# Patient Record
Sex: MALE | Race: WHITE | NOT HISPANIC OR LATINO | Employment: FULL TIME | URBAN - METROPOLITAN AREA
[De-identification: names, ages, dates, MRNs, and addresses within clinical notes are randomized per-mention and may not be internally consistent; named-entity substitution may affect disease eponyms.]

---

## 2021-01-16 ENCOUNTER — HOSPITAL ENCOUNTER (EMERGENCY)
Facility: HOSPITAL | Age: 26
Discharge: HOME | End: 2021-01-16
Attending: EMERGENCY MEDICINE
Payer: COMMERCIAL

## 2021-01-16 VITALS
TEMPERATURE: 97.7 F | DIASTOLIC BLOOD PRESSURE: 74 MMHG | SYSTOLIC BLOOD PRESSURE: 126 MMHG | OXYGEN SATURATION: 97 % | HEART RATE: 74 BPM | RESPIRATION RATE: 16 BRPM | WEIGHT: 194 LBS

## 2021-01-16 DIAGNOSIS — Z20.822 SUSPECTED COVID-19 VIRUS INFECTION: ICD-10-CM

## 2021-01-16 DIAGNOSIS — J06.9 URI (UPPER RESPIRATORY INFECTION): Primary | ICD-10-CM

## 2021-01-16 PROCEDURE — 99283 EMERGENCY DEPT VISIT LOW MDM: CPT

## 2021-01-16 PROCEDURE — U0003 INFECTIOUS AGENT DETECTION BY NUCLEIC ACID (DNA OR RNA); SEVERE ACUTE RESPIRATORY SYNDROME CORONAVIRUS 2 (SARS-COV-2) (CORONAVIRUS DISEASE [COVID-19]), AMPLIFIED PROBE TECHNIQUE, MAKING USE OF HIGH THROUGHPUT TECHNOLOGIES AS DESCRIBED BY CMS-2020-01-R: HCPCS | Performed by: PHYSICIAN ASSISTANT

## 2021-01-16 PROCEDURE — 99284 EMERGENCY DEPT VISIT MOD MDM: CPT | Performed by: PHYSICIAN ASSISTANT

## 2021-01-16 NOTE — Clinical Note
Sean Ragsdale was seen and treated in our emergency department on 1/16/2021  Diagnosis:     Kelsi Barahona    He may return on this date:     Patient tested for COVID-19  Pt will need to quarantine until results come back in approximately 3-5 days  If COVID positive, patient requires 14 day self-quarantine  If COVID negative and patient symptom free for 3 days, patient will be able to return to work  If you have any questions or concerns, please don't hesitate to call        Iza Lo PA-C    ______________________________           _______________          _______________  Hospital Representative                              Date                                Time

## 2021-01-16 NOTE — ED PROVIDER NOTES
HPI: Patient is a 26 y o  male who presents with 3 days of cough, headache and sore throat nasal congestion, post-nasal drip which the patient describes at mild The patient has had contact with people with similar symptoms  The patient has not taken any medication  Allergies   Allergen Reactions    Penicillins Hives       Past Medical History:   Diagnosis Date    Hypertension       History reviewed  No pertinent surgical history  Social History     Tobacco Use    Smoking status: Never Smoker    Smokeless tobacco: Never Used   Substance Use Topics    Alcohol use: Yes     Frequency: Monthly or less    Drug use: Never       Nursing notes reviewed  Physical Exam:  ED Triage Vitals [01/16/21 1836]   Temperature Pulse Respirations Blood Pressure SpO2   97 7 °F (36 5 °C) 74 16 126/74 97 %      Temp Source Heart Rate Source Patient Position - Orthostatic VS BP Location FiO2 (%)   Temporal Monitor Sitting Right arm --      Pain Score       5           ROS: Positive for cough, congestion, headache, post-nasal drip, headache, the remainder of a 10 organ system ROS was otherwise unremarkable  General: awake, alert, no acute distress    Head: normocephalic, atraumatic    Eyes: no scleral icterus  Ears: external ears normal, hearing grossly intact  Nose: external exam grossly normal, positive nasal discharge  Throat: no swelling, erythema or exudate  Uvula midline  Neck: symmetric, No JVD noted, trachea midline, no meningeal signs  Pulmonary: no respiratory distress, no tachypnea noted, lungs clear to auscultation bilaterally  Cardiovascular: appears well perfused, RRR  Abdomen: no distention noted  Musculoskeletal: no deformities noted, tone normal  Neuro: grossly non-focal  Psych: mood and affect appropriate    The patient is stable and has a history and physical exam consistent with a viral illness  COVID19 testing has been performed    I considered the patient's other medical conditions as applicable/noted above in my medical decision making  The patient is stable upon discharge  The plan is for supportive care at home  The patient (and any family present) verbalized understanding of the discharge instructions and warnings that would necessitate return to the Emergency Department  All questions were answered prior to discharge  Quick COVID-19 Severity Index (Predicts 24-hr risk of critical respiratory illness in patient's admitted from ED with COVID-19 )   Respiratory rate: <22 = 0  Pulse oximetry >92 = 0  O2 flow rate (L/min) <2 = 0  Result = 0 (low risk - 4% risk of critical illness at 24 hours)      Medications - No data to display  Final diagnoses:   Suspected COVID-19 virus infection   URI (upper respiratory infection)     Time reflects when diagnosis was documented in both MDM as applicable and the Disposition within this note     Time User Action Codes Description Comment    1/16/2021  7:23 PM Maria Tyrese Add [Z20 822] Suspected COVID-19 virus infection     1/16/2021  7:23 PM Maira Tyrese Add [J06 9] URI (upper respiratory infection)     1/16/2021  7:23 PM Maria Tyrese Modify [Z20 822] Suspected COVID-19 virus infection     1/16/2021  7:23 PM Maria Tyrese Modify [J06 9] URI (upper respiratory infection)       ED Disposition     ED Disposition Condition Date/Time Comment    Discharge Stable Sat Jan 16, 2021  7:23 PM Wilman Moore discharge to home/self care              Follow-up Information     Follow up With Specialties Details Why Contact Info Additional [address] [facility] Emergency Department Emergency Medicine Go to  If symptoms worsen [facility] [id number]  [address] [place] [address] Emergency Department, [address] [place] [address], [id number]        Patient's Medications   Discharge Prescriptions    DEXTROMETHORPHAN-GUAIFENESIN (MUCINEX DM)  MG PER 12 HR TABLET    Take 1 tablet by mouth every 12 (twelve) hours Start Date: 1/16/2021 End Date: --       Order Dose: 1 tablet       Quantity: 14 tablet    Refills: 0     No discharge procedures on file      Electronically Signed by       Joe Jovel PA-C  01/16/21 1924

## 2021-01-20 LAB — SARS-COV-2 RNA SPEC QL NAA+PROBE: NOT DETECTED

## 2021-01-20 NOTE — RESULT ENCOUNTER NOTE
Attempted calling Alisia Naqvi to provide testing results, no answer, left a message for him to return call or call the ER Dept he had testing completed at

## 2021-02-16 ENCOUNTER — TELEPHONE (OUTPATIENT)
Dept: FAMILY MEDICINE CLINIC | Facility: CLINIC | Age: 26
End: 2021-02-16

## 2021-02-16 ENCOUNTER — OFFICE VISIT (OUTPATIENT)
Dept: FAMILY MEDICINE CLINIC | Facility: CLINIC | Age: 26
End: 2021-02-16
Payer: COMMERCIAL

## 2021-02-16 VITALS
OXYGEN SATURATION: 98 % | HEART RATE: 89 BPM | WEIGHT: 196 LBS | HEIGHT: 68 IN | TEMPERATURE: 97.6 F | BODY MASS INDEX: 29.7 KG/M2 | RESPIRATION RATE: 16 BRPM | DIASTOLIC BLOOD PRESSURE: 84 MMHG | SYSTOLIC BLOOD PRESSURE: 130 MMHG

## 2021-02-16 DIAGNOSIS — J30.9 ALLERGIC RHINITIS, UNSPECIFIED SEASONALITY, UNSPECIFIED TRIGGER: Primary | ICD-10-CM

## 2021-02-16 DIAGNOSIS — K22.719 BARRETT'S ESOPHAGUS WITH DYSPLASIA: ICD-10-CM

## 2021-02-16 DIAGNOSIS — G89.29 CHRONIC LOW BACK PAIN, UNSPECIFIED BACK PAIN LATERALITY, UNSPECIFIED WHETHER SCIATICA PRESENT: ICD-10-CM

## 2021-02-16 DIAGNOSIS — K21.00 GASTROESOPHAGEAL REFLUX DISEASE WITH ESOPHAGITIS WITHOUT HEMORRHAGE: ICD-10-CM

## 2021-02-16 DIAGNOSIS — M54.50 CHRONIC LOW BACK PAIN, UNSPECIFIED BACK PAIN LATERALITY, UNSPECIFIED WHETHER SCIATICA PRESENT: ICD-10-CM

## 2021-02-16 DIAGNOSIS — F41.9 ANXIETY: ICD-10-CM

## 2021-02-16 DIAGNOSIS — R03.0 ELEVATED BLOOD PRESSURE READING IN OFFICE WITHOUT DIAGNOSIS OF HYPERTENSION: ICD-10-CM

## 2021-02-16 DIAGNOSIS — F41.9 ANXIETY: Primary | ICD-10-CM

## 2021-02-16 DIAGNOSIS — J30.9 ALLERGIC RHINITIS, UNSPECIFIED SEASONALITY, UNSPECIFIED TRIGGER: ICD-10-CM

## 2021-02-16 PROCEDURE — 3725F SCREEN DEPRESSION PERFORMED: CPT | Performed by: FAMILY MEDICINE

## 2021-02-16 PROCEDURE — 99203 OFFICE O/P NEW LOW 30 MIN: CPT | Performed by: FAMILY MEDICINE

## 2021-02-16 RX ORDER — HYDROXYZINE HYDROCHLORIDE 25 MG/1
25 TABLET, FILM COATED ORAL EVERY 6 HOURS PRN
COMMUNITY
End: 2021-02-16 | Stop reason: SDUPTHER

## 2021-02-16 RX ORDER — PANTOPRAZOLE SODIUM 40 MG/1
40 TABLET, DELAYED RELEASE ORAL DAILY
COMMUNITY
End: 2021-02-16 | Stop reason: SDUPTHER

## 2021-02-16 RX ORDER — PANTOPRAZOLE SODIUM 40 MG/1
TABLET, DELAYED RELEASE ORAL
Qty: 30 TABLET | Refills: 1 | Status: SHIPPED | OUTPATIENT
Start: 2021-02-16 | End: 2021-04-26

## 2021-02-16 RX ORDER — CETIRIZINE HYDROCHLORIDE 10 MG/1
10 TABLET ORAL DAILY
Start: 2021-02-16 | End: 2021-03-03

## 2021-02-16 RX ORDER — HYDROXYZINE HYDROCHLORIDE 25 MG/1
TABLET, FILM COATED ORAL
Qty: 30 TABLET | Refills: 0 | Status: SHIPPED | OUTPATIENT
Start: 2021-02-16 | End: 2021-03-23 | Stop reason: ALTCHOICE

## 2021-02-16 NOTE — PATIENT INSTRUCTIONS
Recommendation for transfer of records from prior PCP    Refer to gastroenterology    For blood pressure:   Advise limiting caffeine and salt intake  Exercise    Fasting labs  Then return visit in 6-8 weeks to review
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
## 2021-02-17 RX ORDER — ESCITALOPRAM OXALATE 10 MG/1
10 TABLET ORAL DAILY
Qty: 90 TABLET | Refills: 0
Start: 2021-02-17 | End: 2021-03-23 | Stop reason: ALTCHOICE

## 2021-02-17 RX ORDER — MONTELUKAST SODIUM 10 MG/1
10 TABLET ORAL
Qty: 90 TABLET | Refills: 0
Start: 2021-02-17

## 2021-02-17 NOTE — TELEPHONE ENCOUNTER
Please call pt in followup to visit yesterday -   Interestingly - pt already is taking lexapro; That was one of the medications that we discussed at visit  If pt is taking 10 mg lexapro daily - then I would suggest increasing to 20 mg daily  And try to decrease atarax dosing to once daily - with ultimate goal of taking atarax prn only  Since he has been taking atarax for 1-2 years multiple times/day - would at least try to decrease to qd dosing for now  And then recommend keeping scheduled followup appt in 3/2021  I can send in refill on lexapro for 20 mg tabs; He can take 2 of the 10 mg tabs for now  Thanks!

## 2021-02-26 ENCOUNTER — TELEPHONE (OUTPATIENT)
Dept: OTHER | Facility: OTHER | Age: 26
End: 2021-02-26

## 2021-02-26 NOTE — TELEPHONE ENCOUNTER
I spoke to him earlier this afternoon, we are moving office today -   unable to see him  Has had 4-5 days upper congestion -   Thinks he has a 'sinus infection '  Has some stomach upset, runny nose, headache and sore throat along w the congestion - No fever, no cough         I advised doing covid test w/ widespread outbreak in area  -   He does not feel this is covid,   I advised eval at [facility] where they can better assess what is needed

## 2021-03-03 ENCOUNTER — OFFICE VISIT (OUTPATIENT)
Dept: FAMILY MEDICINE CLINIC | Facility: CLINIC | Age: 26
End: 2021-03-03
Payer: COMMERCIAL

## 2021-03-03 VITALS
OXYGEN SATURATION: 98 % | DIASTOLIC BLOOD PRESSURE: 82 MMHG | HEIGHT: 68 IN | BODY MASS INDEX: 29.55 KG/M2 | WEIGHT: 195 LBS | SYSTOLIC BLOOD PRESSURE: 130 MMHG | RESPIRATION RATE: 20 BRPM | HEART RATE: 75 BPM

## 2021-03-03 DIAGNOSIS — B34.9 VIRAL INFECTION, UNSPECIFIED: Primary | ICD-10-CM

## 2021-03-03 DIAGNOSIS — J06.9 UPPER RESPIRATORY TRACT INFECTION, UNSPECIFIED TYPE: Primary | ICD-10-CM

## 2021-03-03 DIAGNOSIS — B34.9 VIRAL INFECTION, UNSPECIFIED: ICD-10-CM

## 2021-03-03 PROCEDURE — U0003 INFECTIOUS AGENT DETECTION BY NUCLEIC ACID (DNA OR RNA); SEVERE ACUTE RESPIRATORY SYNDROME CORONAVIRUS 2 (SARS-COV-2) (CORONAVIRUS DISEASE [COVID-19]), AMPLIFIED PROBE TECHNIQUE, MAKING USE OF HIGH THROUGHPUT TECHNOLOGIES AS DESCRIBED BY CMS-2020-01-R: HCPCS | Performed by: FAMILY MEDICINE

## 2021-03-03 PROCEDURE — U0005 INFEC AGEN DETEC AMPLI PROBE: HCPCS | Performed by: FAMILY MEDICINE

## 2021-03-03 PROCEDURE — 99214 OFFICE O/P EST MOD 30 MIN: CPT | Performed by: FAMILY MEDICINE

## 2021-03-03 RX ORDER — DOXYCYCLINE HYCLATE 100 MG/1
CAPSULE ORAL
Qty: 11 CAPSULE | Refills: 0 | Status: SHIPPED | OUTPATIENT
Start: 2021-03-03 | End: 2021-03-13

## 2021-03-03 NOTE — PROGRESS NOTES
FAMILY PRACTICE OFFICE VISIT    NAME: Wilman Moore    AGE: 23 y o  SEX: male  : 1995   MRN: [id number]    DATE: 3/3/2021  TIME: 3:01 PM    Assessment and Plan   1  Upper respiratory tract infection, unspecified type  Suspect viral - will send for covid testing today  Patient to call for results if he/she does not hear from us    If (-) however, and symptoms continue - over next 1-2 days while awaiting covid test results - fill rx for antibiotic  Discussed that z-max would not be a great option as it could cause QTc prolongation with SSRI  Pt is allergic to PCN  Also suggest tylenol prn  Saline nasal spray prn  And plenty of fluids  Suggested flonase - pt states he does not think that this works      AVS:  Advise against taking any anti-histamines as they could adversely interact with hydroxyzine  Call dr Albert Salgado if you decide you want to increase lexapro from 10 mg to 20mg daily; pt never responded to prior phone call regarding this  Pt to return for another visit to discuss  AVS:    Fill rx for antiboitic if symptoms persistent and covid testing negative    Go for covid testing today    Quarantine until results come back  (along with household contacts)    Discussed quarantine vs isolation  For pt and his household contacts depending on results of covid testing  For now - pt and girlfriend in quarantine until results known  And then he can touch base with us again if needed re: ongoing isolation precautions  Continue with handwashing, masking, and washing of frequently touched surfaces          Chief Complaint     Chief Complaint   Patient presents with    Sinusitis     x1 month, post nasal drip, with nausea, headache with pressure in the sinus area, claer mucus        History of Present Illness   Wilman Moore is a 23y o -year-old male who presents today for concerns over sinus pressure  He has had symptoms that started toward end of milly    He tested (-) for covid 2021  Pt has taken mucinex, claritin without great relief          Review of Systems   Review of Systems   Constitutional: Negative for chills and fever  Denies body aches  HENT: Positive for congestion, sinus pressure and sinus pain  No loss of taste or smell  Respiratory: Negative for cough, shortness of breath and wheezing  Cardiovascular: Negative for chest pain and palpitations  Gastrointestinal: Positive for nausea  Negative for diarrhea and vomiting  Still drinking fluids and eating ok   Allergic/Immunologic: Positive for environmental allergies  Neurological: Positive for headaches  Sinus headaches  Active Problem List     Patient Active Problem List   Diagnosis    Gastroesophageal reflux disease with esophagitis without hemorrhage    Oden's esophagus with dysplasia    Elevated blood pressure reading in office without diagnosis of hypertension    Anxiety    Allergic rhinitis    Chronic low back pain         Past Medical History:  Past Medical History:   Diagnosis Date    Anxiety     Hypertension        Past Surgical History:  No past surgical history on file      Family History:  Family History   Problem Relation Age of Onset    Diabetes Mother     Hypertension Mother     Anxiety disorder Mother     Heart attack Father     Hypertension Father     Anxiety disorder Father        Social History:  Social History     Socioeconomic History    Marital status: Unknown     Spouse name: Not on file    Number of children: Not on file    Years of education: Not on file    Highest education level: Not on file   Occupational History    Not on file   Social Needs    Financial resource strain: Not on file    Food insecurity     Worry: Not on file     Inability: Not on file    Transportation needs     Medical: Not on file     Non-medical: Not on file   Tobacco Use    Smoking status: Never Smoker    Smokeless tobacco: Never Used   Substance and Sexual Activity    Alcohol use: Yes     Frequency: Monthly or less     Comment: holiday/special occassion    Drug use: Never    Sexual activity: Yes     Partners: Female   Lifestyle    Physical activity     Days per week: Not on file     Minutes per session: Not on file    Stress: Not on file   Relationships    Social connections     Talks on phone: Not on file     Gets together: Not on file     Attends [employer] service: Not on file     Active member of club or organization: Not on file     Attends meetings of clubs or organizations: Not on file     Relationship status: Not on file    Intimate partner violence     Fear of current or ex partner: Not on file     Emotionally abused: Not on file     Physically abused: Not on file     Forced sexual activity: Not on file   Other Topics Concern    Not on file   Social History Narrative    Not on file       Objective     Vitals:    03/03/21 1451   BP: 130/82   Pulse: 75   Resp: 20   SpO2: 98%     Wt Readings from Last 3 Encounters:   03/03/21 88 5 kg (195 lb)   02/16/21 88 9 kg (196 lb)   01/16/21 88 kg (194 lb 0 1 oz)       Physical Exam  Vitals signs and nursing note reviewed  Constitutional:       General: He is not in acute distress  Appearance: Normal appearance  He is not ill-appearing, toxic-appearing or diaphoretic  Comments: Good facial coloring  Nontoxic     HENT:      Right Ear: Tympanic membrane and ear canal normal  There is no impacted cerumen  Left Ear: Tympanic membrane and ear canal normal  There is no impacted cerumen  Nose: Congestion present  No rhinorrhea  Comments: Mild nasal congestion  No post-nasal drip  Oropharynx clear  Mucous membranes moist and pink         Mouth/Throat:      Mouth: Mucous membranes are moist       Pharynx: Oropharynx is clear  No oropharyngeal exudate or posterior oropharyngeal erythema  Eyes:      General: No scleral icterus  Right eye: No discharge  Left eye: No discharge     Cardiovascular: Rate and Rhythm: Normal rate and regular rhythm  Pulses: Normal pulses  Heart sounds: Normal heart sounds  No murmur  Pulmonary:      Effort: Pulmonary effort is normal  No respiratory distress  Breath sounds: Normal breath sounds  No stridor  No wheezing, rhonchi or rales  Lymphadenopathy:      Cervical: No cervical adenopathy  Skin:     General: Skin is warm  Neurological:      General: No focal deficit present  Mental Status: He is alert and oriented to person, place, and time  Psychiatric:      Comments: Pt's affect somewhat flat  Pt seemed a little annoyed about having to go for covid testing  I kindly explained that it is in pt's best interest to protect  himself and others if dx with covid-19 infection  Pertinent Laboratory/Diagnostic Studies:  No results found for: GLUCOSE, BUN, CREATININE, CALCIUM, NA, K, CO2, CL  No results found for: ALT, AST, GGT, ALKPHOS, BILITOT    No results found for: WBC, HGB, HCT, MCV, PLT    No results found for: TSH    No results found for: CHOL  No results found for: TRIG  No results found for: HDL  No results found for: LDLCALC  No results found for: HGBA1C    Results for orders placed or performed during the hospital encounter of 01/16/21   Novel Coronavirus (COVID-19), PCR [facility]    Specimen: Nasopharyngeal Swab   Result Value Ref Range    SARS-CoV-2  Not Detected Not Detected       No orders of the defined types were placed in this encounter  ALLERGIES:  Allergies   Allergen Reactions    Penicillins Hives       Current Medications     Current Outpatient Medications   Medication Sig Dispense Refill    escitalopram (LEXAPRO) 10 mg tablet Take 1 tablet (10 mg total) by mouth daily 90 tablet 0    hydrOXYzine HCL (ATARAX) 25 mg tablet Take 1 tablet by mouth three times a day as needed for anxiety   Avoid driving/working while taking due to sedation; do not take with zyrtec due to sedation 30 tablet 0    montelukast (SINGULAIR) 10 mg tablet Take 1 tablet (10 mg total) by mouth daily at bedtime 90 tablet 0    pantoprazole (PROTONIX) 40 mg tablet Take 1 tablet by mouth once daily in the am - 30 min prior to eating 30 tablet 1    cetirizine (ZyrTEC) 10 mg tablet Take 1 tablet (10 mg total) by mouth daily prn (Patient not taking: Reported on 3/3/2021)      dextromethorphan-guaifenesin (MUCINEX DM)  MG per 12 hr tablet Take 1 tablet by mouth every 12 (twelve) hours (Patient not taking: Reported on 2/16/2021) 14 tablet 0     No current facility-administered medications for this visit  Health Maintenance     Health Maintenance   Topic Date Due    HPV Vaccine (1 - Male 2-dose series) 02/28/2007    HIV Screening  02/28/2011    Annual Physical  02/28/2014    DTaP,Tdap,and Td Vaccines (1 - Tdap) 02/28/2017    Influenza Vaccine (1) 06/30/2021 (Originally 9/1/2020)    Depression Screening PHQ  02/16/2022    BMI: Followup Plan  02/16/2022    BMI: Adult  03/03/2022    Pneumococcal Vaccine: Pediatrics (0 to 5 Years) and At-Risk Patients (6 to 59 Years)  Aged Out    HIB Vaccine  Aged Out    Hepatitis B Vaccine  Aged Out    IPV Vaccine  Aged Out    Hepatitis A Vaccine  Aged Out    Meningococcal ACWY Vaccine  Aged Out       There is no immunization history on file for this patient         Irina Mims DO

## 2021-03-03 NOTE — PATIENT INSTRUCTIONS
Advise against taking any anti-histamines as they could adversely interact with hydroxyzine      Call dr Katya Guzman if you decide you want to increase lexapro from 10 mg to 20mg daily    Fill rx for antiboitic if symptoms persistent and covid testing negative    Go for covid testing today    Quarantine until results come back  (along with household contacts)

## 2021-03-04 LAB — SARS-COV-2 RNA RESP QL NAA+PROBE: NEGATIVE

## 2021-03-04 NOTE — RESULT ENCOUNTER NOTE
Please inform pt of NEGATIVE covid test results  If still symptomatic - pt can fill rx for antibiotic

## 2021-03-11 NOTE — TELEPHONE ENCOUNTER
Please let pt know that I would like him to be seen by psychiatry to discuss other possible medication treatment options and possibly counseling  Referral placed

## 2021-03-11 NOTE — TELEPHONE ENCOUNTER
Spoke to pt in regards to the message below  Pt states the lexapro is not working for him at all  I asked him to do the 20mg and he states even with just 10mg its not working for him   Pt is scheduled for 3- at 3:30

## 2021-03-22 ENCOUNTER — LAB (OUTPATIENT)
Dept: LAB | Facility: MEDICAL CENTER | Age: 26
End: 2021-03-22
Payer: COMMERCIAL

## 2021-03-22 DIAGNOSIS — J30.9 ALLERGIC RHINITIS, UNSPECIFIED SEASONALITY, UNSPECIFIED TRIGGER: ICD-10-CM

## 2021-03-22 PROCEDURE — 86003 ALLG SPEC IGE CRUDE XTRC EA: CPT

## 2021-03-22 PROCEDURE — 36415 COLL VENOUS BLD VENIPUNCTURE: CPT

## 2021-03-22 PROCEDURE — 82785 ASSAY OF IGE: CPT

## 2021-03-23 ENCOUNTER — OFFICE VISIT (OUTPATIENT)
Dept: FAMILY MEDICINE CLINIC | Facility: CLINIC | Age: 26
End: 2021-03-23
Payer: COMMERCIAL

## 2021-03-23 ENCOUNTER — APPOINTMENT (OUTPATIENT)
Dept: RADIOLOGY | Facility: MEDICAL CENTER | Age: 26
End: 2021-03-23
Payer: COMMERCIAL

## 2021-03-23 VITALS
WEIGHT: 199.6 LBS | HEART RATE: 70 BPM | SYSTOLIC BLOOD PRESSURE: 138 MMHG | HEIGHT: 68 IN | RESPIRATION RATE: 18 BRPM | TEMPERATURE: 97.4 F | DIASTOLIC BLOOD PRESSURE: 90 MMHG | OXYGEN SATURATION: 98 % | BODY MASS INDEX: 30.25 KG/M2

## 2021-03-23 DIAGNOSIS — R05.3 CHRONIC COUGH: Primary | ICD-10-CM

## 2021-03-23 DIAGNOSIS — F41.9 ANXIETY: ICD-10-CM

## 2021-03-23 DIAGNOSIS — R05.3 CHRONIC COUGH: ICD-10-CM

## 2021-03-23 LAB
A ALTERNATA IGE QN: <0.1 KUA/I
A FUMIGATUS IGE QN: <0.1 KUA/I
ALLERGEN COMMENT: NORMAL
BERMUDA GRASS IGE QN: <0.1 KUA/I
BOXELDER IGE QN: <0.1 KUA/I
C HERBARUM IGE QN: <0.1 KUA/I
CAT DANDER IGE QN: <0.1 KUA/I
CMN PIGWEED IGE QN: <0.1 KUA/I
COMMON RAGWEED IGE QN: <0.1 KUA/I
COTTONWOOD IGE QN: <0.1 KUA/I
D FARINAE IGE QN: <0.1 KUA/I
D PTERONYSS IGE QN: <0.1 KUA/I
DOG DANDER IGE QN: <0.1 KUA/I
LONDON PLANE IGE QN: <0.1 KUA/I
MOUSE URINE PROT IGE QN: <0.1 KUA/I
MT JUNIPER IGE QN: <0.1 KUA/I
MUGWORT IGE QN: <0.1 KUA/I
P NOTATUM IGE QN: <0.1 KUA/I
ROACH IGE QN: <0.1 KUA/I
SHEEP SORREL IGE QN: <0.1 KUA/I
SILVER BIRCH IGE QN: <0.1 KUA/I
TIMOTHY IGE QN: <0.1 KUA/I
TOTAL IGE SMQN RAST: <2 KU/L (ref 0–113)
WALNUT IGE QN: <0.1 KUA/I
WHITE ASH IGE QN: <0.1 KUA/I
WHITE ELM IGE QN: <0.1 KUA/I
WHITE MULBERRY IGE QN: <0.1 KUA/I
WHITE OAK IGE QN: <0.1 KUA/I

## 2021-03-23 PROCEDURE — 71046 X-RAY EXAM CHEST 2 VIEWS: CPT

## 2021-03-23 PROCEDURE — 99213 OFFICE O/P EST LOW 20 MIN: CPT | Performed by: INTERNAL MEDICINE

## 2021-03-23 PROCEDURE — 3008F BODY MASS INDEX DOCD: CPT | Performed by: INTERNAL MEDICINE

## 2021-03-23 PROCEDURE — 1036F TOBACCO NON-USER: CPT | Performed by: INTERNAL MEDICINE

## 2021-03-23 RX ORDER — AZITHROMYCIN 250 MG/1
TABLET, FILM COATED ORAL
Qty: 6 TABLET | Refills: 0 | Status: SHIPPED | OUTPATIENT
Start: 2021-03-23 | End: 2021-03-28

## 2021-03-23 RX ORDER — LORATADINE 10 MG/1
10 TABLET ORAL DAILY
Qty: 30 TABLET | Refills: 0 | Status: SHIPPED | OUTPATIENT
Start: 2021-03-23 | End: 2021-03-23 | Stop reason: CLARIF

## 2021-03-23 RX ORDER — DULOXETIN HYDROCHLORIDE 30 MG/1
30 CAPSULE, DELAYED RELEASE ORAL DAILY
Qty: 30 CAPSULE | Refills: 0 | Status: SHIPPED | OUTPATIENT
Start: 2021-03-23 | End: 2021-04-01

## 2021-03-23 RX ORDER — FLUTICASONE PROPIONATE 50 MCG
2 SPRAY, SUSPENSION (ML) NASAL DAILY
Qty: 1 BOTTLE | Refills: 2 | Status: SHIPPED | OUTPATIENT
Start: 2021-03-23 | End: 2021-09-23 | Stop reason: ALTCHOICE

## 2021-03-23 NOTE — PATIENT INSTRUCTIONS
Take Claritin 1 pill daily in the morning  Use Flonase daily at night despite of you symptoms 2  Sprays each nostril  Let us use Z-Freddie  Please call me in 7-10 days let me know how you feel  please do x-ray  Take duloxetine 1 pill once a day  Please let me know in 3 weeks how you do    If any worsening of anxiety please stop medication and call me

## 2021-03-23 NOTE — PROGRESS NOTES
Assessment/Plan:    No problem-specific Assessment & Plan notes found for this encounter  Diagnoses and all orders for this visit:    Chronic cough  Comments:  Likely due to postnasal drip, can be allergy related  Will try Flonase, discontinue hydroxyzine, start Claritin  Will also do trial of ZPak  Orders:  -     fluticasone (FLONASE) 50 mcg/act nasal spray; 2 sprays into each nostril daily  -     azithromycin (Zithromax) 250 mg tablet; Take 2 tablets (500 mg total) by mouth daily for 1 day, THEN 1 tablet (250 mg total) daily for 4 days  -     Discontinue: loratadine (CLARITIN) 10 mg tablet; Take 1 tablet (10 mg total) by mouth daily  -     XR chest pa & lateral; Future    Anxiety  Comments: Will discontinue escitalopram   Start duloxetine 30 mg daily  Patient was instructed to call me in 3 weeks with the update of how he feels  Orders:  -     DULoxetine (CYMBALTA) 30 mg delayed release capsule; Take 1 capsule (30 mg total) by mouth daily          Likely due to postnasal drip, can be allergy related  Will try Flonase, discontinue hydroxyzine, start Claritin  Will also do trial of ZPak  Will also do x-ray of the chest   He will call me in 7 days with report on his symptoms  Subjective:      Patient ID: Mauricio Santos is a 23 y o  male  Patient came today with complaints of a chronic cough, congestion, white nasal discharge that he has already for few months  He had problems with pain in his sinuses in the past and he was treated with hydroxyzine and azithromycin with good response previously  He said that he was trying to use Flonase in the past without any response  He said that he had trauma to his nose and he had surgical intervention on his nasal septum in the past   He denies any heartburn, wheezing, chest tightness  He also reports pain in his frontal sinus  Just recently patient had cough it 19 test which was negative  He also had allergy testing which also was negative    He also reports that he was on Lexapro for anxiety without any relief of his symptoms  It was tried in the past to increase Lexapro from 10 mg to 20 mg but patient developed nausea and he could not tolerated  He reports that his anxiety is somewhat controlled right now but he would like to try something else  The following portions of the patient's history were reviewed and updated as appropriate: allergies, current medications, past family history, past medical history, past social history, past surgical history, and problem list     Review of Systems   Constitutional: Negative for appetite change, chills, fatigue and fever  HENT: Positive for congestion and sinus pain  Negative for rhinorrhea and sore throat  Respiratory: Positive for cough  Negative for chest tightness and shortness of breath  Cardiovascular: Negative for chest pain  Gastrointestinal: Negative for diarrhea, nausea and vomiting  Musculoskeletal: Negative for arthralgias and myalgias  Objective:      /90 (BP Location: Left arm, Cuff Size: Adult)   Pulse 70   Temp (!) 97 4 °F (36 3 °C) (Temporal)   Resp 18   Ht 5' 8" (1 727 m)   Wt 90 5 kg (199 lb 9 6 oz)   SpO2 98%   BMI 30 35 kg/m²          Physical Exam  Vitals signs reviewed  Constitutional:       General: He is not in acute distress  Appearance: He is well-developed  He is not diaphoretic  HENT:      Head: Normocephalic  Nose:      Comments: Erythema of the nasal passages     Mouth/Throat:      Mouth: Mucous membranes are moist       Comments: Signs of postnasal drip  Eyes:      Pupils: Pupils are equal, round, and reactive to light  Neck:      Thyroid: No thyromegaly  Trachea: No tracheal deviation  Cardiovascular:      Rate and Rhythm: Normal rate and regular rhythm  Heart sounds: Normal heart sounds  No murmur  Pulmonary:      Effort: Pulmonary effort is normal  No respiratory distress  Breath sounds:  No wheezing or rales    Lymphadenopathy:      Cervical: No cervical adenopathy  Skin:     Findings: No erythema  Neurological:      Mental Status: He is alert  Cranial Nerves: No cranial nerve deficit  Psychiatric:         Thought Content: Thought content normal          Judgment: Judgment normal                Current Outpatient Medications:     montelukast (SINGULAIR) 10 mg tablet, Take 1 tablet (10 mg total) by mouth daily at bedtime, Disp: 90 tablet, Rfl: 0    pantoprazole (PROTONIX) 40 mg tablet, Take 1 tablet by mouth once daily in the am - 30 min prior to eating, Disp: 30 tablet, Rfl: 1    azithromycin (Zithromax) 250 mg tablet, Take 2 tablets (500 mg total) by mouth daily for 1 day, THEN 1 tablet (250 mg total) daily for 4 days  , Disp: 6 tablet, Rfl: 0    DULoxetine (CYMBALTA) 30 mg delayed release capsule, Take 1 capsule (30 mg total) by mouth daily, Disp: 30 capsule, Rfl: 0    fluticasone (FLONASE) 50 mcg/act nasal spray, 2 sprays into each nostril daily, Disp: 1 Bottle, Rfl: 2

## 2021-03-25 NOTE — TELEPHONE ENCOUNTER
I called patient and stated to him Dr Morales Porter would like for him to follow  up with Psychiatry and patient stated he switched Doctors  I stated what do you mean he stated back I switched doctors in your office  I then stated to patient are you still going to go to see psychiatry and he said no  Thank you!

## 2021-04-01 ENCOUNTER — TELEMEDICINE (OUTPATIENT)
Dept: FAMILY MEDICINE CLINIC | Facility: CLINIC | Age: 26
End: 2021-04-01
Payer: COMMERCIAL

## 2021-04-01 DIAGNOSIS — F41.9 ANXIETY: ICD-10-CM

## 2021-04-01 DIAGNOSIS — J30.9 ALLERGIC RHINITIS, UNSPECIFIED SEASONALITY, UNSPECIFIED TRIGGER: Primary | ICD-10-CM

## 2021-04-01 PROCEDURE — 99214 OFFICE O/P EST MOD 30 MIN: CPT | Performed by: INTERNAL MEDICINE

## 2021-04-01 RX ORDER — AZELASTINE 1 MG/ML
1 SPRAY, METERED NASAL 2 TIMES DAILY
Qty: 1 BOTTLE | Refills: 2 | Status: SHIPPED | OUTPATIENT
Start: 2021-04-01 | End: 2021-09-23 | Stop reason: ALTCHOICE

## 2021-04-01 RX ORDER — ESCITALOPRAM OXALATE 20 MG/1
20 TABLET ORAL DAILY
Qty: 30 TABLET | Refills: 0 | Status: SHIPPED | OUTPATIENT
Start: 2021-04-01 | End: 2021-04-15

## 2021-04-01 RX ORDER — ESCITALOPRAM OXALATE 10 MG/1
10 TABLET ORAL DAILY
Qty: 7 TABLET | Refills: 0 | Status: SHIPPED | OUTPATIENT
Start: 2021-04-01 | End: 2021-04-15

## 2021-04-01 NOTE — PROGRESS NOTES
Virtual Brief Visit    Assessment/Plan:    Problem List Items Addressed This Visit        Respiratory    Allergic rhinitis - Primary    Relevant Medications    azelastine (ASTELIN) 0 1 % nasal spray       Other    Anxiety    Relevant Medications    escitalopram (LEXAPRO) 10 mg tablet    escitalopram (LEXAPRO) 20 mg tablet          Will stop duloxetine  Will put patient back on escitalopram 10 mg daily, will increase dose up to 20 mg in 1 week  Prescriptions were sent  Continue Flonase, cetirizine for allergic rhinitis  Will also add as a last seen sprays  Patient was instructed to call me in few weeks with his progress  He was instructed to notify me immediately if he will feel that his headaches, nausea and abdominal discomfort will not get better after discontinuation of duloxetine  Reason for visit is   Chief Complaint   Patient presents with    Virtual Brief Visit        Encounter provider Kayy Mccallum MD    Provider located at [address] [address] [phone]    Recent Visits  No visits were found meeting these conditions  Showing recent visits within past 7 days and meeting all other requirements     Today's Visits  Date Type Provider Dept   04/01/21 Stefani Mckeon MD [place] [address] [facility]   Showing today's visits and meeting all other requirements     Future Appointments  No visits were found meeting these conditions  Showing future appointments within next 150 days and meeting all other requirements        After connecting through telephone, the patient was identified by name and date of birth  Heron Mathew was informed that this is a telemedicine visit and that the visit is being conducted through telephone  My office door was closed  No one else was in the room  He acknowledged consent and understanding of privacy and security of the platform   The patient has agreed to participate and understands he can discontinue the visit at any time  Patient is aware this is a billable service  Subjective    Lizbeth Dean is a 23 y o  male  Patient called today with complaints of headache, nausea and abdominal pain that started after we switched him to duloxetine 30 mg daily  Most likely he developed side effects of the medication  Patient was instructed to stop it right now  He denies any chills or fevers, no recent sick contacts  He also reported that he has postnasal drip and allergic symptoms got better after we started him on Flonase and cetirizine  Past Medical History:   Diagnosis Date    Anxiety     Hypertension        No past surgical history on file  Current Outpatient Medications   Medication Sig Dispense Refill    azelastine (ASTELIN) 0 1 % nasal spray 1 spray into each nostril 2 (two) times a day Use in each nostril as directed 1 Bottle 2    escitalopram (LEXAPRO) 10 mg tablet Take 1 tablet (10 mg total) by mouth daily 7 tablet 0    escitalopram (LEXAPRO) 20 mg tablet Take 1 tablet (20 mg total) by mouth daily 30 tablet 0    fluticasone (FLONASE) 50 mcg/act nasal spray 2 sprays into each nostril daily 1 Bottle 2    montelukast (SINGULAIR) 10 mg tablet Take 1 tablet (10 mg total) by mouth daily at bedtime 90 tablet 0    pantoprazole (PROTONIX) 40 mg tablet Take 1 tablet by mouth once daily in the am - 30 min prior to eating 30 tablet 1     No current facility-administered medications for this visit  Allergies   Allergen Reactions    Penicillins Hives       Review of Systems   Constitutional: Negative for chills and fever  Respiratory: Negative for shortness of breath  Gastrointestinal: Positive for abdominal pain and nausea  Negative for blood in stool, diarrhea and vomiting  Neurological: Positive for headaches  There were no vitals filed for this visit        I spent 10 minutes directly with the patient during this visit    VIRTUAL VISIT DISCLAIMER    Xiomara Herrera acknowledges that he has consented to an online visit or consultation  He understands that the online visit is based solely on information provided by him, and that, in the absence of a face-to-face physical evaluation by the physician, the diagnosis he receives is both limited and provisional in terms of accuracy and completeness  This is not intended to replace a full medical face-to-face evaluation by the physician  Xiomara Herrera understands and accepts these terms

## 2021-04-15 ENCOUNTER — TELEMEDICINE (OUTPATIENT)
Dept: FAMILY MEDICINE CLINIC | Facility: CLINIC | Age: 26
End: 2021-04-15
Payer: COMMERCIAL

## 2021-04-15 DIAGNOSIS — F41.9 ANXIETY: Primary | ICD-10-CM

## 2021-04-15 PROCEDURE — 1036F TOBACCO NON-USER: CPT | Performed by: INTERNAL MEDICINE

## 2021-04-15 PROCEDURE — 99214 OFFICE O/P EST MOD 30 MIN: CPT | Performed by: INTERNAL MEDICINE

## 2021-04-15 RX ORDER — BUSPIRONE HYDROCHLORIDE 7.5 MG/1
7.5 TABLET ORAL 2 TIMES DAILY
Qty: 20 TABLET | Refills: 0 | Status: SHIPPED | OUTPATIENT
Start: 2021-04-15 | End: 2021-04-29

## 2021-04-17 NOTE — PROGRESS NOTES
Virtual Brief Visit    Assessment/Plan:    Problem List Items Addressed This Visit        Other    Anxiety - Primary    Relevant Medications    busPIRone (BUSPAR) 7 5 mg tablet          Start BuSpar 7 5 mg twice a day  Patient was instructed to call me in 7 days and let me know how he feels  Will consider to increase the dose if it will be necessary  Reason for visit is No chief complaint on file  Encounter provider Dc Paz MD    Provider located at [address] [address] [phone]    Recent Visits  Date Type Provider Dept   04/15/21 MD Danielle Evans [facility]   Showing recent visits within past 7 days and meeting all other requirements     Future Appointments  No visits were found meeting these conditions  Showing future appointments within next 150 days and meeting all other requirements        After connecting through telephone, the patient was identified by name and date of birth  Patricia Mosquera was informed that this is a telemedicine visit and that the visit is being conducted through telephone  My office door was closed  No one else was in the room  He acknowledged consent and understanding of privacy and security of the platform  The patient has agreed to participate and understands he can discontinue the visit at any time  Patient is aware this is a billable service  Subjective    Patricia Mosquera is a 23 y o  male  Patient called today with complaints of nausea that he started to develop after we started him on as citalopram for his anxiety  He reported he cannot tolerate this medication well  He reported that in the past he had a good response with no side effects to buspirone  Past Medical History:   Diagnosis Date    Anxiety     Hypertension        No past surgical history on file      Current Outpatient Medications   Medication Sig Dispense Refill    azelastine (ASTELIN) 0 1 % nasal spray 1 spray into each nostril 2 (two) times a day Use in each nostril as directed 1 Bottle 2    busPIRone (BUSPAR) 7 5 mg tablet Take 1 tablet (7 5 mg total) by mouth 2 (two) times a day 20 tablet 0    fluticasone (FLONASE) 50 mcg/act nasal spray 2 sprays into each nostril daily 1 Bottle 2    montelukast (SINGULAIR) 10 mg tablet Take 1 tablet (10 mg total) by mouth daily at bedtime 90 tablet 0    pantoprazole (PROTONIX) 40 mg tablet Take 1 tablet by mouth once daily in the am - 30 min prior to eating 30 tablet 1     No current facility-administered medications for this visit  Allergies   Allergen Reactions    Penicillins Hives       Review of Systems   Gastrointestinal: Positive for nausea  Psychiatric/Behavioral: Negative for confusion, dysphoric mood, hallucinations and self-injury  The patient is nervous/anxious  There were no vitals filed for this visit  I spent 10 minutes directly with the patient during this visit    VIRTUAL VISIT DISCLAIMER    Joanne Luque acknowledges that he has consented to an online visit or consultation  He understands that the online visit is based solely on information provided by him, and that, in the absence of a face-to-face physical evaluation by the physician, the diagnosis he receives is both limited and provisional in terms of accuracy and completeness  This is not intended to replace a full medical face-to-face evaluation by the physician  Joanne Luque understands and accepts these terms

## 2021-04-22 ENCOUNTER — TELEPHONE (OUTPATIENT)
Dept: PSYCHIATRY | Facility: CLINIC | Age: 26
End: 2021-04-22

## 2021-04-24 DIAGNOSIS — K22.719 BARRETT'S ESOPHAGUS WITH DYSPLASIA: ICD-10-CM

## 2021-04-24 DIAGNOSIS — K21.00 GASTROESOPHAGEAL REFLUX DISEASE WITH ESOPHAGITIS WITHOUT HEMORRHAGE: ICD-10-CM

## 2021-04-26 RX ORDER — PANTOPRAZOLE SODIUM 40 MG/1
TABLET, DELAYED RELEASE ORAL
Qty: 30 TABLET | Refills: 1 | Status: SHIPPED | OUTPATIENT
Start: 2021-04-26 | End: 2021-05-05 | Stop reason: SDUPTHER

## 2021-04-29 ENCOUNTER — HOSPITAL ENCOUNTER (EMERGENCY)
Facility: HOSPITAL | Age: 26
Discharge: HOME | End: 2021-04-30
Attending: EMERGENCY MEDICINE | Admitting: EMERGENCY MEDICINE
Payer: COMMERCIAL
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
## 2021-04-29 DIAGNOSIS — S16.1XXA STRAIN OF NECK MUSCLE, INITIAL ENCOUNTER: ICD-10-CM

## 2021-04-29 DIAGNOSIS — M54.9 BACK PAIN: ICD-10-CM

## 2021-04-29 DIAGNOSIS — F41.9 ANXIETY: ICD-10-CM

## 2021-04-29 DIAGNOSIS — F41.9 ANXIETY: Primary | ICD-10-CM

## 2021-04-29 PROCEDURE — 99283 EMERGENCY DEPT VISIT LOW MDM: CPT

## 2021-04-29 PROCEDURE — 99284 EMERGENCY DEPT VISIT MOD MDM: CPT | Performed by: EMERGENCY MEDICINE

## 2021-04-29 RX ORDER — BUSPIRONE HYDROCHLORIDE 15 MG/1
15 TABLET ORAL 2 TIMES DAILY
Qty: 60 TABLET | Refills: 0 | Status: SHIPPED | OUTPATIENT
Start: 2021-04-29 | End: 2021-04-30 | Stop reason: SDUPTHER

## 2021-04-29 RX ORDER — BUSPIRONE HYDROCHLORIDE 7.5 MG/1
7.5 TABLET ORAL 2 TIMES DAILY
Qty: 20 TABLET | Refills: 0 | OUTPATIENT
Start: 2021-04-29

## 2021-04-29 NOTE — Clinical Note
David Panchal was seen and treated in our emergency department on 4/29/2021  Diagnosis:     Court Keita  may return to school on return date  He may return on this date: 05/01/2021         If you have any questions or concerns, please don't hesitate to call        Stephanie Magaña RN    ______________________________           _______________          _______________  Hospital Representative                              Date                                Time

## 2021-04-29 NOTE — TELEPHONE ENCOUNTER
Pt called into office following up on Buspar medication  Pt states he feels good on medication but is doesn't last long  Requesting Increased dose  Also, Patient requesting enhanced BW because of his Family history of some medication related diagnoses in which he is concern of  I explained to him this should be discussed in an appointment in case we needs to order additional BW besides the routine      Please advise

## 2021-04-30 VITALS
WEIGHT: 210.98 LBS | SYSTOLIC BLOOD PRESSURE: 138 MMHG | TEMPERATURE: 98.7 F | HEART RATE: 98 BPM | BODY MASS INDEX: 32.08 KG/M2 | DIASTOLIC BLOOD PRESSURE: 87 MMHG | OXYGEN SATURATION: 98 % | RESPIRATION RATE: 20 BRPM

## 2021-04-30 DIAGNOSIS — F41.9 ANXIETY: ICD-10-CM

## 2021-04-30 RX ORDER — BUSPIRONE HYDROCHLORIDE 15 MG/1
15 TABLET ORAL 2 TIMES DAILY
Qty: 60 TABLET | Refills: 0 | Status: SHIPPED | OUTPATIENT
Start: 2021-04-30 | End: 2021-05-27

## 2021-04-30 RX ORDER — NAPROXEN 500 MG/1
500 TABLET ORAL 2 TIMES DAILY PRN
Qty: 14 TABLET | Refills: 0 | Status: SHIPPED | OUTPATIENT
Start: 2021-04-30 | End: 2021-05-28 | Stop reason: ALTCHOICE

## 2021-04-30 RX ORDER — NAPROXEN 250 MG/1
500 TABLET ORAL ONCE
Status: COMPLETED | OUTPATIENT
Start: 2021-04-30 | End: 2021-04-30

## 2021-04-30 RX ORDER — LIDOCAINE 50 MG/G
1 PATCH TOPICAL EVERY 24 HOURS
Qty: 6 PATCH | Refills: 0 | Status: SHIPPED | OUTPATIENT
Start: 2021-04-30 | End: 2021-09-23 | Stop reason: ALTCHOICE

## 2021-04-30 RX ORDER — METHOCARBAMOL 750 MG/1
750 TABLET, FILM COATED ORAL 3 TIMES DAILY PRN
Qty: 42 TABLET | Refills: 0 | Status: SHIPPED | OUTPATIENT
Start: 2021-04-30 | End: 2021-05-06 | Stop reason: ALTCHOICE

## 2021-04-30 RX ADMIN — NAPROXEN 500 MG: 250 TABLET ORAL at 00:21

## 2021-04-30 NOTE — DISCHARGE INSTRUCTIONS
Cervical Strain   WHAT YOU NEED TO KNOW:   A cervical strain is a stretched or torn muscle or tendon in your neck  Tendons are strong tissues that connect muscles to bones  Common causes of cervical strains include a car accident, a fall, or a sports injury  DISCHARGE INSTRUCTIONS:   Return to the emergency department if:   You have pain or numbness from your shoulder down to your hand  You have problems with your vision, hearing, or balance  You feel confused or cannot concentrate  You have problems with movement and strength  Contact your healthcare provider if:   You have increased swelling or pain in your neck  You have questions or concerns about your condition or care  Medicines:    NSAIDs , such as ibuprofen, help decrease swelling, pain, and fever  NSAIDs can cause stomach bleeding or kidney problems in certain people  Muscle relaxers  help decrease pain and muscle spasms  Manage your symptoms:   Apply heat  on your neck for 15 to 20 minutes, 4 to 6 times a day or as directed  Heat helps decrease pain, stiffness, and muscle spasms  Begin gentle neck exercises  as soon as you can move your neck without pain  Exercises will help decrease stiffness and improve the strength and movement of your neck  Ask your healthcare provider what kind of exercises you should do  Gradually return to your usual activities as directed  Stop if you have pain  Avoid activities that can cause more damage to your neck, such as heavy lifting or strenuous exercise  Sleep without a pillow  to help decrease pain  Instead, roll a small towel tightly and place it under your neck
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
## 2021-04-30 NOTE — TELEPHONE ENCOUNTER
Lm for patient to call back  Patient should be scheduled for an appointment with Dr Lindsey Sanchez, and informed of prescription sent to pharmacy for increased dose of buspar

## 2021-04-30 NOTE — TELEPHONE ENCOUNTER
Patient called back; informed of Rx  busPIRone 15 mg tablet was sent to wrong pharmacy; patient needs Rx resent to [address] [facility]  Order pending  Called [facility] to cancel Rx sent last night  Patient needs work note to excuse him from work this Saturday 5/1 and Sunday 5/2  Patient lifts heavy weight at work, and has recently injured back in [address]  Patient will be seen in office 5/3/2021  If appropriate, will write letter  Thank you!
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
## 2021-05-04 DIAGNOSIS — K22.719 BARRETT'S ESOPHAGUS WITH DYSPLASIA: ICD-10-CM

## 2021-05-04 DIAGNOSIS — K21.00 GASTROESOPHAGEAL REFLUX DISEASE WITH ESOPHAGITIS WITHOUT HEMORRHAGE: ICD-10-CM

## 2021-05-04 RX ORDER — PANTOPRAZOLE SODIUM 40 MG/1
TABLET, DELAYED RELEASE ORAL
Qty: 30 TABLET | Refills: 1 | OUTPATIENT
Start: 2021-05-04

## 2021-05-05 ENCOUNTER — OFFICE VISIT (OUTPATIENT)
Dept: FAMILY MEDICINE CLINIC | Facility: CLINIC | Age: 26
End: 2021-05-05
Payer: COMMERCIAL

## 2021-05-05 VITALS
BODY MASS INDEX: 32.28 KG/M2 | OXYGEN SATURATION: 98 % | DIASTOLIC BLOOD PRESSURE: 80 MMHG | SYSTOLIC BLOOD PRESSURE: 110 MMHG | WEIGHT: 213 LBS | RESPIRATION RATE: 18 BRPM | HEIGHT: 68 IN | HEART RATE: 100 BPM

## 2021-05-05 DIAGNOSIS — Z00.00 ANNUAL PHYSICAL EXAM: Primary | ICD-10-CM

## 2021-05-05 DIAGNOSIS — K21.00 GASTROESOPHAGEAL REFLUX DISEASE WITH ESOPHAGITIS WITHOUT HEMORRHAGE: ICD-10-CM

## 2021-05-05 DIAGNOSIS — F41.9 ANXIETY: ICD-10-CM

## 2021-05-05 DIAGNOSIS — K52.9 CHRONIC DIARRHEA: ICD-10-CM

## 2021-05-05 DIAGNOSIS — K22.719 BARRETT'S ESOPHAGUS WITH DYSPLASIA: ICD-10-CM

## 2021-05-05 DIAGNOSIS — E66.9 OBESITY, CLASS I, BMI 30-34.9: ICD-10-CM

## 2021-05-05 PROCEDURE — 99395 PREV VISIT EST AGE 18-39: CPT | Performed by: INTERNAL MEDICINE

## 2021-05-05 PROCEDURE — 3725F SCREEN DEPRESSION PERFORMED: CPT | Performed by: INTERNAL MEDICINE

## 2021-05-05 RX ORDER — PANTOPRAZOLE SODIUM 40 MG/1
40 TABLET, DELAYED RELEASE ORAL DAILY
Qty: 90 TABLET | Refills: 3 | Status: SHIPPED | OUTPATIENT
Start: 2021-05-05 | End: 2022-02-21

## 2021-05-06 ENCOUNTER — APPOINTMENT (OUTPATIENT)
Dept: RADIOLOGY | Facility: MEDICAL CENTER | Age: 26
End: 2021-05-06
Payer: COMMERCIAL

## 2021-05-06 ENCOUNTER — OFFICE VISIT (OUTPATIENT)
Dept: FAMILY MEDICINE CLINIC | Facility: CLINIC | Age: 26
End: 2021-05-06
Payer: COMMERCIAL

## 2021-05-06 VITALS
HEART RATE: 92 BPM | OXYGEN SATURATION: 98 % | WEIGHT: 211 LBS | TEMPERATURE: 97.9 F | DIASTOLIC BLOOD PRESSURE: 88 MMHG | SYSTOLIC BLOOD PRESSURE: 145 MMHG | BODY MASS INDEX: 31.98 KG/M2 | HEIGHT: 68 IN

## 2021-05-06 DIAGNOSIS — M54.41 ACUTE MIDLINE LOW BACK PAIN WITH RIGHT-SIDED SCIATICA: ICD-10-CM

## 2021-05-06 DIAGNOSIS — M54.41 ACUTE MIDLINE LOW BACK PAIN WITH RIGHT-SIDED SCIATICA: Primary | ICD-10-CM
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
## 2021-05-06 PROBLEM — K52.9 CHRONIC DIARRHEA: Status: ACTIVE | Noted: 2021-05-06

## 2021-05-06 PROCEDURE — 99213 OFFICE O/P EST LOW 20 MIN: CPT | Performed by: INTERNAL MEDICINE

## 2021-05-06 PROCEDURE — 72100 X-RAY EXAM L-S SPINE 2/3 VWS: CPT

## 2021-05-06 RX ORDER — CELECOXIB 200 MG/1
200 CAPSULE ORAL 2 TIMES DAILY
Qty: 20 CAPSULE | Refills: 0 | Status: SHIPPED | OUTPATIENT
Start: 2021-05-06 | End: 2021-05-17
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
## 2021-05-06 NOTE — LETTER
May 6, 2021     Patient: Larisa Pate   YOB: 1995   Date of Visit: 5/6/2021       To Whom it May Concern:    Larisa Pate is under my professional care  Patient will be excused from work from  04/30/2021 until  06/07/2021  If you have any questions or concerns, please don't hesitate to call           Sincerely,          Danita Lira MD        CC: No Recipients
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
## 2021-05-10 ENCOUNTER — LAB (OUTPATIENT)
Dept: LAB | Facility: MEDICAL CENTER | Age: 26
End: 2021-05-10
Payer: COMMERCIAL

## 2021-05-10 DIAGNOSIS — K52.9 CHRONIC DIARRHEA: ICD-10-CM

## 2021-05-10 DIAGNOSIS — E66.9 OBESITY, CLASS I, BMI 30-34.9: ICD-10-CM

## 2021-05-10 LAB
25(OH)D3 SERPL-MCNC: 6.8 NG/ML (ref 30–100)
ALBUMIN SERPL BCP-MCNC: 4.3 G/DL (ref 3.5–5)
ALP SERPL-CCNC: 76 U/L (ref 46–116)
ALT SERPL W P-5'-P-CCNC: 102 U/L (ref 12–78)
ANION GAP SERPL CALCULATED.3IONS-SCNC: 7 MMOL/L (ref 4–13)
AST SERPL W P-5'-P-CCNC: 41 U/L (ref 5–45)
BASOPHILS # BLD AUTO: 0.02 THOUSANDS/ΜL (ref 0–0.1)
BASOPHILS NFR BLD AUTO: 0 % (ref 0–1)
BILIRUB SERPL-MCNC: 1.08 MG/DL (ref 0.2–1)
BILIRUB UR QL STRIP: NEGATIVE
BUN SERPL-MCNC: 12 MG/DL (ref 5–25)
CALCIUM SERPL-MCNC: 9.2 MG/DL (ref 8.3–10.1)
CHLORIDE SERPL-SCNC: 105 MMOL/L (ref 100–108)
CHOLEST SERPL-MCNC: 255 MG/DL (ref 50–200)
CLARITY UR: CLEAR
CO2 SERPL-SCNC: 26 MMOL/L (ref 21–32)
COLOR UR: YELLOW
CREAT SERPL-MCNC: 0.83 MG/DL (ref 0.6–1.3)
EOSINOPHIL # BLD AUTO: 0.08 THOUSAND/ΜL (ref 0–0.61)
EOSINOPHIL NFR BLD AUTO: 2 % (ref 0–6)
ERYTHROCYTE [DISTWIDTH] IN BLOOD BY AUTOMATED COUNT: 12.2 % (ref 11.6–15.1)
GFR SERPL CREATININE-BSD FRML MDRD: 122 ML/MIN/1.73SQ M
GLUCOSE P FAST SERPL-MCNC: 96 MG/DL (ref 65–99)
GLUCOSE UR STRIP-MCNC: NEGATIVE MG/DL
HCT VFR BLD AUTO: 47.2 % (ref 36.5–49.3)
HDLC SERPL-MCNC: 37 MG/DL
HGB BLD-MCNC: 16.2 G/DL (ref 12–17)
HGB UR QL STRIP.AUTO: NEGATIVE
IGA SERPL-MCNC: 175 MG/DL (ref 70–400)
IMM GRANULOCYTES # BLD AUTO: 0.02 THOUSAND/UL (ref 0–0.2)
IMM GRANULOCYTES NFR BLD AUTO: 0 % (ref 0–2)
KETONES UR STRIP-MCNC: NEGATIVE MG/DL
LEUKOCYTE ESTERASE UR QL STRIP: NEGATIVE
LYMPHOCYTES # BLD AUTO: 1.89 THOUSANDS/ΜL (ref 0.6–4.47)
LYMPHOCYTES NFR BLD AUTO: 37 % (ref 14–44)
MCH RBC QN AUTO: 31 PG (ref 26.8–34.3)
MCHC RBC AUTO-ENTMCNC: 34.3 G/DL (ref 31.4–37.4)
MCV RBC AUTO: 90 FL (ref 82–98)
MONOCYTES # BLD AUTO: 0.43 THOUSAND/ΜL (ref 0.17–1.22)
MONOCYTES NFR BLD AUTO: 8 % (ref 4–12)
NEUTROPHILS # BLD AUTO: 2.74 THOUSANDS/ΜL (ref 1.85–7.62)
NEUTS SEG NFR BLD AUTO: 53 % (ref 43–75)
NITRITE UR QL STRIP: NEGATIVE
NONHDLC SERPL-MCNC: 218 MG/DL
NRBC BLD AUTO-RTO: 0 /100 WBCS
PH UR STRIP.AUTO: 5.5 [PH]
PLATELET # BLD AUTO: 252 THOUSANDS/UL (ref 149–390)
PMV BLD AUTO: 10.4 FL (ref 8.9–12.7)
POTASSIUM SERPL-SCNC: 4.1 MMOL/L (ref 3.5–5.3)
PROT SERPL-MCNC: 7.4 G/DL (ref 6.4–8.2)
PROT UR STRIP-MCNC: NEGATIVE MG/DL
RBC # BLD AUTO: 5.23 MILLION/UL (ref 3.88–5.62)
SODIUM SERPL-SCNC: 138 MMOL/L (ref 136–145)
SP GR UR STRIP.AUTO: 1.02 (ref 1–1.03)
TRIGL SERPL-MCNC: 417 MG/DL
TSH SERPL DL<=0.05 MIU/L-ACNC: 2.58 UIU/ML (ref 0.36–3.74)
UROBILINOGEN UR QL STRIP.AUTO: 0.2 E.U./DL
WBC # BLD AUTO: 5.18 THOUSAND/UL (ref 4.31–10.16)

## 2021-05-10 PROCEDURE — 80053 COMPREHEN METABOLIC PANEL: CPT

## 2021-05-10 PROCEDURE — 36415 COLL VENOUS BLD VENIPUNCTURE: CPT

## 2021-05-10 PROCEDURE — 83516 IMMUNOASSAY NONANTIBODY: CPT

## 2021-05-10 PROCEDURE — 80061 LIPID PANEL: CPT

## 2021-05-10 PROCEDURE — 81003 URINALYSIS AUTO W/O SCOPE: CPT | Performed by: INTERNAL MEDICINE

## 2021-05-10 PROCEDURE — 82784 ASSAY IGA/IGD/IGG/IGM EACH: CPT

## 2021-05-10 PROCEDURE — 83036 HEMOGLOBIN GLYCOSYLATED A1C: CPT

## 2021-05-10 PROCEDURE — 82306 VITAMIN D 25 HYDROXY: CPT

## 2021-05-10 PROCEDURE — 85025 COMPLETE CBC W/AUTO DIFF WBC: CPT

## 2021-05-10 PROCEDURE — 84443 ASSAY THYROID STIM HORMONE: CPT

## 2021-05-11 LAB
EST. AVERAGE GLUCOSE BLD GHB EST-MCNC: 85 MG/DL
HBA1C MFR BLD: 4.6 %

## 2021-05-11 NOTE — RESULT ENCOUNTER NOTE
Please call pt - he will need to followup with pcp - dr Cuba Paul - upon his return to review some lab abnormalities  Advise at least a virtual visit with dr Cuba Paul to discuss the followin  Very elevated triglycerides - advise in the interim - limiting simple sugars, sweets, white floured products,   2   Elevated ALT - one of the liver enzymes - avoid hepatotoxins - ie: excessive intake of etoh, tylenol or motrin like products; avoid supplements  3  Low vit D - dr Cuba Paul can consider ordering high dose vit D   AGAIN - ADVISE AT LEAST A VIRTUAL VISIT WITH DR Rolf Mendoza - ONCE HE RETURNS

## 2021-05-12 LAB — TTG IGA SER-ACNC: <2 U/ML (ref 0–3)

## 2021-05-13 NOTE — RESULT ENCOUNTER NOTE
Please inform pt that I will defer vit D supplementation to pcp - to discuss with pt at upcoming appt 5/17/2021  Thanks!

## 2021-05-17 ENCOUNTER — TELEMEDICINE (OUTPATIENT)
Dept: FAMILY MEDICINE CLINIC | Facility: CLINIC | Age: 26
End: 2021-05-17
Payer: COMMERCIAL

## 2021-05-17 DIAGNOSIS — E55.9 VITAMIN D DEFICIENCY: ICD-10-CM

## 2021-05-17 DIAGNOSIS — E78.2 MIXED HYPERLIPIDEMIA: ICD-10-CM

## 2021-05-17 DIAGNOSIS — M54.41 ACUTE MIDLINE LOW BACK PAIN WITH RIGHT-SIDED SCIATICA: ICD-10-CM

## 2021-05-17 DIAGNOSIS — E55.9 VITAMIN D DEFICIENCY: Primary | ICD-10-CM
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
## 2021-05-17 PROCEDURE — 99214 OFFICE O/P EST MOD 30 MIN: CPT | Performed by: INTERNAL MEDICINE

## 2021-05-17 RX ORDER — CELECOXIB 200 MG/1
CAPSULE ORAL
Qty: 20 CAPSULE | Refills: 0 | Status: SHIPPED | OUTPATIENT
Start: 2021-05-17 | End: 2021-05-28 | Stop reason: ALTCHOICE

## 2021-05-17 RX ORDER — ERGOCALCIFEROL 1.25 MG/1
50000 CAPSULE ORAL WEEKLY
Qty: 8 CAPSULE | Refills: 0 | Status: SHIPPED | OUTPATIENT
Start: 2021-05-17 | End: 2021-09-23 | Stop reason: ALTCHOICE

## 2021-05-17 RX ORDER — ERGOCALCIFEROL 1.25 MG/1
CAPSULE ORAL
Qty: 13 CAPSULE | OUTPATIENT
Start: 2021-05-17

## 2021-05-17 RX ORDER — ATORVASTATIN CALCIUM 40 MG/1
40 TABLET, FILM COATED ORAL DAILY
Qty: 90 TABLET | Refills: 0 | Status: SHIPPED | OUTPATIENT
Start: 2021-05-17 | End: 2021-07-28 | Stop reason: ALTCHOICE

## 2021-05-17 RX ORDER — MELATONIN
1000 DAILY
Qty: 90 TABLET | Refills: 3 | Status: SHIPPED | OUTPATIENT
Start: 2021-05-17 | End: 2021-08-15

## 2021-05-17 NOTE — PROGRESS NOTES
Virtual Brief Visit    Assessment/Plan:    Problem List Items Addressed This Visit        Other    Vitamin D deficiency - Primary    Relevant Medications    ergocalciferol (VITAMIN D2) 50,000 units    cholecalciferol (VITAMIN D3) 1,000 units tablet    Other Relevant Orders    Vitamin D 25 hydroxy    Mixed hyperlipidemia    Relevant Medications    atorvastatin (LIPITOR) 40 mg tablet    Other Relevant Orders    Lipid panel                Reason for visit is   Chief Complaint   Patient presents with    Virtual Brief Visit        Encounter provider Michelle Merida MD    Provider located at [place] [address] [place] [id number] [phone]    Recent Visits  No visits were found meeting these conditions  Showing recent visits within past 7 days and meeting all other requirements     Today's Visits  Date Type Provider Dept   05/17/21 Nathalia Scott MD [place] [id number] [facility]   Showing today's visits and meeting all other requirements     Future Appointments  No visits were found meeting these conditions  Showing future appointments within next 150 days and meeting all other requirements        After connecting through telephone, the patient was identified by name and date of birth  Ruth Rao was informed that this is a telemedicine visit and that the visit is being conducted through telephone  My office door was closed  No one else was in the room  He acknowledged consent and understanding of privacy and security of the platform  The patient has agreed to participate and understands he can discontinue the visit at any time  Patient is aware this is a billable service  Subjective    Ruth Rao is a 23 y o  male  We discussed his blood work results  His cholesterol level is extremely high with LDL being more than 200  His triglycerides are more than 400  He has mildly elevated ALT level and bilirubin  His vitamin-D level was very low, 6 8  Past Medical History:   Diagnosis Date    Anxiety     Hypertension        No past surgical history on file  Current Outpatient Medications   Medication Sig Dispense Refill    atorvastatin (LIPITOR) 40 mg tablet Take 1 tablet (40 mg total) by mouth daily 90 tablet 0    azelastine (ASTELIN) 0 1 % nasal spray 1 spray into each nostril 2 (two) times a day Use in each nostril as directed 1 Bottle 2    busPIRone (BUSPAR) 15 mg tablet Take 1 tablet (15 mg total) by mouth 2 (two) times a day 60 tablet 0    celecoxib (CeleBREX) 200 mg capsule Take 1 capsule (200 mg total) by mouth 2 (two) times a day for 10 days 20 capsule 0    cholecalciferol (VITAMIN D3) 1,000 units tablet Take 1 tablet (1,000 Units total) by mouth daily Start after done with the high-dose  90 tablet 3    ergocalciferol (VITAMIN D2) 50,000 units Take 1 capsule (50,000 Units total) by mouth once a week for 8 doses 8 capsule 0    fluticasone (FLONASE) 50 mcg/act nasal spray 2 sprays into each nostril daily 1 Bottle 2    lidocaine (LIDODERM) 5 % Apply 1 patch topically every 24 hours Remove & Discard patch within 12 hours or as directed by MD 6 patch 0    montelukast (SINGULAIR) 10 mg tablet Take 1 tablet (10 mg total) by mouth daily at bedtime 90 tablet 0    naproxen (NAPROSYN) 500 mg tablet Take 1 tablet (500 mg total) by mouth 2 (two) times a day as needed for mild pain or moderate pain for up to 14 doses 14 tablet 0    pantoprazole (PROTONIX) 40 mg tablet Take 1 tablet (40 mg total) by mouth daily 90 tablet 3     No current facility-administered medications for this visit  Allergies   Allergen Reactions    Penicillins Hives       Review of Systems   Unable to perform ROS: Other       There were no vitals filed for this visit        I spent 15 minutes directly with the patient during this visit    VIRTUAL VISIT DISCLAIMER    Talon Morel acknowledges that he has consented to an online visit or consultation  He understands that the online visit is based solely on information provided by him, and that, in the absence of a face-to-face physical evaluation by the physician, the diagnosis he receives is both limited and provisional in terms of accuracy and completeness  This is not intended to replace a full medical face-to-face evaluation by the physician  Xavi Orozco understands and accepts these terms

## 2021-05-19 ENCOUNTER — TELEPHONE (OUTPATIENT)
Dept: FAMILY MEDICINE CLINIC | Facility: CLINIC | Age: 26
End: 2021-05-19

## 2021-05-27 DIAGNOSIS — F41.9 ANXIETY: ICD-10-CM

## 2021-05-27 RX ORDER — BUSPIRONE HYDROCHLORIDE 15 MG/1
TABLET ORAL
Qty: 180 TABLET | Refills: 0 | Status: SHIPPED | OUTPATIENT
Start: 2021-05-27 | End: 2021-05-28 | Stop reason: ALTCHOICE

## 2021-05-28 ENCOUNTER — APPOINTMENT (OUTPATIENT)
Dept: RADIOLOGY | Facility: MEDICAL CENTER | Age: 26
End: 2021-05-28
Payer: COMMERCIAL

## 2021-05-28 ENCOUNTER — HOSPITAL ENCOUNTER (EMERGENCY)
Facility: HOSPITAL | Age: 26
Discharge: HOME | End: 2021-05-28
Attending: EMERGENCY MEDICINE | Admitting: EMERGENCY MEDICINE
Payer: COMMERCIAL

## 2021-05-28 ENCOUNTER — APPOINTMENT (EMERGENCY)
Dept: CT IMAGING | Facility: HOSPITAL | Age: 26
End: 2021-05-28
Payer: COMMERCIAL

## 2021-05-28 ENCOUNTER — OFFICE VISIT (OUTPATIENT)
Dept: FAMILY MEDICINE CLINIC | Facility: CLINIC | Age: 26
End: 2021-05-28
Payer: COMMERCIAL

## 2021-05-28 VITALS
RESPIRATION RATE: 16 BRPM | TEMPERATURE: 98.2 F | HEART RATE: 84 BPM | SYSTOLIC BLOOD PRESSURE: 138 MMHG | DIASTOLIC BLOOD PRESSURE: 72 MMHG | WEIGHT: 210.76 LBS | BODY MASS INDEX: 32.05 KG/M2 | OXYGEN SATURATION: 100 %

## 2021-05-28 VITALS
DIASTOLIC BLOOD PRESSURE: 104 MMHG | WEIGHT: 215 LBS | HEART RATE: 82 BPM | TEMPERATURE: 97.5 F | RESPIRATION RATE: 16 BRPM | HEIGHT: 68 IN | SYSTOLIC BLOOD PRESSURE: 138 MMHG | BODY MASS INDEX: 32.58 KG/M2

## 2021-05-28 DIAGNOSIS — F41.9 ANXIETY: ICD-10-CM

## 2021-05-28 DIAGNOSIS — M54.2 NECK PAIN: Primary | ICD-10-CM
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
## 2021-05-28 DIAGNOSIS — T14.8XXA ABRASION: ICD-10-CM

## 2021-05-28 DIAGNOSIS — M54.2 NECK PAIN: ICD-10-CM

## 2021-05-28 DIAGNOSIS — R42 DIZZINESS: ICD-10-CM

## 2021-05-28 LAB
ALBUMIN SERPL BCP-MCNC: 4.6 G/DL (ref 3.5–5)
ALP SERPL-CCNC: 76 U/L (ref 46–116)
ALT SERPL W P-5'-P-CCNC: 81 U/L (ref 12–78)
ANION GAP SERPL CALCULATED.3IONS-SCNC: 12 MMOL/L (ref 4–13)
AST SERPL W P-5'-P-CCNC: 33 U/L (ref 5–45)
BASOPHILS # BLD AUTO: 0.04 THOUSANDS/ΜL (ref 0–0.1)
BASOPHILS NFR BLD AUTO: 1 % (ref 0–1)
BILIRUB SERPL-MCNC: 0.88 MG/DL (ref 0.2–1)
BUN SERPL-MCNC: 16 MG/DL (ref 5–25)
CALCIUM SERPL-MCNC: 9.4 MG/DL (ref 8.3–10.1)
CHLORIDE SERPL-SCNC: 104 MMOL/L (ref 100–108)
CO2 SERPL-SCNC: 26 MMOL/L (ref 21–32)
CREAT SERPL-MCNC: 0.86 MG/DL (ref 0.6–1.3)
EOSINOPHIL # BLD AUTO: 0.03 THOUSAND/ΜL (ref 0–0.61)
EOSINOPHIL NFR BLD AUTO: 0 % (ref 0–6)
ERYTHROCYTE [DISTWIDTH] IN BLOOD BY AUTOMATED COUNT: 12.1 % (ref 11.6–15.1)
GFR SERPL CREATININE-BSD FRML MDRD: 121 ML/MIN/1.73SQ M
GLUCOSE SERPL-MCNC: 87 MG/DL (ref 65–140)
HCT VFR BLD AUTO: 45.9 % (ref 36.5–49.3)
HGB BLD-MCNC: 16.2 G/DL (ref 12–17)
IMM GRANULOCYTES # BLD AUTO: 0.05 THOUSAND/UL (ref 0–0.2)
IMM GRANULOCYTES NFR BLD AUTO: 1 % (ref 0–2)
LYMPHOCYTES # BLD AUTO: 1.68 THOUSANDS/ΜL (ref 0.6–4.47)
LYMPHOCYTES NFR BLD AUTO: 21 % (ref 14–44)
MCH RBC QN AUTO: 31.8 PG (ref 26.8–34.3)
MCHC RBC AUTO-ENTMCNC: 35.3 G/DL (ref 31.4–37.4)
MCV RBC AUTO: 90 FL (ref 82–98)
MONOCYTES # BLD AUTO: 0.63 THOUSAND/ΜL (ref 0.17–1.22)
MONOCYTES NFR BLD AUTO: 8 % (ref 4–12)
NEUTROPHILS # BLD AUTO: 5.77 THOUSANDS/ΜL (ref 1.85–7.62)
NEUTS SEG NFR BLD AUTO: 69 % (ref 43–75)
NRBC BLD AUTO-RTO: 0 /100 WBCS
PLATELET # BLD AUTO: 274 THOUSANDS/UL (ref 149–390)
PMV BLD AUTO: 10.3 FL (ref 8.9–12.7)
POTASSIUM SERPL-SCNC: 3.9 MMOL/L (ref 3.5–5.3)
PROT SERPL-MCNC: 7.6 G/DL (ref 6.4–8.2)
RBC # BLD AUTO: 5.1 MILLION/UL (ref 3.88–5.62)
SODIUM SERPL-SCNC: 142 MMOL/L (ref 136–145)
WBC # BLD AUTO: 8.2 THOUSAND/UL (ref 4.31–10.16)

## 2021-05-28 PROCEDURE — 85025 COMPLETE CBC W/AUTO DIFF WBC: CPT | Performed by: PHYSICIAN ASSISTANT

## 2021-05-28 PROCEDURE — 3008F BODY MASS INDEX DOCD: CPT | Performed by: INTERNAL MEDICINE

## 2021-05-28 PROCEDURE — 96374 THER/PROPH/DIAG INJ IV PUSH: CPT

## 2021-05-28 PROCEDURE — 72125 CT NECK SPINE W/O DYE: CPT

## 2021-05-28 PROCEDURE — 99284 EMERGENCY DEPT VISIT MOD MDM: CPT

## 2021-05-28 PROCEDURE — G1004 CDSM NDSC: HCPCS

## 2021-05-28 PROCEDURE — 99213 OFFICE O/P EST LOW 20 MIN: CPT | Performed by: INTERNAL MEDICINE

## 2021-05-28 PROCEDURE — 72040 X-RAY EXAM NECK SPINE 2-3 VW: CPT

## 2021-05-28 PROCEDURE — 36415 COLL VENOUS BLD VENIPUNCTURE: CPT | Performed by: PHYSICIAN ASSISTANT

## 2021-05-28 PROCEDURE — 99283 EMERGENCY DEPT VISIT LOW MDM: CPT | Performed by: PHYSICIAN ASSISTANT

## 2021-05-28 PROCEDURE — 90715 TDAP VACCINE 7 YRS/> IM: CPT | Performed by: PHYSICIAN ASSISTANT

## 2021-05-28 PROCEDURE — 80053 COMPREHEN METABOLIC PANEL: CPT | Performed by: PHYSICIAN ASSISTANT

## 2021-05-28 PROCEDURE — 74177 CT ABD & PELVIS W/CONTRAST: CPT

## 2021-05-28 PROCEDURE — 70450 CT HEAD/BRAIN W/O DYE: CPT

## 2021-05-28 PROCEDURE — 90471 IMMUNIZATION ADMIN: CPT

## 2021-05-28 RX ORDER — MORPHINE SULFATE 4 MG/ML
4 INJECTION, SOLUTION INTRAMUSCULAR; INTRAVENOUS ONCE
Status: COMPLETED | OUTPATIENT
Start: 2021-05-28 | End: 2021-05-28

## 2021-05-28 RX ORDER — BUSPIRONE HYDROCHLORIDE 30 MG/1
30 TABLET ORAL 2 TIMES DAILY
Qty: 60 TABLET | Refills: 0 | Status: SHIPPED | OUTPATIENT
Start: 2021-05-28 | End: 2021-06-25

## 2021-05-28 RX ORDER — MECLIZINE HYDROCHLORIDE 25 MG/1
25 TABLET ORAL EVERY 8 HOURS SCHEDULED
Qty: 15 TABLET | Refills: 0 | Status: SHIPPED | OUTPATIENT
Start: 2021-05-28

## 2021-05-28 RX ORDER — PREDNISONE 20 MG/1
TABLET ORAL
Qty: 16 TABLET | Refills: 0 | Status: SHIPPED | OUTPATIENT
Start: 2021-05-28 | End: 2021-09-23 | Stop reason: ALTCHOICE

## 2021-05-28 RX ADMIN — MORPHINE SULFATE 4 MG: 4 INJECTION INTRAVENOUS at 21:30

## 2021-05-28 RX ADMIN — IOHEXOL 100 ML: 350 INJECTION, SOLUTION INTRAVENOUS at 21:49

## 2021-05-28 RX ADMIN — TETANUS TOXOID, REDUCED DIPHTHERIA TOXOID AND ACELLULAR PERTUSSIS VACCINE, ADSORBED 0.5 ML: 5; 2.5; 8; 8; 2.5 SUSPENSION INTRAMUSCULAR at 21:34
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
## 2021-05-28 NOTE — PATIENT INSTRUCTIONS
Try to take meclizine as needed for dizziness see if it helps let me know if it does not help just done take it  Use prednisone tapering dose as described on your bottle  Please do x-ray of your C-spine    Please take BuSpar 30 mg twice a day  Let me know in few weeks how you feel
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
## 2021-05-29 NOTE — DISCHARGE INSTRUCTIONS
Results for orders placed or performed during the hospital encounter of 05/28/21   CBC and differential   Result Value Ref Range    WBC 8 20 4  31 - 10 16 Thousand/uL    RBC 5 10 3 88 - 5 62 Million/uL    Hemoglobin 16 2 12 0 - 17 0 g/dL    Hematocrit 45 9 36 5 - 49 3 %    MCV 90 82 - 98 fL    MCH 31 8 26 8 - 34 3 pg    MCHC 35 3 31 4 - 37 4 g/dL    RDW 12 1 11 6 - 15 1 %    MPV 10 3 8 9 - 12 7 fL    Platelets 873 220 - 806 Thousands/uL    nRBC 0 /100 WBCs    Neutrophils Relative 69 43 - 75 %    Immat GRANS % 1 0 - 2 %    Lymphocytes Relative 21 14 - 44 %    Monocytes Relative 8 4 - 12 %    Eosinophils Relative 0 0 - 6 %    Basophils Relative 1 0 - 1 %    Neutrophils Absolute 5 77 1 85 - 7 62 Thousands/µL    Immature Grans Absolute 0 05 0 00 - 0 20 Thousand/uL    Lymphocytes Absolute 1 68 0 60 - 4 47 Thousands/µL    Monocytes Absolute 0 63 0 17 - 1 22 Thousand/µL    Eosinophils Absolute 0 03 0 00 - 0 61 Thousand/µL    Basophils Absolute 0 04 0 00 - 0 10 Thousands/µL   Comprehensive metabolic panel   Result Value Ref Range    Sodium 142 136 - 145 mmol/L    Potassium 3 9 3 5 - 5 3 mmol/L    Chloride 104 100 - 108 mmol/L    CO2 26 21 - 32 mmol/L    ANION GAP 12 4 - 13 mmol/L    BUN 16 5 - 25 mg/dL    Creatinine 0 86 0 60 - 1 30 mg/dL    Glucose 87 65 - 140 mg/dL    Calcium 9 4 8 3 - 10 1 mg/dL    AST 33 5 - 45 U/L    ALT 81 (H) 12 - 78 U/L    Alkaline Phosphatase 76 46 - 116 U/L    Total Protein 7 6 6 4 - 8 2 g/dL    Albumin 4 6 3 5 - 5 0 g/dL    Total Bilirubin 0 88 0 20 - 1 00 mg/dL    eGFR 121 ml/min/1 73sq m     CT abdomen pelvis with contrast   Final Result      No acute traumatic injury identified within the abdomen or pelvis  Workstation performed: [id number]         CT head without contrast   Final Result      No intracranial hemorrhage or calvarial fracture                    Workstation performed: [id number]         CT cervical spine without contrast   Final Result      No cervical spine fracture or traumatic malalignment                     Workstation performed: [id number]

## 2021-06-08 ENCOUNTER — TELEMEDICINE (OUTPATIENT)
Dept: FAMILY MEDICINE CLINIC | Facility: CLINIC | Age: 26
End: 2021-06-08
Payer: COMMERCIAL

## 2021-06-08 DIAGNOSIS — J06.9 URI, ACUTE: Primary | ICD-10-CM

## 2021-06-08 PROCEDURE — 1036F TOBACCO NON-USER: CPT | Performed by: INTERNAL MEDICINE

## 2021-06-08 PROCEDURE — 99213 OFFICE O/P EST LOW 20 MIN: CPT | Performed by: INTERNAL MEDICINE

## 2021-06-08 RX ORDER — AZITHROMYCIN 250 MG/1
TABLET, FILM COATED ORAL
Qty: 6 TABLET | Refills: 0 | Status: SHIPPED | OUTPATIENT
Start: 2021-06-08 | End: 2021-06-13

## 2021-06-08 RX ORDER — OXYMETAZOLINE HYDROCHLORIDE 0.05 G/100ML
2 SPRAY NASAL 2 TIMES DAILY
Qty: 30 ML | Refills: 0 | Status: SHIPPED | OUTPATIENT
Start: 2021-06-08 | End: 2021-09-23 | Stop reason: ALTCHOICE

## 2021-06-08 NOTE — PROGRESS NOTES
Virtual Brief Visit    Assessment/Plan:    Problem List Items Addressed This Visit     None      Visit Diagnoses     URI, acute    -  Primary    Relevant Medications    oxymetazoline (Afrin 12 Hour) 0 05 % nasal spray    azithromycin (Zithromax) 250 mg tablet        Will start azithromycin, will use Afrin spray  He will let me know in few days of how he feels  Reason for visit is   Chief Complaint   Patient presents with    Virtual Brief Visit        Encounter provider Kayy Mccallum MD    Provider located at [address] [address] [phone]    Recent Visits  No visits were found meeting these conditions  Showing recent visits within past 7 days and meeting all other requirements     Today's Visits  Date Type Provider Dept   06/08/21 Stefani Mckeon MD [place] [address] Primary Care   Showing today's visits and meeting all other requirements     Future Appointments  No visits were found meeting these conditions  Showing future appointments within next 150 days and meeting all other requirements        After connecting through telephone, the patient was identified by name and date of birth  Osito Dixon was informed that this is a telemedicine visit and that the visit is being conducted through telephone  My office door was closed  No one else was in the room  He acknowledged consent and understanding of privacy and security of the platform  The patient has agreed to participate and understands he can discontinue the visit at any time  Patient is aware this is a billable service  Subjective    Osito Dixon is a 23 y o  male  Tiney Bearded developed congestion, pain in his throat recently  Z-Freddie was effective for him in the past   He denies any sick contacts recently  He does not have any chills or fevers  No GI symptoms         Past Medical History:   Diagnosis Date    Anxiety     Hypertension        No past surgical history on file  Current Outpatient Medications   Medication Sig Dispense Refill    atorvastatin (LIPITOR) 40 mg tablet Take 1 tablet (40 mg total) by mouth daily (Patient not taking: Reported on 5/28/2021) 90 tablet 0    azelastine (ASTELIN) 0 1 % nasal spray 1 spray into each nostril 2 (two) times a day Use in each nostril as directed 1 Bottle 2    azithromycin (Zithromax) 250 mg tablet Take 2 tablets (500 mg total) by mouth daily for 1 day, THEN 1 tablet (250 mg total) daily for 4 days  6 tablet 0    busPIRone (BUSPAR) 30 MG tablet Take 1 tablet (30 mg total) by mouth 2 (two) times a day 60 tablet 0    cholecalciferol (VITAMIN D3) 1,000 units tablet Take 1 tablet (1,000 Units total) by mouth daily Start after done with the high-dose  90 tablet 3    ergocalciferol (VITAMIN D2) 50,000 units Take 1 capsule (50,000 Units total) by mouth once a week for 8 doses (Patient not taking: Reported on 5/28/2021) 8 capsule 0    fluticasone (FLONASE) 50 mcg/act nasal spray 2 sprays into each nostril daily 1 Bottle 2    lidocaine (LIDODERM) 5 % Apply 1 patch topically every 24 hours Remove & Discard patch within 12 hours or as directed by MD 6 patch 0    meclizine (ANTIVERT) 25 mg tablet Take 1 tablet (25 mg total) by mouth every 8 (eight) hours (Patient not taking: Reported on 5/28/2021) 15 tablet 0    montelukast (SINGULAIR) 10 mg tablet Take 1 tablet (10 mg total) by mouth daily at bedtime 90 tablet 0    oxymetazoline (Afrin 12 Hour) 0 05 % nasal spray 2 sprays by Each Nare route 2 (two) times a day 30 mL 0    pantoprazole (PROTONIX) 40 mg tablet Take 1 tablet (40 mg total) by mouth daily 90 tablet 3    predniSONE 20 mg tablet Take 3 pills on day 1, 2 and 3, 2 pills on day 4, 5 and 6, 1 pill on day 7 then stop (Patient not taking: Reported on 5/28/2021) 16 tablet 0     No current facility-administered medications for this visit           Allergies   Allergen Reactions    Penicillins Hives       Review of Systems   Constitutional: Negative for appetite change, chills, fatigue and fever  HENT: Positive for congestion, rhinorrhea and sore throat  Negative for sinus pain  Respiratory: Negative for cough, chest tightness and shortness of breath  Cardiovascular: Negative for chest pain  Gastrointestinal: Negative for diarrhea, nausea and vomiting  Musculoskeletal: Negative for arthralgias and myalgias  There were no vitals filed for this visit  I spent 5 minutes directly with the patient during this visit    VIRTUAL VISIT DISCLAIMER    Reuben Calloway acknowledges that he has consented to an online visit or consultation  He understands that the online visit is based solely on information provided by him, and that, in the absence of a face-to-face physical evaluation by the physician, the diagnosis he receives is both limited and provisional in terms of accuracy and completeness  This is not intended to replace a full medical face-to-face evaluation by the physician  Reuben Calloway understands and accepts these terms

## 2021-06-25 DIAGNOSIS — F41.9 ANXIETY: ICD-10-CM

## 2021-06-25 RX ORDER — BUSPIRONE HYDROCHLORIDE 30 MG/1
TABLET ORAL
Qty: 60 TABLET | Refills: 0 | Status: SHIPPED | OUTPATIENT
Start: 2021-06-25 | End: 2021-07-27

## 2021-07-07 ENCOUNTER — OFFICE VISIT (OUTPATIENT)
Dept: FAMILY MEDICINE CLINIC | Facility: CLINIC | Age: 26
End: 2021-07-07
Payer: COMMERCIAL

## 2021-07-07 VITALS
OXYGEN SATURATION: 98 % | DIASTOLIC BLOOD PRESSURE: 90 MMHG | HEIGHT: 68 IN | HEART RATE: 82 BPM | SYSTOLIC BLOOD PRESSURE: 124 MMHG | WEIGHT: 208.2 LBS | BODY MASS INDEX: 31.55 KG/M2

## 2021-07-07 DIAGNOSIS — K21.00 GASTROESOPHAGEAL REFLUX DISEASE WITH ESOPHAGITIS WITHOUT HEMORRHAGE: ICD-10-CM

## 2021-07-07 DIAGNOSIS — R79.89 ELEVATED LIVER FUNCTION TESTS: ICD-10-CM

## 2021-07-07 DIAGNOSIS — R10.9 ABDOMINAL PAIN, UNSPECIFIED ABDOMINAL LOCATION: Primary | ICD-10-CM

## 2021-07-07 DIAGNOSIS — K22.719 BARRETT'S ESOPHAGUS WITH DYSPLASIA: ICD-10-CM

## 2021-07-07 DIAGNOSIS — R19.5 DARK STOOLS: ICD-10-CM

## 2021-07-07 PROCEDURE — 1036F TOBACCO NON-USER: CPT | Performed by: FAMILY MEDICINE

## 2021-07-07 PROCEDURE — 3008F BODY MASS INDEX DOCD: CPT | Performed by: FAMILY MEDICINE

## 2021-07-07 PROCEDURE — 99214 OFFICE O/P EST MOD 30 MIN: CPT | Performed by: FAMILY MEDICINE

## 2021-07-07 NOTE — PATIENT INSTRUCTIONS
Refer to GI for evaluation  Remain on pantoprazole  [place] diet - avoid lots of fiber, spicy foods or fatty foods  Keep hydrated  If any blood in stool - call immediately or go to ER    nonfasting labs  Patient to call for results if he/she does not hear from us

## 2021-07-07 NOTE — PROGRESS NOTES
FAMILY PRACTICE OFFICE VISIT    NAME: Scott Esparza    AGE: 23 y o  SEX: male  : 1995   MRN: [id number]    DATE: 2021  TIME: 7:23 PM    Assessment and Plan     1  Abdominal pain, unspecified abdominal location  Given persistence of symptoms - change in bowels and dark stools - will send to GI for further eval    Pt with known barretts  Dx at previous dr - unsure when next egd is due   H/o colonoscopy in past as well  No known family h/o colon cancer    Takes PPI  Does not take regular nsaids or asa     - Ambulatory referral to Gastroenterology; Future    2  Dark stools  Unable to check hemoccult today    - Ambulatory referral to Gastroenterology; Future    3  Elevated liver function tests  Repeat labs  Patient to call for results if he/she does not hear from us    Patient Instructions   Refer to GI for evaluation  Remain on pantoprazole  Hume diet - avoid lots of fiber, spicy foods or fatty foods  Keep hydrated  If any blood in stool - call immediately or go to ER  nonfasting labs  Patient to call for results if he/she does not hear from us      F/u with pcp when due  Blood pressure slightly elevated  Limit salt and caffeine intake        Chief Complaint     Chief Complaint   Patient presents with    GI Problem       History of Present Illness   Scott Esparza is a 23y o -year-old male who presents today with his girlfriend  With complaints of abdominal pain -   Worse after having a BM  He has noticed that stool is black the past few days  Took pepto only once (prior to dark stools)  He was dx with barretts in [place] and is taking PPI daily  Symptoms worsened X 3 days ago - and described as a constant stabbing pain  Persisted into the next few days   Has a lot of flatulance  No recent change in diet  Review of Systems   Review of Systems   Constitutional: Positive for unexpected weight change  Negative for fever          Lost 7 # since may 2021 - unintentional   Admits to temp of 100 7 X 3 days ago but not since  No known sick contacts  Feels tired  Respiratory: Negative for cough, shortness of breath and wheezing  Cardiovascular: Negative for chest pain  Gastrointestinal: Positive for abdominal pain, nausea and vomiting  Negative for diarrhea  Taking PPI daily  No blood in stool but stools have been black for a couple of days - but he did take 1 dose of pepto prior    Occasional loose stools  Did have 1 episode of vomiting  Eats ok but then gets bloating  Genitourinary: Negative for dysuria  No urinary complaints         Active Problem List     Patient Active Problem List   Diagnosis    Gastroesophageal reflux disease with esophagitis without hemorrhage    Oden's esophagus with dysplasia    Elevated blood pressure reading in office without diagnosis of hypertension    Anxiety    Allergic rhinitis    Chronic low back pain    Chronic diarrhea    Vitamin D deficiency    Mixed hyperlipidemia         Past Medical History:  Past Medical History:   Diagnosis Date    Anxiety     Hypertension        Past Surgical History:  History reviewed  No pertinent surgical history      Family History:  Family History   Problem Relation Age of Onset    Diabetes Mother     Hypertension Mother     Anxiety disorder Mother     Heart attack Father     Hypertension Father     Anxiety disorder Father        Social History:  Social History     Socioeconomic History    Marital status: Single     Spouse name: Not on file    Number of children: Not on file    Years of education: Not on file    Highest education level: Not on file   Occupational History    Not on file   Tobacco Use    Smoking status: Never Smoker    Smokeless tobacco: Never Used   Vaping Use    Vaping Use: Never used   Substance and Sexual Activity    Alcohol use: Yes     Comment: holiday/special occassion    Drug use: Never    Sexual activity: Yes     Partners: Female   Other Topics Concern    Not on file Social History Narrative    Not on file     Social Determinants of Health     Financial Resource Strain:     Difficulty of Paying Living Expenses:    Food Insecurity:     Worried About Running Out of Food in the Last Year:     [address] in the Last Year:    Transportation Needs:     Lack of Transportation (Medical):  Lack of Transportation (Non-Medical):    Physical Activity:     Days of Exercise per Week:     Minutes of Exercise per Session:    Stress:     Feeling of Stress :    Social Connections:     Frequency of Communication with Friends and Family:     Frequency of Social Gatherings with Friends and Family:     Attends Congregational Services:     Active Member of Clubs or Organizations:     Attends Club or Organization Meetings:     Marital Status:    Intimate Partner Violence:     Fear of Current or Ex-Partner:     Emotionally Abused:     Physically Abused:     Sexually Abused:        Objective     Vitals:    07/07/21 1918   BP: 124/90   Pulse: 82   SpO2: 98%     Wt Readings from Last 3 Encounters:   07/07/21 94 4 kg (208 lb 3 2 oz)   05/28/21 95 6 kg (210 lb 12 2 oz)   05/28/21 97 5 kg (215 lb)       Physical Exam  Vitals and nursing note reviewed  Constitutional:       General: He is not in acute distress  Appearance: Normal appearance  He is normal weight  He is not ill-appearing, toxic-appearing or diaphoretic  HENT:      Mouth/Throat:      Mouth: Mucous membranes are moist       Comments: Mucous membranes moist and pink      Eyes:      Comments: Good conjunctival coloring  Cardiovascular:      Rate and Rhythm: Normal rate and regular rhythm  Pulses: Normal pulses  Heart sounds: Normal heart sounds  No murmur heard  Pulmonary:      Effort: Pulmonary effort is normal  No respiratory distress  Breath sounds: Normal breath sounds  No stridor  No wheezing, rhonchi or rales  Abdominal:      General: Abdomen is flat  There is no distension  Palpations: Abdomen is soft  There is no mass  Tenderness: There is no abdominal tenderness  There is no guarding or rebound  Hernia: No hernia is present  Comments: No discrete abdominal tenderness  No peritoneal signs     Genitourinary:     Comments: Rectal exam - no palpable rectal masses  No stool in vault for hemoccult testing  Musculoskeletal:      Right lower leg: No edema  Left lower leg: No edema  Neurological:      General: No focal deficit present  Mental Status: He is alert  Psychiatric:         Mood and Affect: Mood normal          Behavior: Behavior normal          Thought Content: Thought content normal          Judgment: Judgment normal          Pertinent Laboratory/Diagnostic Studies:  Lab Results   Component Value Date    BUN 16 05/28/2021    CREATININE 0 86 05/28/2021    CALCIUM 9 4 05/28/2021    K 3 9 05/28/2021    CO2 26 05/28/2021     05/28/2021     Lab Results   Component Value Date    ALT 81 (H) 05/28/2021    AST 33 05/28/2021    ALKPHOS 76 05/28/2021       Lab Results   Component Value Date    WBC 8 20 05/28/2021    HGB 16 2 05/28/2021    HCT 45 9 05/28/2021    MCV 90 05/28/2021     05/28/2021       No results found for: TSH    No results found for: CHOL  Lab Results   Component Value Date    TRIG 417 (H) 05/10/2021     Lab Results   Component Value Date    HDL 37 (L) 05/10/2021     Lab Results   Component Value Date    [address]  05/10/2021      Comment:      Calculated LDL invalid, triglycerides >400 mg/dl  This screening LDL is a calculated result  It does not have the accuracy of the Direct Measured LDL in the monitoring of patients with hyperlipidemia and/or statin therapy  Direct Measure LDL (IEN143) must be ordered separately in these patients       Lab Results   Component Value Date    HGBA1C 4 6 05/10/2021       Results for orders placed or performed during the hospital encounter of 05/28/21   CBC and differential   Result Value Ref Range    WBC 8 20 4  31 - 10 16 Thousand/uL    RBC 5 10 3 88 - 5 62 Million/uL    Hemoglobin 16 2 12 0 - 17 0 g/dL    Hematocrit 45 9 36 5 - 49 3 %    MCV 90 82 - 98 fL    MCH 31 8 26 8 - 34 3 pg    MCHC 35 3 31 4 - 37 4 g/dL    RDW 12 1 11 6 - 15 1 %    MPV 10 3 8 9 - 12 7 fL    Platelets 976 668 - 592 Thousands/uL    nRBC 0 /100 WBCs    Neutrophils Relative 69 43 - 75 %    Immat GRANS % 1 0 - 2 %    Lymphocytes Relative 21 14 - 44 %    Monocytes Relative 8 4 - 12 %    Eosinophils Relative 0 0 - 6 %    Basophils Relative 1 0 - 1 %    Neutrophils Absolute 5 77 1 85 - 7 62 Thousands/µL    Immature Grans Absolute 0 05 0 00 - 0 20 Thousand/uL    Lymphocytes Absolute 1 68 0 60 - 4 47 Thousands/µL    Monocytes Absolute 0 63 0 17 - 1 22 Thousand/µL    Eosinophils Absolute 0 03 0 00 - 0 61 Thousand/µL    Basophils Absolute 0 04 0 00 - 0 10 Thousands/µL   Comprehensive metabolic panel   Result Value Ref Range    Sodium 142 136 - 145 mmol/L    Potassium 3 9 3 5 - 5 3 mmol/L    Chloride 104 100 - 108 mmol/L    CO2 26 21 - 32 mmol/L    ANION GAP 12 4 - 13 mmol/L    BUN 16 5 - 25 mg/dL    Creatinine 0 86 0 60 - 1 30 mg/dL    Glucose 87 65 - 140 mg/dL    Calcium 9 4 8 3 - 10 1 mg/dL    AST 33 5 - 45 U/L    ALT 81 (H) 12 - 78 U/L    Alkaline Phosphatase 76 46 - 116 U/L    Total Protein 7 6 6 4 - 8 2 g/dL    Albumin 4 6 3 5 - 5 0 g/dL    Total Bilirubin 0 88 0 20 - 1 00 mg/dL    eGFR 121 ml/min/1 73sq m       No orders of the defined types were placed in this encounter        ALLERGIES:  Allergies   Allergen Reactions    Penicillins Hives       Current Medications     Current Outpatient Medications   Medication Sig Dispense Refill    atorvastatin (LIPITOR) 40 mg tablet Take 1 tablet (40 mg total) by mouth daily (Patient not taking: Reported on 5/28/2021) 90 tablet 0    azelastine (ASTELIN) 0 1 % nasal spray 1 spray into each nostril 2 (two) times a day Use in each nostril as directed 1 Bottle 2    busPIRone (BUSPAR) 30 MG tablet TAKE 1 TABLET(30 MG) BY MOUTH TWICE DAILY 60 tablet 0    cholecalciferol (VITAMIN D3) 1,000 units tablet Take 1 tablet (1,000 Units total) by mouth daily Start after done with the high-dose  90 tablet 3    ergocalciferol (VITAMIN D2) 50,000 units Take 1 capsule (50,000 Units total) by mouth once a week for 8 doses (Patient not taking: Reported on 5/28/2021) 8 capsule 0    fluticasone (FLONASE) 50 mcg/act nasal spray 2 sprays into each nostril daily 1 Bottle 2    lidocaine (LIDODERM) 5 % Apply 1 patch topically every 24 hours Remove & Discard patch within 12 hours or as directed by MD 6 patch 0    meclizine (ANTIVERT) 25 mg tablet Take 1 tablet (25 mg total) by mouth every 8 (eight) hours (Patient not taking: Reported on 5/28/2021) 15 tablet 0    montelukast (SINGULAIR) 10 mg tablet Take 1 tablet (10 mg total) by mouth daily at bedtime 90 tablet 0    oxymetazoline (Afrin 12 Hour) 0 05 % nasal spray 2 sprays by Each Nare route 2 (two) times a day 30 mL 0    pantoprazole (PROTONIX) 40 mg tablet Take 1 tablet (40 mg total) by mouth daily 90 tablet 3    predniSONE 20 mg tablet Take 3 pills on day 1, 2 and 3, 2 pills on day 4, 5 and 6, 1 pill on day 7 then stop (Patient not taking: Reported on 5/28/2021) 16 tablet 0     No current facility-administered medications for this visit           Health Maintenance     Health Maintenance   Topic Date Due    Hepatitis C Screening  Never done    HPV Vaccine (1 - Male 2-dose series) Never done    COVID-19 Vaccine (1) Never done    HIV Screening  Never done    Influenza Vaccine (1) 09/01/2021    Depression Screening PHQ  05/05/2022    Annual Physical  05/05/2022    BMI: Followup Plan  05/06/2022    BMI: Adult  05/28/2022    DTaP,Tdap,and Td Vaccines (2 - Td or Tdap) 05/28/2031    Pneumococcal Vaccine: Pediatrics (0 to 5 Years) and At-Risk Patients (6 to 59 Years)  Aged Out    HIB Vaccine  Aged Out    Hepatitis B Vaccine  Aged Out    IPV Vaccine  Aged Gustavo Schein Hepatitis A Vaccine  Aged Out    Meningococcal ACWY Vaccine  Aged Out     Immunization History   Administered Date(s) Administered    Tdap 05/28/2021          Ej Lei DO

## 2021-07-27 DIAGNOSIS — F41.9 ANXIETY: ICD-10-CM

## 2021-07-27 RX ORDER — BUSPIRONE HYDROCHLORIDE 30 MG/1
TABLET ORAL
Qty: 60 TABLET | Refills: 0 | Status: SHIPPED | OUTPATIENT
Start: 2021-07-27 | End: 2021-08-25

## 2021-07-28 ENCOUNTER — LAB (OUTPATIENT)
Dept: LAB | Facility: MEDICAL CENTER | Age: 26
End: 2021-07-28
Payer: COMMERCIAL

## 2021-07-28 ENCOUNTER — OFFICE VISIT (OUTPATIENT)
Dept: FAMILY MEDICINE CLINIC | Facility: CLINIC | Age: 26
End: 2021-07-28
Payer: COMMERCIAL

## 2021-07-28 VITALS
SYSTOLIC BLOOD PRESSURE: 130 MMHG | HEART RATE: 101 BPM | OXYGEN SATURATION: 98 % | WEIGHT: 214 LBS | DIASTOLIC BLOOD PRESSURE: 88 MMHG | HEIGHT: 68 IN | BODY MASS INDEX: 32.43 KG/M2

## 2021-07-28 DIAGNOSIS — K21.00 GASTROESOPHAGEAL REFLUX DISEASE WITH ESOPHAGITIS WITHOUT HEMORRHAGE: ICD-10-CM

## 2021-07-28 DIAGNOSIS — F41.9 ANXIETY: ICD-10-CM

## 2021-07-28 DIAGNOSIS — E78.2 MIXED HYPERLIPIDEMIA: ICD-10-CM

## 2021-07-28 DIAGNOSIS — E55.9 VITAMIN D DEFICIENCY: ICD-10-CM

## 2021-07-28 DIAGNOSIS — E78.5 HYPERLIPIDEMIA, UNSPECIFIED HYPERLIPIDEMIA TYPE: Primary | ICD-10-CM

## 2021-07-28 DIAGNOSIS — R19.5 DARK STOOLS: ICD-10-CM

## 2021-07-28 DIAGNOSIS — E78.5 HYPERLIPIDEMIA, UNSPECIFIED HYPERLIPIDEMIA TYPE: ICD-10-CM

## 2021-07-28 DIAGNOSIS — R10.9 ABDOMINAL PAIN, UNSPECIFIED ABDOMINAL LOCATION: ICD-10-CM

## 2021-07-28 DIAGNOSIS — R79.89 ELEVATED LIVER FUNCTION TESTS: ICD-10-CM

## 2021-07-28 DIAGNOSIS — K52.9 CHRONIC DIARRHEA: ICD-10-CM

## 2021-07-28 DIAGNOSIS — K22.719 BARRETT'S ESOPHAGUS WITH DYSPLASIA: ICD-10-CM

## 2021-07-28 LAB
25(OH)D3 SERPL-MCNC: 26.1 NG/ML (ref 30–100)
CHOLEST SERPL-MCNC: 220 MG/DL (ref 50–200)
HDLC SERPL-MCNC: 35 MG/DL
NONHDLC SERPL-MCNC: 185 MG/DL
TRIGL SERPL-MCNC: 524 MG/DL

## 2021-07-28 PROCEDURE — 82306 VITAMIN D 25 HYDROXY: CPT

## 2021-07-28 PROCEDURE — 36415 COLL VENOUS BLD VENIPUNCTURE: CPT

## 2021-07-28 PROCEDURE — 80061 LIPID PANEL: CPT

## 2021-07-28 PROCEDURE — 99212 OFFICE O/P EST SF 10 MIN: CPT | Performed by: INTERNAL MEDICINE

## 2021-07-28 RX ORDER — ROSUVASTATIN CALCIUM 20 MG/1
20 TABLET, COATED ORAL DAILY
Qty: 30 TABLET | Refills: 0 | Status: SHIPPED | OUTPATIENT
Start: 2021-07-28 | End: 2021-07-29

## 2021-07-28 NOTE — PROGRESS NOTES
Assessment/Plan:    No problem-specific Assessment & Plan notes found for this encounter  Diagnoses and all orders for this visit:    Hyperlipidemia, unspecified hyperlipidemia type  Comments: Will change atorvastatin 40 mg to rosuvastatin 20 mg due to side effects  Will repeat lipid panel  Orders:  -     rosuvastatin (CRESTOR) 20 MG tablet; Take 1 tablet (20 mg total) by mouth daily    Anxiety  Comments:  Currently well controlled on current treatment  Vitamin D deficiency  Comments:  Repeat vitamin-D level  Chronic diarrhea  Comments: Follow-up with gastroenterology  Dark stools  Comments:  Currently resolved, she he will follow-up with gastroenterology  Subjective:      Patient ID: Charli Degroot is a 23 y o  male  Patient came today for follow-up on his chronic diarrhea, he was recently referred to gastroenterologist for further workup  He also needed a note for his work so he will be excused from it for now  His blood work in the past was positive for low vitamin-D level, his ALT was slightly elevated, his lipid panel was showing significantly increased LDL level  He was on atorvastatin 40 mg daily but he said that he could not tolerate medication because of the headache  He has blood work for lipid panel and vitamin-D that he will repeat today  He also cell count and CMP was ordered for him as he had an episode of dark stools which is currently resolved  He has scheduled appointment with Gastroenterology        The following portions of the patient's history were reviewed and updated as appropriate: allergies, current medications, past family history, past medical history, past social history, past surgical history, and problem list     Review of Systems      Objective:      /88 (BP Location: Left arm, Patient Position: Sitting, Cuff Size: Adult)   Pulse 101   Ht 5' 8" (1 727 m)   Wt 97 1 kg (214 lb)   SpO2 98%   BMI 32 54 kg/m²          Physical Exam Current Outpatient Medications:     azelastine (ASTELIN) 0 1 % nasal spray, 1 spray into each nostril 2 (two) times a day Use in each nostril as directed, Disp: 1 Bottle, Rfl: 2    busPIRone (BUSPAR) 30 MG tablet, TAKE 1 TABLET(30 MG) BY MOUTH TWICE DAILY, Disp: 60 tablet, Rfl: 0    cholecalciferol (VITAMIN D3) 1,000 units tablet, Take 1 tablet (1,000 Units total) by mouth daily Start after done with the high-dose , Disp: 90 tablet, Rfl: 3    fluticasone (FLONASE) 50 mcg/act nasal spray, 2 sprays into each nostril daily, Disp: 1 Bottle, Rfl: 2    lidocaine (LIDODERM) 5 %, Apply 1 patch topically every 24 hours Remove & Discard patch within 12 hours or as directed by MD, Disp: 6 patch, Rfl: 0    meclizine (ANTIVERT) 25 mg tablet, Take 1 tablet (25 mg total) by mouth every 8 (eight) hours, Disp: 15 tablet, Rfl: 0    montelukast (SINGULAIR) 10 mg tablet, Take 1 tablet (10 mg total) by mouth daily at bedtime, Disp: 90 tablet, Rfl: 0    oxymetazoline (Afrin 12 Hour) 0 05 % nasal spray, 2 sprays by Each Nare route 2 (two) times a day, Disp: 30 mL, Rfl: 0    pantoprazole (PROTONIX) 40 mg tablet, Take 1 tablet (40 mg total) by mouth daily, Disp: 90 tablet, Rfl: 3    ergocalciferol (VITAMIN D2) 50,000 units, Take 1 capsule (50,000 Units total) by mouth once a week for 8 doses (Patient not taking: Reported on 5/28/2021), Disp: 8 capsule, Rfl: 0    predniSONE 20 mg tablet, Take 3 pills on day 1, 2 and 3, 2 pills on day 4, 5 and 6, 1 pill on day 7 then stop (Patient not taking: Reported on 7/28/2021), Disp: 16 tablet, Rfl: 0    rosuvastatin (CRESTOR) 20 MG tablet, Take 1 tablet (20 mg total) by mouth daily, Disp: 30 tablet, Rfl: 0 80

## 2021-07-28 NOTE — LETTER
July 28, 2021     Patient: Pamela Richmond   YOB: 1995   Date of Visit: 7/28/2021       To Whom it May Concern:    Rhonda Case is under my professional care  He was seen in my office on 7/28/2021  Patient's sleeve will be extended up to August 10th            Sincerely,          Georgena Frankel, MD        CC: No Recipients

## 2021-07-29 RX ORDER — ROSUVASTATIN CALCIUM 20 MG/1
TABLET, COATED ORAL
Qty: 90 TABLET | Refills: 0 | Status: SHIPPED | OUTPATIENT
Start: 2021-07-29 | End: 2021-08-25

## 2021-08-03 ENCOUNTER — CONSULT (OUTPATIENT)
Dept: GASTROENTEROLOGY | Facility: AMBULARY SURGERY CENTER | Age: 26
End: 2021-08-03
Payer: COMMERCIAL

## 2021-08-03 VITALS
DIASTOLIC BLOOD PRESSURE: 84 MMHG | SYSTOLIC BLOOD PRESSURE: 132 MMHG | BODY MASS INDEX: 32.13 KG/M2 | WEIGHT: 212 LBS | HEIGHT: 68 IN

## 2021-08-03 DIAGNOSIS — R79.89 ELEVATED LIVER FUNCTION TESTS: ICD-10-CM

## 2021-08-03 DIAGNOSIS — K21.00 GASTROESOPHAGEAL REFLUX DISEASE WITH ESOPHAGITIS WITHOUT HEMORRHAGE: ICD-10-CM

## 2021-08-03 DIAGNOSIS — R10.9 ABDOMINAL PAIN, UNSPECIFIED ABDOMINAL LOCATION: ICD-10-CM

## 2021-08-03 DIAGNOSIS — R19.5 LOOSE STOOLS: ICD-10-CM

## 2021-08-03 DIAGNOSIS — R19.5 DARK STOOLS: ICD-10-CM

## 2021-08-03 DIAGNOSIS — K22.719 BARRETT'S ESOPHAGUS WITH DYSPLASIA: Primary | ICD-10-CM

## 2021-08-03 PROCEDURE — 99204 OFFICE O/P NEW MOD 45 MIN: CPT | Performed by: PHYSICIAN ASSISTANT

## 2021-08-03 PROCEDURE — 3008F BODY MASS INDEX DOCD: CPT | Performed by: PHYSICIAN ASSISTANT

## 2021-08-03 PROCEDURE — 1036F TOBACCO NON-USER: CPT | Performed by: PHYSICIAN ASSISTANT

## 2021-08-03 RX ORDER — DIPHENOXYLATE HYDROCHLORIDE AND ATROPINE SULFATE 2.5; .025 MG/1; MG/1
1 TABLET ORAL DAILY
COMMUNITY

## 2021-08-03 NOTE — PROGRESS NOTES
[facility] - Outpatient Consultation  Mike Trotter 23 y o  male MRN: [id number]  Encounter: [id number]          ASSESSMENT AND PLAN:      1  Dyspepsia and postprandial abdominal pain, along with reported history of Oden's esophagus  Also reports chronic loose stools although no recent change in stool frequency, along with steatorrhea  Rule out any dysplasia or malignancy, also any organic etiologies of dyspepsia such as H pylori infection, gastritis, peptic ulcer disease, symptomatic cholelithiasis  Also consider pancreatic insufficiency    - check right upper quadrant ultrasound     -will plan for EGD    -procedure was explained in detail to the patient at this time including associated risks and benefits, risks including but not limited to infection, perforation and bleeding     -continue Protonix once daily in the meantime, advised patient he can try Pepcid in addition to this    - check stool pancreatic elastase, calprotectin as previously ordered      2  Elevated liver function tests  Mildly elevated transaminases that have been noted on his lab work over the last several months, suspect to be related to fatty liver, but he does take medications for hypertriglyceridemia, also rule out viral hepatitis, iron overload syndromes, autoimmune process     -check liver enzymes, CBC, also check iron panel, chronic hepatitis panel, ALEXEI, AMA, ASMA and periodically check liver enzymes     -will also follow-up on right upper quadrant ultrasound as mentioned above    ______________________________________________________________________    HPI:  20-year-old male with history of anxiety, hypertension and hyperlipidemia who presents for evaluation  He says that for the past approximately 3 years he has had issues with lots of bloating in the abdomen, often times flatulence, nausea without vomiting, burning sensation in the chest without dysphagia    He had testing for celiac disease a few months ago that was negative  He takes Gas-X as needed and Protonix once daily  He does endorse fecal urgency, he says his bowel movements vary in consistency from liquid to soft, they are never firm or hard, he denies any rectal bleeding, he said he did have 1 black stool awhile ago but he thinks that was because he took Pepto-Bismol, his stools since then have been brown  He denies any NSAID use  He thinks he was tried on Bentyl in the past for similar symptoms and this did not help, he said he had an EGD and a colonoscopy 2 years ago in [facility] and he says he was told about having Oden's esophagus and normal-appearing colon  Lab work from May of this year did show mildly elevated ALT of 81, other liver enzymes appeared normal   He had reported history of Oden's esophagus and was unsure when his next EGD was due  No known family history of colon cancer  REVIEW OF SYSTEMS:    CONSTITUTIONAL: Denies any fever, chills, rigors, and weight loss  HEENT: No earache or tinnitus  Denies hearing loss or visual disturbances  CARDIOVASCULAR: No chest pain or palpitations  RESPIRATORY: Denies any cough, hemoptysis, shortness of breath or dyspnea on exertion  GASTROINTESTINAL: As noted in the History of Present Illness  GENITOURINARY: No problems with urination  Denies any hematuria or dysuria  NEUROLOGIC: No dizziness or vertigo, denies headaches  MUSCULOSKELETAL: Denies any muscle or joint pain  SKIN: Denies skin rashes or itching  ENDOCRINE: Denies excessive thirst  Denies intolerance to heat or cold  PSYCHOSOCIAL: Denies depression or anxiety  Denies any recent memory loss  Historical Information   Past Medical History:   Diagnosis Date    Anxiety     Hypertension      No past surgical history on file    Social History   Social History     Substance and Sexual Activity   Alcohol Use Yes    Comment: holiday/special occassion     Social History     Substance and Sexual Activity   Drug Use Never     Social History     Tobacco Use   Smoking Status Never Smoker   Smokeless Tobacco Never Used     Family History   Problem Relation Age of Onset    Diabetes Mother     Hypertension Mother     Anxiety disorder Mother     Heart attack Father     Hypertension Father     Anxiety disorder Father        Meds/Allergies       Current Outpatient Medications:     azelastine (ASTELIN) 0 1 % nasal spray    busPIRone (BUSPAR) 30 MG tablet    cholecalciferol (VITAMIN D3) 1,000 units tablet    ergocalciferol (VITAMIN D2) 50,000 units    fluticasone (FLONASE) 50 mcg/act nasal spray    lidocaine (LIDODERM) 5 %    meclizine (ANTIVERT) 25 mg tablet    montelukast (SINGULAIR) 10 mg tablet    oxymetazoline (Afrin 12 Hour) 0 05 % nasal spray    pantoprazole (PROTONIX) 40 mg tablet    predniSONE 20 mg tablet    rosuvastatin (CRESTOR) 20 MG tablet    Allergies   Allergen Reactions    Penicillins Hives           Objective     There were no vitals taken for this visit  There is no height or weight on file to calculate BMI  PHYSICAL EXAM:      General Appearance:   Alert, cooperative, no distress   HEENT:   Normocephalic, atraumatic, anicteric      Neck:  Supple, symmetrical, trachea midline   Lungs:   Clear to auscultation bilaterally; no rales, rhonchi or wheezing; respirations unlabored    Heart[de-identified]   Regular rate and rhythm; no murmur, rub, or gallop  Abdomen:   Soft, non-tender, non-distended; normal bowel sounds; no masses, no organomegaly    Genitalia:   Deferred    Rectal:   Deferred    Extremities:  No cyanosis, clubbing or edema    Pulses:  2+ and symmetric    Skin:  No jaundice, rashes, or lesions    Lymph nodes:  No palpable cervical lymphadenopathy        Lab Results:   No visits with results within 1 Day(s) from this visit     Latest known visit with results is:   Appointment on 07/28/2021   Component Date Value    Cholesterol 07/28/2021 220*    Triglycerides 07/28/2021 524*  HDL, Direct 07/28/2021 35*    LDL Calculated 07/28/2021      Non-HDL-Chol (CHOL-HDL) 07/28/2021 185     Vit D, 25-Hydroxy 07/28/2021 26 1*         Radiology Results:   No results found

## 2021-08-10 ENCOUNTER — APPOINTMENT (OUTPATIENT)
Dept: LAB | Facility: MEDICAL CENTER | Age: 26
End: 2021-08-10
Payer: COMMERCIAL

## 2021-08-10 ENCOUNTER — HOSPITAL ENCOUNTER (OUTPATIENT)
Dept: ULTRASOUND IMAGING | Facility: MEDICAL CENTER | Age: 26
Discharge: HOME | End: 2021-08-10
Payer: COMMERCIAL

## 2021-08-10 DIAGNOSIS — K22.719 BARRETT'S ESOPHAGUS WITH DYSPLASIA: ICD-10-CM

## 2021-08-10 DIAGNOSIS — K52.9 CHRONIC DIARRHEA: ICD-10-CM

## 2021-08-10 DIAGNOSIS — R10.9 ABDOMINAL PAIN, UNSPECIFIED ABDOMINAL LOCATION: ICD-10-CM

## 2021-08-10 DIAGNOSIS — R79.89 ELEVATED LIVER FUNCTION TESTS: ICD-10-CM

## 2021-08-10 LAB
ALBUMIN SERPL BCP-MCNC: 4.1 G/DL (ref 3.5–5)
ALP SERPL-CCNC: 84 U/L (ref 46–116)
ALT SERPL W P-5'-P-CCNC: 100 U/L (ref 12–78)
ANION GAP SERPL CALCULATED.3IONS-SCNC: 5 MMOL/L (ref 4–13)
AST SERPL W P-5'-P-CCNC: 35 U/L (ref 5–45)
BASOPHILS # BLD AUTO: 0.03 THOUSANDS/ΜL (ref 0–0.1)
BASOPHILS NFR BLD AUTO: 1 % (ref 0–1)
BILIRUB SERPL-MCNC: 1.03 MG/DL (ref 0.2–1)
BUN SERPL-MCNC: 15 MG/DL (ref 5–25)
CALCIUM SERPL-MCNC: 9.2 MG/DL (ref 8.3–10.1)
CHLORIDE SERPL-SCNC: 108 MMOL/L (ref 100–108)
CO2 SERPL-SCNC: 26 MMOL/L (ref 21–32)
CREAT SERPL-MCNC: 0.9 MG/DL (ref 0.6–1.3)
EOSINOPHIL # BLD AUTO: 0.1 THOUSAND/ΜL (ref 0–0.61)
EOSINOPHIL NFR BLD AUTO: 2 % (ref 0–6)
ERYTHROCYTE [DISTWIDTH] IN BLOOD BY AUTOMATED COUNT: 12.2 % (ref 11.6–15.1)
GFR SERPL CREATININE-BSD FRML MDRD: 118 ML/MIN/1.73SQ M
GLUCOSE SERPL-MCNC: 114 MG/DL (ref 65–140)
HCT VFR BLD AUTO: 47.9 % (ref 36.5–49.3)
HGB BLD-MCNC: 16.2 G/DL (ref 12–17)
IMM GRANULOCYTES # BLD AUTO: 0.01 THOUSAND/UL (ref 0–0.2)
IMM GRANULOCYTES NFR BLD AUTO: 0 % (ref 0–2)
LYMPHOCYTES # BLD AUTO: 2.91 THOUSANDS/ΜL (ref 0.6–4.47)
LYMPHOCYTES NFR BLD AUTO: 52 % (ref 14–44)
MCH RBC QN AUTO: 31.7 PG (ref 26.8–34.3)
MCHC RBC AUTO-ENTMCNC: 33.8 G/DL (ref 31.4–37.4)
MCV RBC AUTO: 94 FL (ref 82–98)
MONOCYTES # BLD AUTO: 0.49 THOUSAND/ΜL (ref 0.17–1.22)
MONOCYTES NFR BLD AUTO: 9 % (ref 4–12)
NEUTROPHILS # BLD AUTO: 2.02 THOUSANDS/ΜL (ref 1.85–7.62)
NEUTS SEG NFR BLD AUTO: 36 % (ref 43–75)
NRBC BLD AUTO-RTO: 0 /100 WBCS
PLATELET # BLD AUTO: 254 THOUSANDS/UL (ref 149–390)
PMV BLD AUTO: 10.6 FL (ref 8.9–12.7)
POTASSIUM SERPL-SCNC: 4.1 MMOL/L (ref 3.5–5.3)
PROT SERPL-MCNC: 7.2 G/DL (ref 6.4–8.2)
RBC # BLD AUTO: 5.11 MILLION/UL (ref 3.88–5.62)
SODIUM SERPL-SCNC: 139 MMOL/L (ref 136–145)
WBC # BLD AUTO: 5.56 THOUSAND/UL (ref 4.31–10.16)

## 2021-08-10 PROCEDURE — 82656 EL-1 FECAL QUAL/SEMIQ: CPT

## 2021-08-10 PROCEDURE — 76705 ECHO EXAM OF ABDOMEN: CPT

## 2021-08-10 PROCEDURE — 36415 COLL VENOUS BLD VENIPUNCTURE: CPT

## 2021-08-10 PROCEDURE — 85025 COMPLETE CBC W/AUTO DIFF WBC: CPT

## 2021-08-10 PROCEDURE — 83993 ASSAY FOR CALPROTECTIN FECAL: CPT

## 2021-08-10 PROCEDURE — 80053 COMPREHEN METABOLIC PANEL: CPT

## 2021-08-10 NOTE — RESULT ENCOUNTER NOTE
Will send lab results to pt's pcp - dr Alina Bianchi  As he is following with patient    Will also send message to pt thru the portal that his cmp is stable - continues to show slightly elevated bilirubin and elevated ALT (liver enzyme) - both stable over time  I believe you are following with GI dr Geovany Mesa is normal

## 2021-08-13 LAB
CALPROTECTIN STL-MCNT: 30 UG/G (ref 0–120)
ELASTASE PANC STL-MCNT: >500 UG ELAST./G

## 2021-08-25 DIAGNOSIS — F41.9 ANXIETY: ICD-10-CM

## 2021-08-25 DIAGNOSIS — E78.5 HYPERLIPIDEMIA, UNSPECIFIED HYPERLIPIDEMIA TYPE: ICD-10-CM

## 2021-08-25 RX ORDER — BUSPIRONE HYDROCHLORIDE 30 MG/1
TABLET ORAL
Qty: 60 TABLET | Refills: 0 | Status: SHIPPED | OUTPATIENT
Start: 2021-08-25 | End: 2022-01-11 | Stop reason: ALTCHOICE

## 2021-08-25 RX ORDER — ROSUVASTATIN CALCIUM 20 MG/1
TABLET, COATED ORAL
Qty: 90 TABLET | Refills: 0 | Status: SHIPPED | OUTPATIENT
Start: 2021-08-25 | End: 2021-09-20

## 2021-09-20 DIAGNOSIS — E78.5 HYPERLIPIDEMIA, UNSPECIFIED HYPERLIPIDEMIA TYPE: ICD-10-CM

## 2021-09-20 RX ORDER — ROSUVASTATIN CALCIUM 20 MG/1
TABLET, COATED ORAL
Qty: 90 TABLET | Refills: 0 | Status: SHIPPED | OUTPATIENT
Start: 2021-09-20 | End: 2022-01-05 | Stop reason: SDUPTHER

## 2021-09-23 ENCOUNTER — OFFICE VISIT (OUTPATIENT)
Dept: FAMILY MEDICINE CLINIC | Facility: CLINIC | Age: 26
End: 2021-09-23
Payer: COMMERCIAL

## 2021-09-23 VITALS
OXYGEN SATURATION: 99 % | SYSTOLIC BLOOD PRESSURE: 130 MMHG | RESPIRATION RATE: 12 BRPM | WEIGHT: 218.2 LBS | DIASTOLIC BLOOD PRESSURE: 90 MMHG | BODY MASS INDEX: 33.07 KG/M2 | HEART RATE: 79 BPM | HEIGHT: 68 IN

## 2021-09-23 DIAGNOSIS — M54.2 CERVICALGIA: Primary | ICD-10-CM

## 2021-09-23 DIAGNOSIS — G89.29 CHRONIC BILATERAL LOW BACK PAIN WITHOUT SCIATICA: ICD-10-CM

## 2021-09-23 DIAGNOSIS — M54.50 CHRONIC BILATERAL LOW BACK PAIN WITHOUT SCIATICA: ICD-10-CM

## 2021-09-23 PROCEDURE — 3008F BODY MASS INDEX DOCD: CPT | Performed by: PHYSICIAN ASSISTANT

## 2021-09-23 PROCEDURE — 99213 OFFICE O/P EST LOW 20 MIN: CPT | Performed by: INTERNAL MEDICINE

## 2021-09-23 NOTE — LETTER
September 23, 2021     Patient: Sammi Weir   YOB: 1995   Date of Visit: 9/23/2021       To Whom it May Concern:    Mell Casanova is under my professional care  He was seen in my office on 9/23/2021  He will be excused from work up to 12/31/2021  If you have any questions or concerns, please don't hesitate to call           Sincerely,          Belinda Robles MD        CC: No Recipients
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
## 2021-10-08 ENCOUNTER — PATIENT MESSAGE (OUTPATIENT)
Dept: FAMILY MEDICINE CLINIC | Facility: CLINIC | Age: 26
End: 2021-10-08

## 2021-10-15 ENCOUNTER — TELEMEDICINE (OUTPATIENT)
Dept: FAMILY MEDICINE CLINIC | Facility: CLINIC | Age: 26
End: 2021-10-15
Payer: COMMERCIAL

## 2021-10-15 DIAGNOSIS — K52.9 CHRONIC DIARRHEA: Primary | ICD-10-CM

## 2021-10-15 DIAGNOSIS — J30.9 ALLERGIC RHINITIS, UNSPECIFIED SEASONALITY, UNSPECIFIED TRIGGER: ICD-10-CM

## 2021-10-15 PROCEDURE — 99213 OFFICE O/P EST LOW 20 MIN: CPT | Performed by: INTERNAL MEDICINE

## 2021-10-15 PROCEDURE — 1036F TOBACCO NON-USER: CPT | Performed by: INTERNAL MEDICINE

## 2021-10-20 ENCOUNTER — TELEPHONE (OUTPATIENT)
Dept: GASTROENTEROLOGY | Facility: AMBULARY SURGERY CENTER | Age: 26
End: 2021-10-20

## 2021-10-27 ENCOUNTER — HOSPITAL ENCOUNTER (EMERGENCY)
Facility: HOSPITAL | Age: 26
Discharge: HOME | End: 2021-10-27
Attending: EMERGENCY MEDICINE
Payer: COMMERCIAL

## 2021-10-27 VITALS
OXYGEN SATURATION: 94 % | SYSTOLIC BLOOD PRESSURE: 147 MMHG | WEIGHT: 214.73 LBS | HEART RATE: 100 BPM | DIASTOLIC BLOOD PRESSURE: 85 MMHG | BODY MASS INDEX: 32.65 KG/M2 | RESPIRATION RATE: 17 BRPM | TEMPERATURE: 98.4 F

## 2021-10-27 DIAGNOSIS — L03.90 CELLULITIS: Primary | ICD-10-CM

## 2021-10-27 PROCEDURE — 99284 EMERGENCY DEPT VISIT MOD MDM: CPT | Performed by: EMERGENCY MEDICINE

## 2021-10-27 PROCEDURE — 99282 EMERGENCY DEPT VISIT SF MDM: CPT

## 2021-10-27 RX ORDER — SULFAMETHOXAZOLE AND TRIMETHOPRIM 800; 160 MG/1; MG/1
1 TABLET ORAL 2 TIMES DAILY
Qty: 14 TABLET | Refills: 0 | Status: SHIPPED | OUTPATIENT
Start: 2021-10-27 | End: 2021-11-03

## 2021-12-09 ENCOUNTER — TELEPHONE (OUTPATIENT)
Dept: GASTROENTEROLOGY | Facility: AMBULARY SURGERY CENTER | Age: 26
End: 2021-12-09

## 2021-12-13 ENCOUNTER — TELEMEDICINE (OUTPATIENT)
Dept: FAMILY MEDICINE CLINIC | Facility: CLINIC | Age: 26
End: 2021-12-13
Payer: COMMERCIAL

## 2021-12-13 DIAGNOSIS — B34.9 VIRAL INFECTION, UNSPECIFIED: Primary | ICD-10-CM

## 2021-12-13 PROCEDURE — 99213 OFFICE O/P EST LOW 20 MIN: CPT | Performed by: INTERNAL MEDICINE

## 2021-12-14 PROCEDURE — U0003 INFECTIOUS AGENT DETECTION BY NUCLEIC ACID (DNA OR RNA); SEVERE ACUTE RESPIRATORY SYNDROME CORONAVIRUS 2 (SARS-COV-2) (CORONAVIRUS DISEASE [COVID-19]), AMPLIFIED PROBE TECHNIQUE, MAKING USE OF HIGH THROUGHPUT TECHNOLOGIES AS DESCRIBED BY CMS-2020-01-R: HCPCS | Performed by: INTERNAL MEDICINE

## 2021-12-14 PROCEDURE — U0005 INFEC AGEN DETEC AMPLI PROBE: HCPCS | Performed by: INTERNAL MEDICINE

## 2021-12-16 DIAGNOSIS — B34.9 VIRAL INFECTION, UNSPECIFIED: Primary | ICD-10-CM

## 2021-12-16 DIAGNOSIS — B34.9 VIRAL INFECTION, UNSPECIFIED: ICD-10-CM

## 2021-12-16 RX ORDER — BENZONATATE 200 MG/1
200 CAPSULE ORAL 3 TIMES DAILY PRN
Qty: 20 CAPSULE | Refills: 0 | Status: SHIPPED | OUTPATIENT
Start: 2021-12-16 | End: 2021-12-28

## 2021-12-16 RX ORDER — ALBUTEROL SULFATE 90 UG/1
2 AEROSOL, METERED RESPIRATORY (INHALATION) EVERY 6 HOURS PRN
Qty: 18 G | Refills: 0 | Status: SHIPPED | OUTPATIENT
Start: 2021-12-16 | End: 2021-12-16

## 2021-12-16 RX ORDER — ALBUTEROL SULFATE 90 UG/1
AEROSOL, METERED RESPIRATORY (INHALATION)
Qty: 54 G | Refills: 0 | Status: SHIPPED | OUTPATIENT
Start: 2021-12-16 | End: 2021-12-27 | Stop reason: SDUPTHER

## 2021-12-20 DIAGNOSIS — R06.2 WHEEZING: ICD-10-CM

## 2021-12-20 DIAGNOSIS — R06.2 WHEEZING: Primary | ICD-10-CM

## 2021-12-20 RX ORDER — PREDNISONE 20 MG/1
TABLET ORAL
Qty: 10 TABLET | Refills: 0 | Status: SHIPPED | OUTPATIENT
Start: 2021-12-20 | End: 2021-12-20 | Stop reason: SDUPTHER

## 2021-12-20 RX ORDER — PREDNISONE 20 MG/1
TABLET ORAL
Qty: 10 TABLET | Refills: 0 | Status: SHIPPED | OUTPATIENT
Start: 2021-12-20 | End: 2022-01-11 | Stop reason: ALTCHOICE

## 2021-12-27 ENCOUNTER — TELEPHONE (OUTPATIENT)
Dept: FAMILY MEDICINE CLINIC | Facility: CLINIC | Age: 26
End: 2021-12-27

## 2021-12-27 DIAGNOSIS — B34.9 VIRAL INFECTION, UNSPECIFIED: ICD-10-CM

## 2021-12-27 RX ORDER — ALBUTEROL SULFATE 90 UG/1
2 AEROSOL, METERED RESPIRATORY (INHALATION) EVERY 6 HOURS PRN
Qty: 18 G | Refills: 0 | Status: SHIPPED | OUTPATIENT
Start: 2021-12-27 | End: 2021-12-27

## 2021-12-27 RX ORDER — ALBUTEROL SULFATE 90 UG/1
2 AEROSOL, METERED RESPIRATORY (INHALATION) EVERY 6 HOURS PRN
Qty: 18 G | Refills: 0 | Status: SHIPPED | OUTPATIENT
Start: 2021-12-27 | End: 2022-01-03

## 2021-12-27 NOTE — TELEPHONE ENCOUNTER
Pt is requesting different anxiety medication because the one he is on is not working  And also pt came back positive for covid and is having breathing problems he is requesting a nebulizer treatment to be placed for him an send to [address] [place] [address]  Same address as pt girlfriend ashly cabral  Please review

## 2022-01-03 DIAGNOSIS — B34.9 VIRAL INFECTION, UNSPECIFIED: ICD-10-CM

## 2022-01-03 RX ORDER — ALBUTEROL SULFATE 90 UG/1
AEROSOL, METERED RESPIRATORY (INHALATION)
Qty: 25.5 G | Refills: 0 | Status: SHIPPED | OUTPATIENT
Start: 2022-01-03 | End: 2022-02-01 | Stop reason: SDUPTHER

## 2022-01-04 ENCOUNTER — TELEMEDICINE (OUTPATIENT)
Dept: FAMILY MEDICINE CLINIC | Facility: CLINIC | Age: 27
End: 2022-01-04
Payer: COMMERCIAL

## 2022-01-04 DIAGNOSIS — U07.1 COVID-19: Primary | ICD-10-CM

## 2022-01-04 PROCEDURE — 99213 OFFICE O/P EST LOW 20 MIN: CPT | Performed by: INTERNAL MEDICINE

## 2022-01-04 NOTE — PROGRESS NOTES
COVID-19 Outpatient Progress Note    Assessment/Plan:    Problem List Items Addressed This Visit     None      Visit Diagnoses     COVID-19    -  Primary         Disposition:     Patient was recently diagnosed with COVID-19 infection, his test was positive on 12/24/2022  He is not vaccinated  Finished his isolation, he is afebrile, does not report any shortness of breath but still feels very weak and reports some chest tightness  He was emergency room recently as per patient the x-ray was done and it showed pneumonia so he was treated with Z-Freddie and steroids  He is checking his saturation at home and remains to be normal   We discussed with him that he will call me if he will feel that his symptoms will not continue to improve  I have spent 10 minutes directly with the patient  Encounter provider Ginny Estes MD    Provider located at [address] [id number] [phone]    Recent Visits  No visits were found meeting these conditions  Showing recent visits within past 7 days and meeting all other requirements  Today's Visits  Date Type Provider Dept   01/04/22 Iveth Douglas MD [place] [id number] [facility]   Showing today's visits and meeting all other requirements  Future Appointments  No visits were found meeting these conditions  Showing future appointments within next 150 days and meeting all other requirements     This virtual check-in was done via telephone and he agrees to proceed  Patient agrees to participate in a virtual check in via telephone or video visit instead of presenting to the office to address urgent/immediate medical needs  Patient is aware this is a billable service  After connecting through Telephone, the patient was identified by name and date of birth   Elidia Lu was informed that this was a telemedicine visit and that the exam was being conducted confidentially over secure lines  My office door was closed  Elidia Lu acknowledged consent and understanding of privacy and security of the telemedicine visit  I informed the patient that I have reviewed his record in Epic and presented the opportunity for him to ask any questions regarding the visit today  The patient agreed to participate  Subjective:   Elidia Lu is a 23 y o  male who is concerned about COVID-19  Patient's symptoms include fatigue, cough and chest tightness  Patient denies fever, chills, rhinorrhea, sore throat, shortness of breath, nausea, vomiting, diarrhea and myalgias  COVID-19 vaccination status: Not vaccinated    Lab Results   Component Value Date    SARSCOV2 Negative 12/14/2021    SARSCOV2 Not Detected 01/16/2021     Past Medical History:   Diagnosis Date    Anxiety     Hypertension      Past Surgical History:   Procedure Laterality Date    TONSILLECTOMY       Current Outpatient Medications   Medication Sig Dispense Refill    albuterol (PROVENTIL HFA,VENTOLIN HFA) 90 mcg/act inhaler INHALE 2 PUFFS BY MOUTH EVERY 6 HOURS AS NEEDED 25 5 g 0    benzonatate (TESSALON) 200 MG capsule Take 1 capsule (200 mg total) by mouth 3 (three) times a day as needed for cough 20 capsule 0    busPIRone (BUSPAR) 30 MG tablet TAKE 1 TABLET(30 MG) BY MOUTH TWICE DAILY 60 tablet 0    cholecalciferol (VITAMIN D3) 1,000 units tablet Take 1 tablet (1,000 Units total) by mouth daily Start after done with the high-dose  90 tablet 3    meclizine (ANTIVERT) 25 mg tablet Take 1 tablet (25 mg total) by mouth every 8 (eight) hours 15 tablet 0    montelukast (SINGULAIR) 10 mg tablet Take 1 tablet (10 mg total) by mouth daily at bedtime 90 tablet 0    multivitamin (THERAGRAN) TABS Take 1 tablet by mouth daily      pantoprazole (PROTONIX) 40 mg tablet Take 1 tablet (40 mg total) by mouth daily 90 tablet 3    predniSONE 20 mg tablet Please take 2 pills for 5 days   10 tablet 0    rosuvastatin (CRESTOR) 20 MG tablet TAKE 1 TABLET(20 MG) BY MOUTH DAILY 90 tablet 0     No current facility-administered medications for this visit  Allergies   Allergen Reactions    Penicillins Hives       Review of Systems   Constitutional: Positive for fatigue  Negative for appetite change, chills and fever  HENT: Negative for rhinorrhea, sinus pain and sore throat  Respiratory: Positive for cough and chest tightness  Negative for shortness of breath  Gastrointestinal: Negative for diarrhea, nausea and vomiting  Musculoskeletal: Negative for arthralgias and myalgias  Objective: There were no vitals filed for this visit  Physical Exam  Neurological:      Mental Status: He is alert  VIRTUAL VISIT DISCLAIMER    Jaylyn Berkowitz verbally agrees to participate in [employer]  Pt is aware that [employer] could be limited without vital signs or the ability to perform a full hands-on physical exam  Bill Zimmerman Speaks understands he or the provider may request at any time to terminate the video visit and request the patient to seek care or treatment in person

## 2022-01-04 NOTE — LETTER
January 4, 2022     Patient: Lizz Watkins   YOB: 1995   Date of Visit: 1/4/2022       To Whom it May Concern:    Brian Gallego is under my professional care  He was seen  on 1/4/2022  It is okay for him to work from home until 01/17/2022  He may go back to the office on 01/18/2022  He If you have any questions or concerns, please don't hesitate to call           Sincerely,          Cheri Craig MD        CC: No Recipients

## 2022-01-05 DIAGNOSIS — E78.5 HYPERLIPIDEMIA, UNSPECIFIED HYPERLIPIDEMIA TYPE: ICD-10-CM

## 2022-01-05 RX ORDER — ROSUVASTATIN CALCIUM 20 MG/1
20 TABLET, COATED ORAL DAILY
Qty: 90 TABLET | Refills: 0 | Status: SHIPPED | OUTPATIENT
Start: 2022-01-05 | End: 2022-04-07

## 2022-01-11 ENCOUNTER — OFFICE VISIT (OUTPATIENT)
Dept: FAMILY MEDICINE CLINIC | Facility: CLINIC | Age: 27
End: 2022-01-11
Payer: COMMERCIAL

## 2022-01-11 VITALS
OXYGEN SATURATION: 99 % | BODY MASS INDEX: 32.43 KG/M2 | WEIGHT: 214 LBS | HEIGHT: 68 IN | SYSTOLIC BLOOD PRESSURE: 130 MMHG | DIASTOLIC BLOOD PRESSURE: 90 MMHG | RESPIRATION RATE: 12 BRPM | HEART RATE: 90 BPM | TEMPERATURE: 97.9 F

## 2022-01-11 DIAGNOSIS — Z86.16 HISTORY OF COVID-19: Primary | ICD-10-CM

## 2022-01-11 DIAGNOSIS — R06.02 SOB (SHORTNESS OF BREATH): ICD-10-CM

## 2022-01-11 DIAGNOSIS — F41.9 ANXIETY: ICD-10-CM

## 2022-01-11 PROCEDURE — 3008F BODY MASS INDEX DOCD: CPT | Performed by: INTERNAL MEDICINE

## 2022-01-11 PROCEDURE — 99214 OFFICE O/P EST MOD 30 MIN: CPT | Performed by: INTERNAL MEDICINE

## 2022-01-11 PROCEDURE — 1036F TOBACCO NON-USER: CPT | Performed by: INTERNAL MEDICINE

## 2022-01-11 RX ORDER — TRAZODONE HYDROCHLORIDE 50 MG/1
50 TABLET ORAL
Qty: 30 TABLET | Refills: 0 | Status: SHIPPED | OUTPATIENT
Start: 2022-01-11 | End: 2022-02-01 | Stop reason: ALTCHOICE

## 2022-01-11 NOTE — PROGRESS NOTES
Assessment/Plan:    No problem-specific Assessment & Plan notes found for this encounter  Diagnoses and all orders for this visit:    History of COVID-19  Comments:  Overall symptoms are getting better  Afebrile, normal saturation  Continue symptomatic management  Anxiety  Comments:  Discontinue BuSpar due to side effects, will try patient on trazodone 50 mg  He will let me know in few weeks on his progress  Orders:  -     traZODone (DESYREL) 50 mg tablet; Take 1 tablet (50 mg total) by mouth daily at bedtime    SOB (shortness of breath)  Comments:  Likely due to recent COVID-19 infection, overall improving  Continue albuterol as needed  Subjective:      Patient ID: Edgardo Banks is a 23 y o  male  Patient came today for follow-up after his recent hospital visit in [facility] due to COVID-19 pneumonia  He reports that he still has some shortness of breath but overall it is getting slightly better  His vital signs are normal, oxygen saturation is good  Finished Z-Freddie with prednisone taper  He also reported that he got better with BuSpar that was tried for his anxiety but he developed side effects and then he stop to take it  The following portions of the patient's history were reviewed and updated as appropriate: allergies, current medications, past family history, past medical history, past social history, past surgical history, and problem list     Review of Systems   Constitutional: Negative for chills and fever  Respiratory: Positive for shortness of breath  Negative for wheezing  Cardiovascular: Negative for chest pain and palpitations  Psychiatric/Behavioral: Negative for confusion  The patient is not nervous/anxious            Objective:      /90 (BP Location: Left arm, Patient Position: Sitting, Cuff Size: Standard)   Pulse 90   Temp 97 9 °F (36 6 °C)   Resp 12   Ht 5' 8" (1 727 m)   Wt 97 1 kg (214 lb)   SpO2 99%   BMI 32 54 kg/m²          Physical Exam  Vitals reviewed  Constitutional:       General: He is not in acute distress  Appearance: He is not toxic-appearing  HENT:      Head: Normocephalic  Cardiovascular:      Rate and Rhythm: Normal rate  Pulses: Normal pulses  Pulmonary:      Effort: Pulmonary effort is normal       Breath sounds: Examination of the right-lower field reveals decreased breath sounds  Decreased breath sounds present  Skin:     General: Skin is warm  Neurological:      General: No focal deficit present  Mental Status: He is alert     Psychiatric:         Mood and Affect: Mood normal          Behavior: Behavior normal                Current Outpatient Medications:     albuterol (PROVENTIL HFA,VENTOLIN HFA) 90 mcg/act inhaler, INHALE 2 PUFFS BY MOUTH EVERY 6 HOURS AS NEEDED, Disp: 25 5 g, Rfl: 0    benzonatate (TESSALON) 200 MG capsule, Take 1 capsule (200 mg total) by mouth 3 (three) times a day as needed for cough, Disp: 20 capsule, Rfl: 0    meclizine (ANTIVERT) 25 mg tablet, Take 1 tablet (25 mg total) by mouth every 8 (eight) hours, Disp: 15 tablet, Rfl: 0    montelukast (SINGULAIR) 10 mg tablet, Take 1 tablet (10 mg total) by mouth daily at bedtime, Disp: 90 tablet, Rfl: 0    multivitamin (THERAGRAN) TABS, Take 1 tablet by mouth daily, Disp: , Rfl:     pantoprazole (PROTONIX) 40 mg tablet, Take 1 tablet (40 mg total) by mouth daily, Disp: 90 tablet, Rfl: 3    rosuvastatin (CRESTOR) 20 MG tablet, Take 1 tablet (20 mg total) by mouth daily, Disp: 90 tablet, Rfl: 0    cholecalciferol (VITAMIN D3) 1,000 units tablet, Take 1 tablet (1,000 Units total) by mouth daily Start after done with the high-dose , Disp: 90 tablet, Rfl: 3    traZODone (DESYREL) 50 mg tablet, Take 1 tablet (50 mg total) by mouth daily at bedtime, Disp: 30 tablet, Rfl: 0

## 2022-01-11 NOTE — LETTER
January 11, 2022     Patient: Xavi Orozco   YOB: 1995   Date of Visit: 1/11/2022       To Whom it May Concern:    vaughn Beavert is under my professional care  He was seen in my office on 1/11/2022  He can go back to work on 01/12/2022  If you have any questions or concerns, please don't hesitate to call           Sincerely,          Scott Meigs, MD        CC: No Recipients

## 2022-02-01 ENCOUNTER — APPOINTMENT (OUTPATIENT)
Dept: RADIOLOGY | Facility: MEDICAL CENTER | Age: 27
End: 2022-02-01
Payer: COMMERCIAL

## 2022-02-01 ENCOUNTER — TELEPHONE (OUTPATIENT)
Dept: FAMILY MEDICINE CLINIC | Facility: CLINIC | Age: 27
End: 2022-02-01

## 2022-02-01 ENCOUNTER — OFFICE VISIT (OUTPATIENT)
Dept: FAMILY MEDICINE CLINIC | Facility: CLINIC | Age: 27
End: 2022-02-01
Payer: COMMERCIAL

## 2022-02-01 VITALS
TEMPERATURE: 98.9 F | WEIGHT: 226 LBS | OXYGEN SATURATION: 98 % | HEIGHT: 68 IN | DIASTOLIC BLOOD PRESSURE: 80 MMHG | RESPIRATION RATE: 12 BRPM | BODY MASS INDEX: 34.25 KG/M2 | SYSTOLIC BLOOD PRESSURE: 140 MMHG | HEART RATE: 94 BPM

## 2022-02-01 DIAGNOSIS — L74.3 MILIARIA: ICD-10-CM

## 2022-02-01 DIAGNOSIS — F51.01 PRIMARY INSOMNIA: ICD-10-CM

## 2022-02-01 DIAGNOSIS — R05.9 COUGH: Primary | ICD-10-CM

## 2022-02-01 DIAGNOSIS — B34.9 VIRAL INFECTION, UNSPECIFIED: ICD-10-CM

## 2022-02-01 DIAGNOSIS — R05.9 COUGH: ICD-10-CM

## 2022-02-01 PROCEDURE — 99214 OFFICE O/P EST MOD 30 MIN: CPT | Performed by: INTERNAL MEDICINE

## 2022-02-01 PROCEDURE — 1036F TOBACCO NON-USER: CPT | Performed by: INTERNAL MEDICINE

## 2022-02-01 PROCEDURE — 3008F BODY MASS INDEX DOCD: CPT | Performed by: INTERNAL MEDICINE

## 2022-02-01 PROCEDURE — 71046 X-RAY EXAM CHEST 2 VIEWS: CPT

## 2022-02-01 RX ORDER — ALBUTEROL SULFATE 90 UG/1
2 AEROSOL, METERED RESPIRATORY (INHALATION) EVERY 6 HOURS PRN
Qty: 25.5 G | Refills: 0 | Status: SHIPPED | OUTPATIENT
Start: 2022-02-01 | End: 2022-04-14

## 2022-02-01 RX ORDER — TRAZODONE HYDROCHLORIDE 100 MG/1
100 TABLET ORAL
Qty: 90 TABLET | Refills: 1 | Status: SHIPPED | OUTPATIENT
Start: 2022-02-01 | End: 2022-05-08

## 2022-02-01 RX ORDER — BUDESONIDE 180 UG/1
2 AEROSOL, POWDER RESPIRATORY (INHALATION) 2 TIMES DAILY
Qty: 1 EACH | Refills: 0 | Status: SHIPPED | OUTPATIENT
Start: 2022-02-01 | End: 2022-02-28

## 2022-02-01 NOTE — TELEPHONE ENCOUNTER
Patient called and informed that Pulmicort Flexhaler is to complicated to use and he can't use 2 x day  Patient requesting to Rx for something else  Please advised

## 2022-02-01 NOTE — PROGRESS NOTES
Assessment/Plan:    No problem-specific Assessment & Plan notes found for this encounter  Diagnoses and all orders for this visit:    Cough  Comments:  Chronic cough after recent COVID-19 infection  Will use Pulmicort  Will repeat x-ray  Orders:  -     budesonide (Pulmicort Flexhaler) 180 MCG/ACT inhaler; Inhale 2 puffs 2 (two) times a day Rinse mouth after use  -     XR chest pa & lateral; Future    Viral infection, unspecified  -     albuterol (PROVENTIL HFA,VENTOLIN HFA) 90 mcg/act inhaler; Inhale 2 puffs every 6 (six) hours as needed for wheezing    Primary insomnia  Comments:  Better with trazodone, increase the dose up to 100 mg daily  Orders:  -     traZODone (DESYREL) 100 mg tablet; Take 1 tablet (100 mg total) by mouth daily at bedtime    [place]  Comments: Instructed to keep area dry and clean  Will watch for now  Subjective:      Patient ID: Jm Odom is a 23 y o  male  Patient came today with complains of continuation of cough and shortness of breath after his recent COVID-19 infection  He was diagnosed with COVID-19 pneumonia in [place]  He also reported that he is feeling better in terms of his insomnia been on trazodone but he feels that the dose should be slightly higher  He also complained of a rash on his back  The following portions of the patient's history were reviewed and updated as appropriate: allergies, current medications, past family history, past medical history, past social history, past surgical history, and problem list     Review of Systems   Constitutional: Negative for chills and fever  Respiratory: Positive for cough and shortness of breath  Negative for wheezing  Cardiovascular: Negative for chest pain and palpitations  Skin: Positive for rash  Psychiatric/Behavioral: Negative for confusion  The patient is not nervous/anxious            Objective:      /80 (BP Location: Left arm, Patient Position: Sitting, Cuff Size: Standard) Pulse 94   Temp 98 9 °F (37 2 °C)   Resp 12   Ht 5' 8" (1 727 m)   Wt 103 kg (226 lb)   SpO2 98%   BMI 34 36 kg/m²     Allergies   Allergen Reactions    Penicillins Hives          Current Outpatient Medications:     albuterol (PROVENTIL HFA,VENTOLIN HFA) 90 mcg/act inhaler, Inhale 2 puffs every 6 (six) hours as needed for wheezing, Disp: 25 5 g, Rfl: 0    benzonatate (TESSALON) 200 MG capsule, Take 1 capsule (200 mg total) by mouth 3 (three) times a day as needed for cough, Disp: 20 capsule, Rfl: 0    meclizine (ANTIVERT) 25 mg tablet, Take 1 tablet (25 mg total) by mouth every 8 (eight) hours, Disp: 15 tablet, Rfl: 0    montelukast (SINGULAIR) 10 mg tablet, Take 1 tablet (10 mg total) by mouth daily at bedtime, Disp: 90 tablet, Rfl: 0    multivitamin (THERAGRAN) TABS, Take 1 tablet by mouth daily, Disp: , Rfl:     pantoprazole (PROTONIX) 40 mg tablet, Take 1 tablet (40 mg total) by mouth daily, Disp: 90 tablet, Rfl: 3    rosuvastatin (CRESTOR) 20 MG tablet, Take 1 tablet (20 mg total) by mouth daily, Disp: 90 tablet, Rfl: 0    budesonide (Pulmicort Flexhaler) 180 MCG/ACT inhaler, Inhale 2 puffs 2 (two) times a day Rinse mouth after use , Disp: 1 each, Rfl: 0    cholecalciferol (VITAMIN D3) 1,000 units tablet, Take 1 tablet (1,000 Units total) by mouth daily Start after done with the high-dose , Disp: 90 tablet, Rfl: 3    traZODone (DESYREL) 100 mg tablet, Take 1 tablet (100 mg total) by mouth daily at bedtime, Disp: 90 tablet, Rfl: 1     There are no Patient Instructions on file for this visit  Physical Exam  Constitutional:       General: He is not in acute distress  Appearance: He is not ill-appearing or toxic-appearing  Cardiovascular:      Rate and Rhythm: Normal rate  Pulses: Normal pulses  Heart sounds: No murmur heard  Pulmonary:      Effort: No respiratory distress  Breath sounds: Rales present  No wheezing  Skin:     Findings: Rash present

## 2022-02-02 ENCOUNTER — TELEPHONE (OUTPATIENT)
Dept: GASTROENTEROLOGY | Facility: HOSPITAL | Age: 27
End: 2022-02-02

## 2022-02-20 DIAGNOSIS — K21.00 GASTROESOPHAGEAL REFLUX DISEASE WITH ESOPHAGITIS WITHOUT HEMORRHAGE: ICD-10-CM

## 2022-02-20 DIAGNOSIS — K22.719 BARRETT'S ESOPHAGUS WITH DYSPLASIA: ICD-10-CM

## 2022-02-21 RX ORDER — PANTOPRAZOLE SODIUM 40 MG/1
TABLET, DELAYED RELEASE ORAL
Qty: 90 TABLET | Refills: 3 | Status: SHIPPED | OUTPATIENT
Start: 2022-02-21

## 2022-02-28 DIAGNOSIS — R05.9 COUGH: ICD-10-CM

## 2022-02-28 RX ORDER — BUDESONIDE 180 UG/1
AEROSOL, POWDER RESPIRATORY (INHALATION)
Qty: 1 EACH | Refills: 0 | Status: SHIPPED | OUTPATIENT
Start: 2022-02-28

## 2022-03-02 DIAGNOSIS — R05.9 COUGH: ICD-10-CM

## 2022-03-02 RX ORDER — BUDESONIDE 180 UG/1
2 AEROSOL, POWDER RESPIRATORY (INHALATION) 2 TIMES DAILY
Qty: 1 EACH | Refills: 0 | OUTPATIENT
Start: 2022-03-02

## 2022-04-07 DIAGNOSIS — E78.5 HYPERLIPIDEMIA, UNSPECIFIED HYPERLIPIDEMIA TYPE: ICD-10-CM

## 2022-04-07 RX ORDER — ROSUVASTATIN CALCIUM 20 MG/1
TABLET, COATED ORAL
Qty: 90 TABLET | Refills: 0 | Status: SHIPPED | OUTPATIENT
Start: 2022-04-07 | End: 2022-04-15

## 2022-04-14 DIAGNOSIS — B34.9 VIRAL INFECTION, UNSPECIFIED: ICD-10-CM

## 2022-04-14 RX ORDER — ALBUTEROL SULFATE 90 UG/1
AEROSOL, METERED RESPIRATORY (INHALATION)
Qty: 25.5 G | Refills: 0 | Status: SHIPPED | OUTPATIENT
Start: 2022-04-14

## 2022-04-15 DIAGNOSIS — E78.5 HYPERLIPIDEMIA, UNSPECIFIED HYPERLIPIDEMIA TYPE: ICD-10-CM

## 2022-04-15 RX ORDER — ROSUVASTATIN CALCIUM 20 MG/1
TABLET, COATED ORAL
Qty: 90 TABLET | Refills: 0 | Status: SHIPPED | OUTPATIENT
Start: 2022-04-15 | End: 2022-07-27 | Stop reason: SDUPTHER

## 2022-05-08 DIAGNOSIS — F51.01 PRIMARY INSOMNIA: ICD-10-CM

## 2022-05-08 RX ORDER — TRAZODONE HYDROCHLORIDE 100 MG/1
TABLET ORAL
Qty: 90 TABLET | Refills: 1 | Status: SHIPPED | OUTPATIENT
Start: 2022-05-08 | End: 2022-07-15 | Stop reason: SDUPTHER

## 2022-07-15 ENCOUNTER — OFFICE VISIT (OUTPATIENT)
Dept: FAMILY MEDICINE CLINIC | Facility: CLINIC | Age: 27
End: 2022-07-15
Payer: COMMERCIAL

## 2022-07-15 VITALS
BODY MASS INDEX: 33.95 KG/M2 | WEIGHT: 224 LBS | DIASTOLIC BLOOD PRESSURE: 70 MMHG | SYSTOLIC BLOOD PRESSURE: 140 MMHG | OXYGEN SATURATION: 98 % | HEART RATE: 84 BPM | HEIGHT: 68 IN | RESPIRATION RATE: 12 BRPM

## 2022-07-15 DIAGNOSIS — E78.2 MIXED HYPERLIPIDEMIA: ICD-10-CM

## 2022-07-15 DIAGNOSIS — R19.5 LOOSE STOOLS: Primary | ICD-10-CM

## 2022-07-15 DIAGNOSIS — K21.00 GASTROESOPHAGEAL REFLUX DISEASE WITH ESOPHAGITIS WITHOUT HEMORRHAGE: ICD-10-CM

## 2022-07-15 DIAGNOSIS — F51.01 PRIMARY INSOMNIA: ICD-10-CM

## 2022-07-15 DIAGNOSIS — R19.7 FREQUENT DIARRHEA: ICD-10-CM

## 2022-07-15 DIAGNOSIS — E66.9 OBESITY (BMI 30-39.9): ICD-10-CM

## 2022-07-15 PROCEDURE — 99214 OFFICE O/P EST MOD 30 MIN: CPT | Performed by: INTERNAL MEDICINE

## 2022-07-15 RX ORDER — LOPERAMIDE HYDROCHLORIDE 2 MG/1
2 CAPSULE ORAL 4 TIMES DAILY PRN
Qty: 90 CAPSULE | Refills: 0 | Status: SHIPPED | OUTPATIENT
Start: 2022-07-15

## 2022-07-15 RX ORDER — TRAZODONE HYDROCHLORIDE 100 MG/1
200 TABLET ORAL
Qty: 90 TABLET | Refills: 1 | Status: SHIPPED | OUTPATIENT
Start: 2022-07-15 | End: 2022-10-28

## 2022-07-15 NOTE — ASSESSMENT & PLAN NOTE
He is not losing weight  No systemic symptoms  Does not report any abdominal pain, nausea vomiting  Ill try to use Imodium p r n  He will also follow-up with Gastroenterology again

## 2022-07-15 NOTE — PROGRESS NOTES
Assessment/Plan:    Loose stools  He is not losing weight  No systemic symptoms  Does not report any abdominal pain, nausea vomiting  Ill try to use Imodium p r n  He will also follow-up with Gastroenterology again  Mixed hyperlipidemia  He is on Crestor 20 mg daily  Will repeat his lipid panel  Gastroesophageal reflux disease with esophagitis without hemorrhage  Seems to be controlled on pantoprazole  Primary insomnia  Looks like trazodone is effective but he feels that it does not work to the full extent  Increase trazodone up to 200 mg daily at night  Diagnoses and all orders for this visit:    Loose stools    Primary insomnia  Comments:  Better with trazodone, increase the dose up to 100 mg daily  Orders:  -     traZODone (DESYREL) 100 mg tablet; Take 2 tablets (200 mg total) by mouth daily at bedtime    Obesity (BMI 30-39 9)  -     Comprehensive metabolic panel; Future  -     TSH, 3rd generation with Free T4 reflex; Future  -     CBC and differential; Future  -     Lipid panel; Future  -     Vitamin D 25 hydroxy; Future  -     UA (URINE) with reflex to Scope  -     HEMOGLOBIN A1C W/ EAG ESTIMATION; Future    Frequent diarrhea  -     Ambulatory Referral to Gastroenterology; Future  -     loperamide (IMODIUM) 2 mg capsule; Take 1 capsule (2 mg total) by mouth 4 (four) times a day as needed for diarrhea    Mixed hyperlipidemia    Gastroesophageal reflux disease with esophagitis without hemorrhage          Subjective:      Patient ID: Gen Mann is a 33 y o  male  Patient came today for follow-up on his chronic problems and with the new complaint that he has  He reports frequent episodes of loose stools, sometimes 3 times a day  He followed up with Gastroenterology in the past   He also brought FMLA forms for me to fill out        The following portions of the patient's history were reviewed and updated as appropriate: allergies, current medications, past family history, past medical history, past social history, past surgical history, and problem list     Review of Systems   Constitutional: Negative for chills, fatigue and fever  Respiratory: Negative for cough, chest tightness and shortness of breath  Cardiovascular: Negative for chest pain, palpitations and leg swelling  Gastrointestinal: Positive for diarrhea  Negative for abdominal distention, abdominal pain, blood in stool, constipation and nausea  Genitourinary: Negative for difficulty urinating and dysuria  Musculoskeletal: Negative for arthralgias, back pain, gait problem, joint swelling, myalgias and neck pain  Skin: Negative for rash  Neurological: Negative for dizziness, weakness, numbness and headaches  Psychiatric/Behavioral: Negative for agitation           Objective:      /70 (BP Location: Left arm, Patient Position: Sitting, Cuff Size: Standard)   Pulse 84   Resp 12   Ht 5' 8" (1 727 m)   Wt 102 kg (224 lb)   SpO2 98%   BMI 34 06 kg/m²     Allergies   Allergen Reactions    Penicillins Hives          Current Outpatient Medications:     albuterol (PROVENTIL HFA,VENTOLIN HFA) 90 mcg/act inhaler, INHALE 2 PUFFS BY MOUTH EVERY 6 HOURS AS NEEDED FOR WHEEZING, Disp: 25 5 g, Rfl: 0    loperamide (IMODIUM) 2 mg capsule, Take 1 capsule (2 mg total) by mouth 4 (four) times a day as needed for diarrhea, Disp: 90 capsule, Rfl: 0    multivitamin (THERAGRAN) TABS, Take 1 tablet by mouth daily, Disp: , Rfl:     pantoprazole (PROTONIX) 40 mg tablet, TAKE 1 TABLET(40 MG) BY MOUTH DAILY, Disp: 90 tablet, Rfl: 3    rosuvastatin (CRESTOR) 20 MG tablet, TAKE 1 TABLET(20 MG) BY MOUTH DAILY, Disp: 90 tablet, Rfl: 0    traZODone (DESYREL) 100 mg tablet, Take 2 tablets (200 mg total) by mouth daily at bedtime, Disp: 90 tablet, Rfl: 1    benzonatate (TESSALON) 200 MG capsule, Take 1 capsule (200 mg total) by mouth 3 (three) times a day as needed for cough (Patient not taking: Reported on 7/15/2022), Disp: 20 capsule, Rfl: 0    cholecalciferol (VITAMIN D3) 1,000 units tablet, Take 1 tablet (1,000 Units total) by mouth daily Start after done with the high-dose , Disp: 90 tablet, Rfl: 3    meclizine (ANTIVERT) 25 mg tablet, Take 1 tablet (25 mg total) by mouth every 8 (eight) hours (Patient not taking: Reported on 7/15/2022), Disp: 15 tablet, Rfl: 0    montelukast (SINGULAIR) 10 mg tablet, Take 1 tablet (10 mg total) by mouth daily at bedtime (Patient not taking: Reported on 7/15/2022), Disp: 90 tablet, Rfl: 0    Pulmicort Flexhaler 180 MCG/ACT inhaler, INHALE 2 PUFFS BY MOUTH TWICE DAILY  RINSE MOUTH AFTER USE (Patient not taking: Reported on 7/15/2022), Disp: 1 each, Rfl: 0     There are no Patient Instructions on file for this visit  Physical Exam  Vitals reviewed  Constitutional:       General: He is not in acute distress  Appearance: He is not toxic-appearing  HENT:      Head: Normocephalic  Cardiovascular:      Rate and Rhythm: Normal rate  Pulses: Normal pulses  Pulmonary:      Effort: Pulmonary effort is normal    Abdominal:      General: There is no distension  Skin:     General: Skin is warm  Neurological:      General: No focal deficit present  Mental Status: He is alert     Psychiatric:         Mood and Affect: Mood normal          Behavior: Behavior normal

## 2022-07-15 NOTE — ASSESSMENT & PLAN NOTE
Looks like trazodone is effective but he feels that it does not work to the full extent  Increase trazodone up to 200 mg daily at night

## 2022-07-20 ENCOUNTER — TELEPHONE (OUTPATIENT)
Dept: FAMILY MEDICINE CLINIC | Facility: CLINIC | Age: 27
End: 2022-07-20

## 2022-07-20 NOTE — TELEPHONE ENCOUNTER
Please contact patient to confirm health insurance  Bautista Betancur is coming back as not eligible, and while there is a COLTON Marquez on file, it is not listed in his account  Is this the correct card/coverage, or is there another coverage at this time?  Please verify and update

## 2022-07-27 DIAGNOSIS — E78.5 HYPERLIPIDEMIA, UNSPECIFIED HYPERLIPIDEMIA TYPE: ICD-10-CM

## 2022-07-28 RX ORDER — ROSUVASTATIN CALCIUM 20 MG/1
20 TABLET, COATED ORAL DAILY
Qty: 90 TABLET | Refills: 2 | Status: SHIPPED | OUTPATIENT
Start: 2022-07-28

## 2022-08-04 ENCOUNTER — TELEPHONE (OUTPATIENT)
Dept: FAMILY MEDICINE CLINIC | Facility: CLINIC | Age: 27
End: 2022-08-04

## 2022-08-04 NOTE — TELEPHONE ENCOUNTER
Formed that was filled out , was not precise to why the patient need to have the requested accommodations for work      Please contact PT to see what exactly needs to be on the form and re-fax

## 2022-08-31 ENCOUNTER — TELEMEDICINE (OUTPATIENT)
Dept: FAMILY MEDICINE CLINIC | Facility: CLINIC | Age: 27
End: 2022-08-31
Payer: COMMERCIAL

## 2022-08-31 DIAGNOSIS — R19.7 DIARRHEA, UNSPECIFIED TYPE: ICD-10-CM

## 2022-08-31 DIAGNOSIS — J30.9 ALLERGIC RHINITIS, UNSPECIFIED SEASONALITY, UNSPECIFIED TRIGGER: ICD-10-CM

## 2022-08-31 DIAGNOSIS — R10.9 ABDOMINAL CRAMPS: Primary | ICD-10-CM

## 2022-08-31 PROCEDURE — 99213 OFFICE O/P EST LOW 20 MIN: CPT | Performed by: INTERNAL MEDICINE

## 2022-08-31 RX ORDER — MONTELUKAST SODIUM 10 MG/1
10 TABLET ORAL
Qty: 90 TABLET | Refills: 0 | Status: SHIPPED | OUTPATIENT
Start: 2022-08-31

## 2022-09-01 PROBLEM — R10.9 ABDOMINAL CRAMPS: Status: ACTIVE | Noted: 2022-09-01

## 2022-09-01 NOTE — PROGRESS NOTES
Virtual Regular Visit    Verification of patient location:    Patient is located in the following state in which I hold an active license [place]      Assessment/Plan:    Problem List Items Addressed This Visit        Respiratory    Allergic rhinitis    Relevant Medications    montelukast (SINGULAIR) 10 mg tablet       Other    Abdominal cramps - Primary      Other Visit Diagnoses     Diarrhea, unspecified type                   Reason for visit is   Chief Complaint   Patient presents with    Letter for School/Work    Virtual Regular Visit        Encounter provider Bette Avila MD    Provider located at [address] [place] [address]  [phone]      Recent Visits  Date Type Provider Dept   08/31/22 [address] Romi Rodriguez MD [place] [address] Primary Care   Showing recent visits within past 7 days and meeting all other requirements  Future Appointments  No visits were found meeting these conditions  Showing future appointments within next 150 days and meeting all other requirements       The patient was identified by name and date of birth  Eliezer Villalobos was informed that this is a telemedicine visit and that the visit is being conducted through [address] and patient was informed this is a secure, HIPAA-complaint platform  He agrees to proceed     My office door was closed  No one else was in the room  He acknowledged consent and understanding of privacy and security of the video platform  The patient has agreed to participate and understands they can discontinue the visit at any time  Patient is aware this is a billable service  Subjective  Eliezer Villalobos is a 33 y o  male   Patient called today to work on his forms for his job due to frequent episodes of bloating and abdominal pain  He said that he will not be able to work productively unless he will get more on scheduled breaks    He has follow-up with gastroenterology service for that  He also reported that his asthma recently got slightly worse, he was on Singulair in the past with good response  Prescription was sent  Past Medical History:   Diagnosis Date    Anxiety     Hypertension        Past Surgical History:   Procedure Laterality Date    TONSILLECTOMY         Current Outpatient Medications   Medication Sig Dispense Refill    albuterol (PROVENTIL HFA,VENTOLIN HFA) 90 mcg/act inhaler INHALE 2 PUFFS BY MOUTH EVERY 6 HOURS AS NEEDED FOR WHEEZING 25 5 g 0    loperamide (IMODIUM) 2 mg capsule Take 1 capsule (2 mg total) by mouth 4 (four) times a day as needed for diarrhea 90 capsule 0    montelukast (SINGULAIR) 10 mg tablet Take 1 tablet (10 mg total) by mouth daily at bedtime 90 tablet 0    multivitamin (THERAGRAN) TABS Take 1 tablet by mouth daily      pantoprazole (PROTONIX) 40 mg tablet TAKE 1 TABLET(40 MG) BY MOUTH DAILY 90 tablet 3    rosuvastatin (CRESTOR) 20 MG tablet Take 1 tablet (20 mg total) by mouth daily 90 tablet 2    traZODone (DESYREL) 100 mg tablet Take 2 tablets (200 mg total) by mouth daily at bedtime 90 tablet 1    benzonatate (TESSALON) 200 MG capsule Take 1 capsule (200 mg total) by mouth 3 (three) times a day as needed for cough (Patient not taking: No sig reported) 20 capsule 0    cholecalciferol (VITAMIN D3) 1,000 units tablet Take 1 tablet (1,000 Units total) by mouth daily Start after done with the high-dose  90 tablet 3    meclizine (ANTIVERT) 25 mg tablet Take 1 tablet (25 mg total) by mouth every 8 (eight) hours (Patient not taking: No sig reported) 15 tablet 0    Pulmicort Flexhaler 180 MCG/ACT inhaler INHALE 2 PUFFS BY MOUTH TWICE DAILY  RINSE MOUTH AFTER USE (Patient not taking: No sig reported) 1 each 0     No current facility-administered medications for this visit          Allergies   Allergen Reactions    Penicillins Hives       Review of Systems   Constitutional: Negative for chills, fatigue and fever  Respiratory: Negative for cough, chest tightness and shortness of breath  Cardiovascular: Negative for chest pain, palpitations and leg swelling  Gastrointestinal: Positive for diarrhea  Negative for abdominal distention, abdominal pain, blood in stool, constipation and nausea  Genitourinary: Negative for difficulty urinating and dysuria  Musculoskeletal: Negative for arthralgias, back pain, gait problem, joint swelling, myalgias and neck pain  Skin: Negative for rash  Neurological: Negative for dizziness, weakness, numbness and headaches  Psychiatric/Behavioral: Negative for agitation  Video Exam    There were no vitals filed for this visit      Physical Exam     I spent 15 minutes directly with the patient during this visit

## 2022-10-05 ENCOUNTER — TELEPHONE (OUTPATIENT)
Dept: FAMILY MEDICINE CLINIC | Facility: CLINIC | Age: 27
End: 2022-10-05

## 2022-10-05 DIAGNOSIS — K04.7 DENTAL INFECTION: Primary | ICD-10-CM

## 2022-10-05 RX ORDER — CEFUROXIME AXETIL 500 MG/1
500 TABLET ORAL EVERY 12 HOURS SCHEDULED
Qty: 10 TABLET | Refills: 0 | Status: SHIPPED | OUTPATIENT
Start: 2022-10-05 | End: 2022-10-10

## 2022-10-05 NOTE — TELEPHONE ENCOUNTER
Pt is requesting that an antibiotic be sent to the [facility] on [facility]

## 2022-10-13 ENCOUNTER — CONSULT (OUTPATIENT)
Dept: GASTROENTEROLOGY | Facility: AMBULARY SURGERY CENTER | Age: 27
End: 2022-10-13
Payer: COMMERCIAL

## 2022-10-13 VITALS
OXYGEN SATURATION: 98 % | HEART RATE: 81 BPM | WEIGHT: 230 LBS | BODY MASS INDEX: 34.86 KG/M2 | HEIGHT: 68 IN | SYSTOLIC BLOOD PRESSURE: 140 MMHG | DIASTOLIC BLOOD PRESSURE: 88 MMHG

## 2022-10-13 DIAGNOSIS — R10.9 ABDOMINAL PAIN, UNSPECIFIED ABDOMINAL LOCATION: ICD-10-CM

## 2022-10-13 DIAGNOSIS — R19.7 FREQUENT DIARRHEA: ICD-10-CM

## 2022-10-13 DIAGNOSIS — K21.00 GASTROESOPHAGEAL REFLUX DISEASE WITH ESOPHAGITIS WITHOUT HEMORRHAGE: ICD-10-CM

## 2022-10-13 DIAGNOSIS — K76.0 HEPATIC STEATOSIS: ICD-10-CM

## 2022-10-13 DIAGNOSIS — E66.9 CLASS 1 OBESITY WITHOUT SERIOUS COMORBIDITY WITH BODY MASS INDEX (BMI) OF 34.0 TO 34.9 IN ADULT, UNSPECIFIED OBESITY TYPE: ICD-10-CM

## 2022-10-13 DIAGNOSIS — K22.719 BARRETT'S ESOPHAGUS WITH DYSPLASIA: Primary | ICD-10-CM

## 2022-10-13 DIAGNOSIS — R74.8 ELEVATED LIVER ENZYMES: ICD-10-CM

## 2022-10-13 PROCEDURE — 99214 OFFICE O/P EST MOD 30 MIN: CPT | Performed by: PHYSICIAN ASSISTANT

## 2022-10-13 RX ORDER — HYOSCYAMINE SULFATE 0.125 MG
0.12 TABLET ORAL
Qty: 90 TABLET | Refills: 2 | Status: SHIPPED | OUTPATIENT
Start: 2022-10-13

## 2022-10-13 NOTE — PATIENT INSTRUCTIONS
I recommend you try Ibgard (peppermint supplement) 2-3 x a day which can help with gas/bloating/abdominal pain    Scheduled date of EGD(as of today):1/9/2023  Physician performing EGD:DR MCGILL  Location of EGD:[facility]  Instructions reviewed with patient by:QUITA GARDUNO  Clearances:

## 2022-10-13 NOTE — PROGRESS NOTES
[facility] - Outpatient Follow-up Note  Jordin Dumont 33 y o  male MRN: [id number]  Encounter: [id number]          ASSESSMENT AND PLAN:      1  Oden's esophagus  Reports EGD at outside facility and believes needing repeat in 3-5 years  Chart review states Oden's with dysplasia however I do not see any biopsy results  Takes PPI daily with control of his reflux symptoms  -schedule repeat EGD for further assessment   -continue PPI  -further recommendations after endoscopy  -continue to avoid smoking alcohol use  2  Chronic diarrhea  3  Abdominal cramping  4  Bloating  Reports chronic loose stools with 3-4 daily and associated abdominal cramping  Reports prior colonoscopy at outside facility which was normal   He had fecal calprotectin at last office visit which was normal   No blood in the stool  Suspect to be in the setting of irritable bowel syndrome     -no indication for repeat colonoscopy at this time  -start Levsin 3 times daily prior to meals to help with abdominal cramping and urgency  -start IBgard  -will check celiac panel     -if above recommendations are without improvement at next office visit, consider low FODMAP diet or trial of Xifaxan      5  Elevated liver tests  6  Hepatic steatosis  Patient has had a fluctuating elevation of ALT and bilirubin for over 1 year  He had ultrasound showing hepatomegaly 19 2 cm and hepatic steatosis  He had lipid panel year ago with elevated cholesterol over 200 and triglycerides over 400  He reports 15 pound weight loss in the past year  Denies any significant alcohol use  Lipitor was possibly started around the time of liver enzyme elevation however this is unclear     -will repeat lab work at this time    Due to persistent elevation of liver enzymes will also pursue full serological workup to rule out autoimmune conditions, hepatitis etc   -continue follow-up with PCP regarding elevated lipids  -will obtain US elastography  -referral placed for weight management  Patient has significant concerns due to continuously gaining weight despite activity level  Also discussed the possible progression of fatty liver disease to cirrhosis in the future and the benefits of a 5-10 percent weight loss   -based on lab work, could consider vitamin E   -patient will follow-up with hepatologist in a few months        Follow-up after procedure with me   ______________________________________________________________________    SUBJECTIVE:      Jaylyn Berkowitz is a 60-year-old male with past medical history of Oden's, hypertension, hyperlipidemia who presents to the office for follow-up for diarrhea  Patient was last in the office 08/2021 for GERD and diarrhea  He had stool studies at that time which were normal for fecal calprotectin and pancreatic elastase  He continues to report lower abdominal cramping associated 3-4 loose bowel movements daily  He denies any blood in the stool  He reports having abdominal pain immediately after eating  He reports happens with any foods and he has not been able to identify any food triggers  He reports trying Imodium as recommended by PCP which she reports made the symptoms worse  Patient is also concerned with persistent weight gain  He reports he walks 3 miles a day but has still been gaining weight  Ultrasound 08/2021 with hepatomegaly and steatosis  He has also had some fluctuating persistent elevation of ALT since last year  Triglycerides were also significantly elevated at 400 with cholesterol 255  He is on Crestor  He has not had this repeated since then  Most recent liver enzymes with ALT of 100 and total bilirubin of 1 03  Platelets 117  Patient denies any reflux symptoms  He takes PPI daily  He reports nausea but no vomiting  Patient denies any current or previous significant alcohol use  No history of hepatitis  Denies any family history of liver disease  Patient reports EGD and colonoscopy around 3 years ago at outside facility  He reports colonoscopy was normal   Reports he was recommended repeat endoscopy in 3-5 years for Barretts  REVIEW OF SYSTEMS IS OTHERWISE NEGATIVE  Historical Information   Past Medical History:   Diagnosis Date   • Anxiety    • Hypertension      Past Surgical History:   Procedure Laterality Date   • TONSILLECTOMY       Social History   Social History     Substance and Sexual Activity   Alcohol Use Yes    Comment: holiday/special occassion     Social History     Substance and Sexual Activity   Drug Use Never     Social History     Tobacco Use   Smoking Status Never Smoker   Smokeless Tobacco Never Used     Family History   Problem Relation Age of Onset   • Diabetes Mother    • Hypertension Mother    • Anxiety disorder Mother    • Heart attack Father    • Hypertension Father    • Anxiety disorder Father        Meds/Allergies       Current Outpatient Medications:   •  albuterol (PROVENTIL HFA,VENTOLIN HFA) 90 mcg/act inhaler  •  cholecalciferol (VITAMIN D3) 1,000 units tablet  •  hyoscyamine (Levsin) 0 125 MG tablet  •  multivitamin (THERAGRAN) TABS  •  pantoprazole (PROTONIX) 40 mg tablet  •  rosuvastatin (CRESTOR) 20 MG tablet  •  traZODone (DESYREL) 100 mg tablet  •  benzonatate (TESSALON) 200 MG capsule  •  loperamide (IMODIUM) 2 mg capsule  •  meclizine (ANTIVERT) 25 mg tablet  •  montelukast (SINGULAIR) 10 mg tablet  •  Pulmicort Flexhaler 180 MCG/ACT inhaler    Allergies   Allergen Reactions   • Penicillins Hives           Objective     Blood pressure 140/88, pulse 81, height 5' 8" (1 727 m), weight 104 kg (230 lb), SpO2 98 %  Body mass index is 34 97 kg/m²        PHYSICAL EXAM:      General Appearance:   Alert, cooperative, no distress   HEENT:   Normocephalic, atraumatic, anicteric      Neck:  Supple, symmetrical, trachea midline   Lungs:   Clear to auscultation bilaterally; no rales, rhonchi or wheezing; respirations unlabored    Heart[de-identified]   Regular rate and rhythm; no murmur, rub, or gallop  Abdomen:   Soft, non-tender, non-distended; normal bowel sounds; no masses, no organomegaly    Genitalia:   Deferred    Rectal:   Deferred    Extremities:  No cyanosis, clubbing or edema    Pulses:  2+ and symmetric    Skin:  No jaundice, rashes, or lesions    Lymph nodes:  No palpable cervical lymphadenopathy        Lab Results:   No visits with results within 1 Day(s) from this visit  Latest known visit with results is:   Orders Only on 12/14/2021   Component Date Value   • SARS-CoV-2 12/14/2021 Negative          Radiology Results:   No results found

## 2022-10-27 ENCOUNTER — TELEPHONE (OUTPATIENT)
Dept: GASTROENTEROLOGY | Facility: CLINIC | Age: 27
End: 2022-10-27

## 2022-10-27 NOTE — TELEPHONE ENCOUNTER
Patients GI provider:  BRY Jensen    Number to return call: [phone]     Reason for call: Pt called and stated medication hyoscyamine is making him very sick an would like to know if there is anything else he can take please reach out to patient thank you     Scheduled procedure/appointment date if applicable: Appt 82/34/6754

## 2022-10-27 NOTE — TELEPHONE ENCOUNTER
Please have him d/c the levsin as this worsened his symptoms  If he is dizzy/presyncoapl, he should be seen in the ED ASAP  Thanks

## 2022-10-27 NOTE — TELEPHONE ENCOUNTER
OV 10/13/22 [place]  FU 12/15/22 [place]  EGD scheduled 1/9/23     H/O  1  Oden's esophagus  2  Chronic diarrhea  3  Abdominal cramping  4  Bloating  5  Elevated liver tests  6  Hepatic steatosis    MEDS  Hyoscyamine 0 125mg 3x daily  Imodium 2 mg 4x daily PRN  Protonix 40 mg 1x daily    Spoke with pt reporting symptoms of ongoing diarrhea that increased in frequency since starting th hyoscyamine  He also reported having episodes of feeling dizzy & nauseated  Continues with abdominal cramping  Denies fever, bleeding, severe pain  He would like an alternative medication  He admits to taking Protonix & imodium as prescribed  Has not started the IB guard due to affordability   Reviewed importance of hydration and recommended gatorade for elyte balance

## 2022-10-27 NOTE — TELEPHONE ENCOUNTER
Spoke with pt and relayed recommendations  Pt now denies using the imodium for the diarrhea  Recommended he do so  reiterated the importance of hydration and low lowfodmapdiet  Pt  Instructed to stop taking the levsin and to report to ED if syncopal symptoms reoccur   Pt verbalized understanding and ended call abruptly

## 2022-10-28 DIAGNOSIS — F51.01 PRIMARY INSOMNIA: ICD-10-CM

## 2022-10-28 RX ORDER — TRAZODONE HYDROCHLORIDE 100 MG/1
TABLET ORAL
Qty: 90 TABLET | Refills: 1 | Status: SHIPPED | OUTPATIENT
Start: 2022-10-28

## 2022-11-04 ENCOUNTER — TELEMEDICINE (OUTPATIENT)
Dept: FAMILY MEDICINE CLINIC | Facility: CLINIC | Age: 27
End: 2022-11-04

## 2022-11-04 DIAGNOSIS — R19.5 LOOSE STOOLS: Primary | ICD-10-CM

## 2022-11-04 NOTE — PROGRESS NOTES
Virtual Regular Visit    Verification of patient location:    Patient is located in the following state in which I hold an active license [place]      Assessment/Plan:    Problem List Items Addressed This Visit        Other    Loose stools - Primary      Continue follow-up with Gastroenterology  Letter sent to his employer to adjust his schedule         Reason for visit is No chief complaint on file  Encounter provider Germania Mancini MD    Provider located at [place] [id number] [address] [id number] [phone]      Recent Visits  No visits were found meeting these conditions  Showing recent visits within past 7 days and meeting all other requirements  Today's Visits  Date Type Provider Dept   11/04/22 Telemedicine Germania Mancini MD [place] [address] Primary Care   Showing today's visits and meeting all other requirements  Future Appointments  No visits were found meeting these conditions  Showing future appointments within next 150 days and meeting all other requirements       The patient was identified by name and date of birth  María Grimm was informed that this is a telemedicine visit and that the visit is being conducted through the [employer]  He agrees to proceed     My office door was closed  No one else was in the room  He acknowledged consent and understanding of privacy and security of the video platform  The patient has agreed to participate and understands they can discontinue the visit at any time  Patient is aware this is a billable service  Subjective  María Grimm is a 33 y o  male   Patient called today with complaints of frequent episodes of loose stools that he has on a daily basis, he said that he has worse symptoms on Sundays and Mondays  He follows up with GI service for that    He called today to work with me on his forms for his job to update his schedule due to his symptoms  Past Medical History:   Diagnosis Date   • Anxiety    • Hypertension        Past Surgical History:   Procedure Laterality Date   • TONSILLECTOMY         Current Outpatient Medications   Medication Sig Dispense Refill   • albuterol (PROVENTIL HFA,VENTOLIN HFA) 90 mcg/act inhaler INHALE 2 PUFFS BY MOUTH EVERY 6 HOURS AS NEEDED FOR WHEEZING 25 5 g 0   • benzonatate (TESSALON) 200 MG capsule Take 1 capsule (200 mg total) by mouth 3 (three) times a day as needed for cough (Patient not taking: No sig reported) 20 capsule 0   • cholecalciferol (VITAMIN D3) 1,000 units tablet Take 1 tablet (1,000 Units total) by mouth daily Start after done with the high-dose  90 tablet 3   • hyoscyamine (Levsin) 0 125 MG tablet Take 1 tablet (0 125 mg total) by mouth 3 (three) times a day before meals 90 tablet 2   • loperamide (IMODIUM) 2 mg capsule Take 1 capsule (2 mg total) by mouth 4 (four) times a day as needed for diarrhea (Patient not taking: Reported on 10/13/2022) 90 capsule 0   • meclizine (ANTIVERT) 25 mg tablet Take 1 tablet (25 mg total) by mouth every 8 (eight) hours (Patient not taking: No sig reported) 15 tablet 0   • montelukast (SINGULAIR) 10 mg tablet Take 1 tablet (10 mg total) by mouth daily at bedtime (Patient not taking: Reported on 10/13/2022) 90 tablet 0   • multivitamin (THERAGRAN) TABS Take 1 tablet by mouth daily     • pantoprazole (PROTONIX) 40 mg tablet TAKE 1 TABLET(40 MG) BY MOUTH DAILY 90 tablet 3   • Pulmicort Flexhaler 180 MCG/ACT inhaler INHALE 2 PUFFS BY MOUTH TWICE DAILY  RINSE MOUTH AFTER USE (Patient not taking: No sig reported) 1 each 0   • rosuvastatin (CRESTOR) 20 MG tablet Take 1 tablet (20 mg total) by mouth daily 90 tablet 2   • traZODone (DESYREL) 100 mg tablet TAKE 2 TABLETS(200 MG) BY MOUTH DAILY AT BEDTIME 90 tablet 1     No current facility-administered medications for this visit          Allergies   Allergen Reactions   • Penicillins Hives       Review of Systems Gastrointestinal: Positive for abdominal distention, blood in stool and diarrhea  Negative for abdominal pain, anal bleeding, nausea and vomiting  Video Exam    There were no vitals filed for this visit  Physical Exam  Constitutional:       General: He is not in acute distress  Appearance: He is not toxic-appearing  Cardiovascular:      Rate and Rhythm: Normal rate  Heart sounds: No murmur heard  No gallop  Pulmonary:      Effort: No respiratory distress  Breath sounds: No wheezing or rales  Neurological:      Mental Status: He is alert            I spent 15 minutes directly with the patient during this visit

## 2022-11-04 NOTE — LETTER
November 4, 2022     Patient: Oksana Quan  YOB: 1995  Date of Visit: 11/4/2022      To Whom it May Concern:    [place] is under my professional care  Duc Moraes was seen  on 11/4/2022  Duc Moraes still continues to have chronic diarrhea, he follows up with Gastroenterology Service and he is currently on treatment  Looks like his symptoms are still ongoing and they are more prominent on Monday that prevent in from performing his job duties  We kindly ask you to update his current schedule and change his day off from Thursday to Monday due to his ongoing problem  Also due to frequency of his symptoms please provide him with extra breaks of 15 minutes every hour as needed  If you have any questions or concerns, please don't hesitate to call           Sincerely,          Ngozi Chapman MD        CC: No Recipients

## 2022-11-15 ENCOUNTER — OFFICE VISIT (OUTPATIENT)
Dept: FAMILY MEDICINE CLINIC | Facility: CLINIC | Age: 27
End: 2022-11-15

## 2022-11-15 VITALS
WEIGHT: 236 LBS | SYSTOLIC BLOOD PRESSURE: 150 MMHG | DIASTOLIC BLOOD PRESSURE: 102 MMHG | TEMPERATURE: 99.1 F | HEART RATE: 102 BPM | BODY MASS INDEX: 35.77 KG/M2 | HEIGHT: 68 IN | OXYGEN SATURATION: 97 % | RESPIRATION RATE: 12 BRPM

## 2022-11-15 DIAGNOSIS — J02.9 SORE THROAT: Primary | ICD-10-CM

## 2022-11-15 DIAGNOSIS — R52 BODY ACHES: ICD-10-CM

## 2022-11-15 RX ORDER — OSELTAMIVIR PHOSPHATE 75 MG/1
75 CAPSULE ORAL 2 TIMES DAILY
Qty: 10 CAPSULE | Refills: 0 | Status: SHIPPED | OUTPATIENT
Start: 2022-11-15 | End: 2022-11-20

## 2022-11-15 RX ORDER — AZITHROMYCIN 250 MG/1
TABLET, FILM COATED ORAL
Qty: 6 TABLET | Refills: 0 | Status: SHIPPED | OUTPATIENT
Start: 2022-11-15 | End: 2022-11-20

## 2022-11-15 NOTE — LETTER
November 15, 2022     Patient: Ruth Rao  YOB: 1995  Date of Visit: 11/15/2022      To Whom it May Concern:    Luis Jerez is under my professional care  Andrade Redding was seen in my office on 11/15/2022  Maudine Neat Will be excused from work on 11/15/2022 and 11/16/2022  If you have any questions or concerns, please don't hesitate to call           Sincerely,          Ofelia Kauffman MD        CC: No Recipients

## 2022-11-16 LAB
SARS-COV-2 AG UPPER RESP QL IA: NEGATIVE
VALID CONTROL: NORMAL

## 2022-11-16 NOTE — PROGRESS NOTES
Assessment/Plan:    No problem-specific Assessment & Plan notes found for this encounter  Diagnoses and all orders for this visit:    Sore throat  -     POCT Rapid Covid Ag  -     azithromycin (Zithromax) 250 mg tablet; Take 2 tablets (500 mg total) by mouth daily for 1 day, THEN 1 tablet (250 mg total) daily for 4 days  -     oseltamivir (TAMIFLU) 75 mg capsule; Take 1 capsule (75 mg total) by mouth 2 (two) times a day for 5 days  -     Covid/Flu- Office Collect    Body aches  -     POCT Rapid Covid Ag  -     azithromycin (Zithromax) 250 mg tablet; Take 2 tablets (500 mg total) by mouth daily for 1 day, THEN 1 tablet (250 mg total) daily for 4 days  -     oseltamivir (TAMIFLU) 75 mg capsule; Take 1 capsule (75 mg total) by mouth 2 (two) times a day for 5 days  -     Covid/Flu- Office Collect          COVID-19 rapid test is negative in the office, clinical presentation consistent with flu, he did not get his vaccination this year  Will empirically start on Tamiflu, will add azithromycin  Will collect flu PCR if it will be negative Tamiflu will be stop  He will continue to watch his symptoms any worsening being on the medications he will let me know  Subjective:      Patient ID: Viri Cole is a 33 y o  male  HPI    The following portions of the patient's history were reviewed and updated as appropriate: allergies, current medications, past family history, past medical history, past social history, past surgical history, and problem list     Review of Systems   Constitutional: Positive for chills and fever  Negative for appetite change and fatigue  HENT: Positive for rhinorrhea  Negative for sinus pain and sore throat  Respiratory: Positive for cough  Negative for chest tightness and shortness of breath  Cardiovascular: Negative for chest pain  Gastrointestinal: Negative for diarrhea, nausea and vomiting  Musculoskeletal: Positive for arthralgias and myalgias     Neurological: Positive for headaches  Objective:      BP (!) 150/102 (BP Location: Left arm, Patient Position: Sitting, Cuff Size: Standard)   Pulse 102   Temp 99 1 °F (37 3 °C)   Resp 12   Ht 5' 8" (1 727 m)   Wt 107 kg (236 lb)   SpO2 97%   BMI 35 88 kg/m²     Allergies   Allergen Reactions   • Penicillins Hives          Current Outpatient Medications:   •  albuterol (PROVENTIL HFA,VENTOLIN HFA) 90 mcg/act inhaler, INHALE 2 PUFFS BY MOUTH EVERY 6 HOURS AS NEEDED FOR WHEEZING, Disp: 25 5 g, Rfl: 0  •  azithromycin (Zithromax) 250 mg tablet, Take 2 tablets (500 mg total) by mouth daily for 1 day, THEN 1 tablet (250 mg total) daily for 4 days  , Disp: 6 tablet, Rfl: 0  •  hyoscyamine (Levsin) 0 125 MG tablet, Take 1 tablet (0 125 mg total) by mouth 3 (three) times a day before meals, Disp: 90 tablet, Rfl: 2  •  multivitamin (THERAGRAN) TABS, Take 1 tablet by mouth daily, Disp: , Rfl:   •  oseltamivir (TAMIFLU) 75 mg capsule, Take 1 capsule (75 mg total) by mouth 2 (two) times a day for 5 days, Disp: 10 capsule, Rfl: 0  •  pantoprazole (PROTONIX) 40 mg tablet, TAKE 1 TABLET(40 MG) BY MOUTH DAILY, Disp: 90 tablet, Rfl: 3  •  rosuvastatin (CRESTOR) 20 MG tablet, Take 1 tablet (20 mg total) by mouth daily, Disp: 90 tablet, Rfl: 2  •  traZODone (DESYREL) 100 mg tablet, TAKE 2 TABLETS(200 MG) BY MOUTH DAILY AT BEDTIME, Disp: 90 tablet, Rfl: 1  •  benzonatate (TESSALON) 200 MG capsule, Take 1 capsule (200 mg total) by mouth 3 (three) times a day as needed for cough (Patient not taking: No sig reported), Disp: 20 capsule, Rfl: 0  •  cholecalciferol (VITAMIN D3) 1,000 units tablet, Take 1 tablet (1,000 Units total) by mouth daily Start after done with the high-dose , Disp: 90 tablet, Rfl: 3  •  loperamide (IMODIUM) 2 mg capsule, Take 1 capsule (2 mg total) by mouth 4 (four) times a day as needed for diarrhea (Patient not taking: No sig reported), Disp: 90 capsule, Rfl: 0  •  meclizine (ANTIVERT) 25 mg tablet, Take 1 tablet (25 mg total) by mouth every 8 (eight) hours (Patient not taking: No sig reported), Disp: 15 tablet, Rfl: 0  •  montelukast (SINGULAIR) 10 mg tablet, Take 1 tablet (10 mg total) by mouth daily at bedtime (Patient not taking: No sig reported), Disp: 90 tablet, Rfl: 0  •  Pulmicort Flexhaler 180 MCG/ACT inhaler, INHALE 2 PUFFS BY MOUTH TWICE DAILY  RINSE MOUTH AFTER USE (Patient not taking: No sig reported), Disp: 1 each, Rfl: 0     There are no Patient Instructions on file for this visit  Physical Exam  Constitutional:       General: He is not in acute distress  Appearance: He is not ill-appearing or toxic-appearing  HENT:      Right Ear: There is impacted cerumen  Left Ear: Tympanic membrane normal  There is no impacted cerumen  Nose: Congestion and rhinorrhea present  Mouth/Throat:      Pharynx: Posterior oropharyngeal erythema present  No oropharyngeal exudate  Cardiovascular:      Rate and Rhythm: Tachycardia present  Heart sounds: No murmur heard  Pulmonary:      Effort: No respiratory distress  Breath sounds: No wheezing or rales

## 2022-11-17 LAB
FLUAV RNA RESP QL NAA+PROBE: NEGATIVE
FLUBV RNA RESP QL NAA+PROBE: NEGATIVE
SARS-COV-2 RNA RESP QL NAA+PROBE: NEGATIVE

## 2022-11-22 ENCOUNTER — TELEMEDICINE (OUTPATIENT)
Dept: FAMILY MEDICINE CLINIC | Facility: CLINIC | Age: 27
End: 2022-11-22

## 2022-11-22 DIAGNOSIS — R05.9 COUGH: ICD-10-CM

## 2022-11-22 DIAGNOSIS — J40 BRONCHITIS: Primary | ICD-10-CM

## 2022-11-22 RX ORDER — LEVOFLOXACIN 500 MG/1
500 TABLET, FILM COATED ORAL EVERY 24 HOURS
Qty: 5 TABLET | Refills: 0 | Status: SHIPPED | OUTPATIENT
Start: 2022-11-22 | End: 2022-11-27

## 2022-11-22 RX ORDER — BENZONATATE 200 MG/1
200 CAPSULE ORAL 3 TIMES DAILY PRN
Qty: 30 CAPSULE | Refills: 0 | Status: SHIPPED | OUTPATIENT
Start: 2022-11-22

## 2022-11-22 NOTE — PROGRESS NOTES
Virtual Regular Visit    Verification of patient location:    Patient is located in the following state in which I hold an active license [place]      Assessment/Plan:    Problem List Items Addressed This Visit    None  Visit Diagnoses     Bronchitis    -  Primary    Relevant Medications    benzonatate (TESSALON) 200 MG capsule    levofloxacin (LEVAQUIN) 500 mg tablet    Cough        Relevant Medications    benzonatate (TESSALON) 200 MG capsule      Discussed with Ophelia Patino  that most likely etiology is viral but because of lack of improvement will consider of starting levofloxacin 5 mg daily  He is allergic to penicillins and doxycycline  He will wait until tomorrow if no improvement by tomorrow then he will started  He will update me in 3 days  Side effects discussed  Will also use benzonatate for symptomatic control of cough  Reason for visit is   Chief Complaint   Patient presents with   • Virtual Regular Visit        Encounter provider Ghada Lopez MD    Provider located at [address] [address]  [phone]      Recent Visits  Date Type Provider Dept   11/15/22 Office Visit MD [facility]   Showing recent visits within past 7 days and meeting all other requirements  Today's Visits  Date Type Provider Dept   11/22/22 Telemedicine MD [facility]   Showing today's visits and meeting all other requirements  Future Appointments  No visits were found meeting these conditions  Showing future appointments within next 150 days and meeting all other requirements       The patient was identified by name and date of birth  Linda Key was informed that this is a telemedicine visit and that the visit is being conducted through the [facility]  He agrees to proceed     My office door was closed   No one else was in the room  He acknowledged consent and understanding of privacy and security of the video platform  The patient has agreed to participate and understands they can discontinue the visit at any time  Patient is aware this is a billable service  Yuliet Arevalo is a 33 y o  male   Patient called today with ongoing complaints of cough with whitish sputum and chills  He recently finished course of azithromycin with no significant improvement  Past Medical History:   Diagnosis Date   • Anxiety    • Hypertension        Past Surgical History:   Procedure Laterality Date   • TONSILLECTOMY         Current Outpatient Medications   Medication Sig Dispense Refill   • benzonatate (TESSALON) 200 MG capsule Take 1 capsule (200 mg total) by mouth 3 (three) times a day as needed for cough 30 capsule 0   • levofloxacin (LEVAQUIN) 500 mg tablet Take 1 tablet (500 mg total) by mouth every 24 hours for 5 days 5 tablet 0   • albuterol (PROVENTIL HFA,VENTOLIN HFA) 90 mcg/act inhaler INHALE 2 PUFFS BY MOUTH EVERY 6 HOURS AS NEEDED FOR WHEEZING 25 5 g 0   • cholecalciferol (VITAMIN D3) 1,000 units tablet Take 1 tablet (1,000 Units total) by mouth daily Start after done with the high-dose   90 tablet 3   • hyoscyamine (Levsin) 0 125 MG tablet Take 1 tablet (0 125 mg total) by mouth 3 (three) times a day before meals 90 tablet 2   • loperamide (IMODIUM) 2 mg capsule Take 1 capsule (2 mg total) by mouth 4 (four) times a day as needed for diarrhea (Patient not taking: No sig reported) 90 capsule 0   • meclizine (ANTIVERT) 25 mg tablet Take 1 tablet (25 mg total) by mouth every 8 (eight) hours (Patient not taking: No sig reported) 15 tablet 0   • montelukast (SINGULAIR) 10 mg tablet Take 1 tablet (10 mg total) by mouth daily at bedtime (Patient not taking: No sig reported) 90 tablet 0   • multivitamin (THERAGRAN) TABS Take 1 tablet by mouth daily     • pantoprazole (PROTONIX) 40 mg tablet TAKE 1 TABLET(40 MG) BY MOUTH DAILY 90 tablet 3   • Pulmicort Flexhaler 180 MCG/ACT inhaler INHALE 2 PUFFS BY MOUTH TWICE DAILY  RINSE MOUTH AFTER USE (Patient not taking: No sig reported) 1 each 0   • rosuvastatin (CRESTOR) 20 MG tablet Take 1 tablet (20 mg total) by mouth daily 90 tablet 2   • traZODone (DESYREL) 100 mg tablet TAKE 2 TABLETS(200 MG) BY MOUTH DAILY AT BEDTIME 90 tablet 1     No current facility-administered medications for this visit  Allergies   Allergen Reactions   • Penicillins Hives       Review of Systems   Constitutional: Positive for chills and fever  Negative for appetite change and fatigue  HENT: Positive for rhinorrhea  Negative for sinus pain and sore throat  Respiratory: Positive for cough  Negative for chest tightness and shortness of breath  Cardiovascular: Negative for chest pain  Gastrointestinal: Negative for diarrhea, nausea and vomiting  Musculoskeletal: Negative for arthralgias and myalgias  Neurological: Positive for headaches  Video Exam    There were no vitals filed for this visit  Physical Exam  Neurological:      Mental Status: He is alert            I spent 15 minutes directly with the patient during this visit

## 2022-11-28 DIAGNOSIS — J30.9 ALLERGIC RHINITIS, UNSPECIFIED SEASONALITY, UNSPECIFIED TRIGGER: ICD-10-CM

## 2022-11-28 RX ORDER — MONTELUKAST SODIUM 10 MG/1
TABLET ORAL
Qty: 90 TABLET | Refills: 0 | Status: SHIPPED | OUTPATIENT
Start: 2022-11-28

## 2022-12-06 ENCOUNTER — OFFICE VISIT (OUTPATIENT)
Dept: FAMILY MEDICINE CLINIC | Facility: CLINIC | Age: 27
End: 2022-12-06

## 2022-12-06 ENCOUNTER — APPOINTMENT (OUTPATIENT)
Dept: RADIOLOGY | Facility: MEDICAL CENTER | Age: 27
End: 2022-12-06

## 2022-12-06 ENCOUNTER — APPOINTMENT (OUTPATIENT)
Dept: LAB | Facility: MEDICAL CENTER | Age: 27
End: 2022-12-06

## 2022-12-06 VITALS
HEART RATE: 133 BPM | TEMPERATURE: 103 F | SYSTOLIC BLOOD PRESSURE: 122 MMHG | OXYGEN SATURATION: 97 % | HEIGHT: 68 IN | WEIGHT: 235 LBS | RESPIRATION RATE: 12 BRPM | DIASTOLIC BLOOD PRESSURE: 80 MMHG | BODY MASS INDEX: 35.61 KG/M2

## 2022-12-06 DIAGNOSIS — J40 BRONCHITIS: ICD-10-CM

## 2022-12-06 DIAGNOSIS — J40 BRONCHITIS: Primary | ICD-10-CM

## 2022-12-06 DIAGNOSIS — Z11.52 ENCOUNTER FOR SCREENING FOR COVID-19: ICD-10-CM

## 2022-12-06 LAB
AMORPH URATE CRY URNS QL MICRO: ABNORMAL
BACTERIA UR QL AUTO: ABNORMAL /HPF
BASOPHILS # BLD AUTO: 0.04 THOUSANDS/ÂΜL (ref 0–0.1)
BASOPHILS NFR BLD AUTO: 1 % (ref 0–1)
BILIRUB UR QL STRIP: NEGATIVE
CLARITY UR: ABNORMAL
COLOR UR: ABNORMAL
EOSINOPHIL # BLD AUTO: 0.03 THOUSAND/ÂΜL (ref 0–0.61)
EOSINOPHIL NFR BLD AUTO: 1 % (ref 0–6)
ERYTHROCYTE [DISTWIDTH] IN BLOOD BY AUTOMATED COUNT: 11.9 % (ref 11.6–15.1)
GLUCOSE UR STRIP-MCNC: NEGATIVE MG/DL
HCT VFR BLD AUTO: 47.8 % (ref 36.5–49.3)
HGB BLD-MCNC: 15.5 G/DL (ref 12–17)
HGB UR QL STRIP.AUTO: NEGATIVE
IMM GRANULOCYTES # BLD AUTO: 0.01 THOUSAND/UL (ref 0–0.2)
IMM GRANULOCYTES NFR BLD AUTO: 0 % (ref 0–2)
KETONES UR STRIP-MCNC: NEGATIVE MG/DL
LEUKOCYTE ESTERASE UR QL STRIP: NEGATIVE
LYMPHOCYTES # BLD AUTO: 0.34 THOUSANDS/ÂΜL (ref 0.6–4.47)
LYMPHOCYTES NFR BLD AUTO: 6 % (ref 14–44)
MCH RBC QN AUTO: 30.1 PG (ref 26.8–34.3)
MCHC RBC AUTO-ENTMCNC: 32.4 G/DL (ref 31.4–37.4)
MCV RBC AUTO: 93 FL (ref 82–98)
MONOCYTES # BLD AUTO: 0.68 THOUSAND/ÂΜL (ref 0.17–1.22)
MONOCYTES NFR BLD AUTO: 12 % (ref 4–12)
MUCOUS THREADS UR QL AUTO: ABNORMAL
NEUTROPHILS # BLD AUTO: 4.48 THOUSANDS/ÂΜL (ref 1.85–7.62)
NEUTS SEG NFR BLD AUTO: 80 % (ref 43–75)
NITRITE UR QL STRIP: NEGATIVE
NON-SQ EPI CELLS URNS QL MICRO: ABNORMAL /HPF
NRBC BLD AUTO-RTO: 0 /100 WBCS
PH UR STRIP.AUTO: 6 [PH]
PLATELET # BLD AUTO: 266 THOUSANDS/UL (ref 149–390)
PMV BLD AUTO: 11.2 FL (ref 8.9–12.7)
PROCALCITONIN SERPL-MCNC: 0.2 NG/ML
PROT UR STRIP-MCNC: ABNORMAL MG/DL
RBC # BLD AUTO: 5.15 MILLION/UL (ref 3.88–5.62)
RBC #/AREA URNS AUTO: ABNORMAL /HPF
SARS-COV-2 AG UPPER RESP QL IA: NEGATIVE
SP GR UR STRIP.AUTO: 1.03 (ref 1–1.03)
UROBILINOGEN UR STRIP-ACNC: <2 MG/DL
VALID CONTROL: NORMAL
WBC # BLD AUTO: 5.58 THOUSAND/UL (ref 4.31–10.16)
WBC #/AREA URNS AUTO: ABNORMAL /HPF

## 2022-12-06 RX ORDER — CIPROFLOXACIN 500 MG/1
500 TABLET, FILM COATED ORAL EVERY 12 HOURS SCHEDULED
Qty: 10 TABLET | Refills: 0 | Status: SHIPPED | OUTPATIENT
Start: 2022-12-06 | End: 2022-12-11

## 2022-12-06 NOTE — PROGRESS NOTES
Assessment/Plan:    Bronchitis  Looks like patient's symptoms got worse, concern for pneumonia  Most likely viral etiology but will do CBC and procalcitonin  Will start empirically on ciprofloxacin 500 mg twice daily  He is allergic to penicillins and tetracyclines  I also will send him for x-ray due to high suspicion for pneumonia  If his symptoms will not start to improve within the next 24 hours I will send him to the hospital         Diagnoses and all orders for this visit:    Bronchitis  -     XR chest pa & lateral; Future  -     XR chest pa & lateral; Future  -     CBC and differential; Future  -     Procalcitonin; Future  -     ciprofloxacin (CIPRO) 500 mg tablet; Take 1 tablet (500 mg total) by mouth every 12 (twelve) hours for 5 days    Encounter for screening for COVID-19  -     POCT Rapid Covid Ag          Subjective:      Patient ID: Jm Odom is a 33 y o  male  Patient came today with ongoing complaint of cough that he has already for few weeks, she tested negative for COVID-19 and flu, he was recently treated with levofloxacin  He is allergic to doxycycline and Augmentin  He is blood pressures are okay, he is tachycardic, he came today with fever of 103  The following portions of the patient's history were reviewed and updated as appropriate: allergies, current medications, past family history, past medical history, past social history, past surgical history, and problem list     Review of Systems   Constitutional: Positive for chills and fever  Negative for appetite change and fatigue  HENT: Positive for rhinorrhea and sinus pain  Negative for sore throat  Respiratory: Positive for cough and shortness of breath  Negative for chest tightness  Cardiovascular: Negative for chest pain  Gastrointestinal: Negative for diarrhea, nausea and vomiting  Musculoskeletal: Positive for arthralgias and myalgias           Objective:      /80 (BP Location: Left arm, Patient Position: Sitting, Cuff Size: Standard)   Pulse (!) 133   Temp (!) 103 °F (39 4 °C)   Resp 12   Ht 5' 8" (1 727 m)   Wt 107 kg (235 lb)   SpO2 97%   BMI 35 73 kg/m²     Allergies   Allergen Reactions   • Penicillins Hives          Current Outpatient Medications:   •  ciprofloxacin (CIPRO) 500 mg tablet, Take 1 tablet (500 mg total) by mouth every 12 (twelve) hours for 5 days, Disp: 10 tablet, Rfl: 0  •  albuterol (PROVENTIL HFA,VENTOLIN HFA) 90 mcg/act inhaler, INHALE 2 PUFFS BY MOUTH EVERY 6 HOURS AS NEEDED FOR WHEEZING, Disp: 25 5 g, Rfl: 0  •  benzonatate (TESSALON) 200 MG capsule, Take 1 capsule (200 mg total) by mouth 3 (three) times a day as needed for cough, Disp: 30 capsule, Rfl: 0  •  cholecalciferol (VITAMIN D3) 1,000 units tablet, Take 1 tablet (1,000 Units total) by mouth daily Start after done with the high-dose , Disp: 90 tablet, Rfl: 3  •  hyoscyamine (Levsin) 0 125 MG tablet, Take 1 tablet (0 125 mg total) by mouth 3 (three) times a day before meals, Disp: 90 tablet, Rfl: 2  •  loperamide (IMODIUM) 2 mg capsule, Take 1 capsule (2 mg total) by mouth 4 (four) times a day as needed for diarrhea (Patient not taking: No sig reported), Disp: 90 capsule, Rfl: 0  •  meclizine (ANTIVERT) 25 mg tablet, Take 1 tablet (25 mg total) by mouth every 8 (eight) hours (Patient not taking: No sig reported), Disp: 15 tablet, Rfl: 0  •  montelukast (SINGULAIR) 10 mg tablet, TAKE 1 TABLET(10 MG) BY MOUTH DAILY AT BEDTIME, Disp: 90 tablet, Rfl: 0  •  multivitamin (THERAGRAN) TABS, Take 1 tablet by mouth daily, Disp: , Rfl:   •  pantoprazole (PROTONIX) 40 mg tablet, TAKE 1 TABLET(40 MG) BY MOUTH DAILY, Disp: 90 tablet, Rfl: 3  •  Pulmicort Flexhaler 180 MCG/ACT inhaler, INHALE 2 PUFFS BY MOUTH TWICE DAILY   RINSE MOUTH AFTER USE (Patient not taking: No sig reported), Disp: 1 each, Rfl: 0  •  rosuvastatin (CRESTOR) 20 MG tablet, Take 1 tablet (20 mg total) by mouth daily, Disp: 90 tablet, Rfl: 2  •  traZODone (DESYREL) 100 mg tablet, TAKE 2 TABLETS(200 MG) BY MOUTH DAILY AT BEDTIME, Disp: 90 tablet, Rfl: 1     There are no Patient Instructions on file for this visit  Physical Exam  Constitutional:       General: He is not in acute distress  Appearance: He is ill-appearing  He is not toxic-appearing  Cardiovascular:      Heart sounds: No murmur heard  No gallop  Pulmonary:      Effort: No respiratory distress  Breath sounds: No wheezing or rales  Neurological:      Mental Status: He is alert

## 2022-12-06 NOTE — ASSESSMENT & PLAN NOTE
Looks like patient's symptoms got worse, concern for pneumonia  Most likely viral etiology but will do CBC and procalcitonin  Will start empirically on ciprofloxacin 500 mg twice daily  He is allergic to penicillins and tetracyclines  I also will send him for x-ray due to high suspicion for pneumonia    If his symptoms will not start to improve within the next 24 hours I will send him to the hospital

## 2022-12-06 NOTE — LETTER
December 6, 2022     Patient: Sampson Rice  YOB: 1995  Date of Visit: 12/6/2022      To Whom it May Concern:    Halliemaira Rudy is under my professional care  Sukhi Bray was seen in my office on 12/6/2022  Sukhi Bray will be excused from work starting from 12/5/2022 until 12/9/2022  If you have any questions or concerns, please don't hesitate to call           Sincerely,          Gonzalez German MD        CC: No Recipients

## 2022-12-07 ENCOUNTER — TELEPHONE (OUTPATIENT)
Dept: FAMILY MEDICINE CLINIC | Facility: CLINIC | Age: 27
End: 2022-12-07

## 2022-12-07 ENCOUNTER — HOSPITAL ENCOUNTER (EMERGENCY)
Facility: HOSPITAL | Age: 27
Discharge: HOME | End: 2022-12-07
Attending: EMERGENCY MEDICINE

## 2022-12-07 VITALS
TEMPERATURE: 99.3 F | SYSTOLIC BLOOD PRESSURE: 170 MMHG | DIASTOLIC BLOOD PRESSURE: 79 MMHG | HEIGHT: 68 IN | WEIGHT: 233.69 LBS | OXYGEN SATURATION: 95 % | BODY MASS INDEX: 35.42 KG/M2 | HEART RATE: 100 BPM | RESPIRATION RATE: 18 BRPM

## 2022-12-07 DIAGNOSIS — R68.89 INFLUENZA-LIKE SYMPTOMS: Primary | ICD-10-CM

## 2022-12-07 LAB
FLUAV RNA RESP QL NAA+PROBE: POSITIVE
FLUBV RNA RESP QL NAA+PROBE: NEGATIVE
RSV RNA RESP QL NAA+PROBE: NEGATIVE
SARS-COV-2 RNA RESP QL NAA+PROBE: NEGATIVE

## 2022-12-07 RX ORDER — IBUPROFEN 400 MG/1
400 TABLET ORAL ONCE
Status: COMPLETED | OUTPATIENT
Start: 2022-12-07 | End: 2022-12-07

## 2022-12-07 RX ADMIN — IBUPROFEN 400 MG: 400 TABLET, FILM COATED ORAL at 20:47

## 2022-12-07 NOTE — TELEPHONE ENCOUNTER
Pt called in stating he was returning a call from our office regarding his lab results  I relayed Dr Condon Samples result note to him

## 2022-12-08 NOTE — DISCHARGE INSTRUCTIONS
Please return to the ED for any concerns as outlined in the AVS or for any other concerns  Please follow-up with your primary care provider in 2 days for re-evaluation and further management  Continue ibuprofen or acetaminophen as needed for pain/fever control  Continue to drink clear liquids to stay adequately hydrated

## 2022-12-08 NOTE — ED PROVIDER NOTES
History  Chief Complaint   Patient presents with   • Fever - 9 weeks to 74 years     Fever for several days, fatigue, b/l flank pain  Tylenol this morning  Patient is a 60-year-old male with no reported significant past medical history presents ED with complaint of fever, cough, nasal congestion, rhinorrhea, sore throat, headache and body aches  Patient reports began with the symptoms about 4 days ago  Reports that fevers have been waxing and waning  Highest reported temperature at home today was 100 4 F with no medications PTA  States he was seen at his primary care provider yesterday where he had a negative COVID test, negative urinalysis and was started on ciprofloxacin for concern for pneumonia  States has been taking abx as prescribed  Patient reports his cough is mixed dry and wet  He denies any sputum production, shortness of breath, dyspnea, increased work of breathing, respiratory distress, or chest pain  (+) Clear rhinorrhea  Sore throat but no globus sensation, voice change or inability to maintain oral secretions  Generalized body aches but no focal weakness  (+) Headache which she describes as bilateral and generalized without radiation  He denies worst headache of his life, sudden onset and focal deficits  Denies any recent sick contacts or travel outside the [address]  Patient reports he did have COVID last year and the symptoms feel similar  Denies being vaccinated as COVID  Reports he is otherwise been well and denies any other complaints today  Specifically denies abdominal pain, nausea, vomiting, diarrhea, dysuria, hematuria, increased urinary frequency, urgency, recent injury/trauma to the back, saddle anesthesia, urinary incontinence, bowel incontinence, urinary retention, LE weakness, LE anesthesia, IVDU, history of cancer, unintentional weight loss, prolonged corticosteroid use, osteoporosis, dialysis, immunocompromised and recent spinal surgery             Prior to Admission Medications   Prescriptions Last Dose Informant Patient Reported? Taking? Pulmicort Flexhaler 180 MCG/ACT inhaler   No No   Sig: INHALE 2 PUFFS BY MOUTH TWICE DAILY  RINSE MOUTH AFTER USE   Patient not taking: No sig reported   albuterol (PROVENTIL HFA,VENTOLIN HFA) 90 mcg/act inhaler   No No   Sig: INHALE 2 PUFFS BY MOUTH EVERY 6 HOURS AS NEEDED FOR WHEEZING   benzonatate (TESSALON) 200 MG capsule   No No   Sig: Take 1 capsule (200 mg total) by mouth 3 (three) times a day as needed for cough   cholecalciferol (VITAMIN D3) 1,000 units tablet   No No   Sig: Take 1 tablet (1,000 Units total) by mouth daily Start after done with the high-dose     ciprofloxacin (CIPRO) 500 mg tablet   No No   Sig: Take 1 tablet (500 mg total) by mouth every 12 (twelve) hours for 5 days   hyoscyamine (Levsin) 0 125 MG tablet   No No   Sig: Take 1 tablet (0 125 mg total) by mouth 3 (three) times a day before meals   loperamide (IMODIUM) 2 mg capsule   No No   Sig: Take 1 capsule (2 mg total) by mouth 4 (four) times a day as needed for diarrhea   Patient not taking: No sig reported   meclizine (ANTIVERT) 25 mg tablet   No No   Sig: Take 1 tablet (25 mg total) by mouth every 8 (eight) hours   Patient not taking: No sig reported   montelukast (SINGULAIR) 10 mg tablet   No No   Sig: TAKE 1 TABLET(10 MG) BY MOUTH DAILY AT BEDTIME   multivitamin (THERAGRAN) TABS   Yes No   Sig: Take 1 tablet by mouth daily   pantoprazole (PROTONIX) 40 mg tablet   No No   Sig: TAKE 1 TABLET(40 MG) BY MOUTH DAILY   rosuvastatin (CRESTOR) 20 MG tablet   No No   Sig: Take 1 tablet (20 mg total) by mouth daily   traZODone (DESYREL) 100 mg tablet   No No   Sig: TAKE 2 TABLETS(200 MG) BY MOUTH DAILY AT BEDTIME      Facility-Administered Medications: None       Past Medical History:   Diagnosis Date   • Anxiety    • Hypertension        Past Surgical History:   Procedure Laterality Date   • TONSILLECTOMY         Family History   Problem Relation Age of Onset   • Diabetes Mother    • Hypertension Mother    • Anxiety disorder Mother    • Heart attack Father    • Hypertension Father    • Anxiety disorder Father      I have reviewed and agree with the history as documented  E-Cigarette/Vaping   • E-Cigarette Use Never User      E-Cigarette/Vaping Substances     Social History     Tobacco Use   • Smoking status: Never   • Smokeless tobacco: Never   Vaping Use   • Vaping Use: Never used   Substance Use Topics   • Alcohol use: Yes     Comment: holiday/special occassion   • Drug use: Never       Review of Systems   Constitutional: Positive for fatigue and fever  Negative for chills and diaphoresis  HENT: Positive for congestion, rhinorrhea and sore throat  Negative for ear pain, trouble swallowing and voice change  Eyes: Negative for pain and visual disturbance  Respiratory: Positive for cough  Negative for shortness of breath and wheezing  Cardiovascular: Negative for chest pain, palpitations and leg swelling  Gastrointestinal: Negative for abdominal pain, constipation, diarrhea, nausea and vomiting  Genitourinary: Negative for difficulty urinating, dysuria, frequency and hematuria  Musculoskeletal: Positive for back pain  Negative for arthralgias  (+) Body aches    Skin: Negative for color change and rash  Neurological: Positive for headaches  Negative for seizures, syncope, weakness and light-headedness  Psychiatric/Behavioral: Negative for confusion  All other systems reviewed and are negative  Physical Exam  Physical Exam  Vitals and nursing note reviewed  Constitutional:       General: He is not in acute distress  Appearance: He is well-developed  He is not ill-appearing or toxic-appearing  HENT:      Head: Normocephalic and atraumatic        Right Ear: Tympanic membrane, ear canal and external ear normal       Left Ear: Tympanic membrane, ear canal and external ear normal       Nose: Nose normal       Mouth/Throat:      Comments: Oropharynx patent and clear  No erythema, exudates, swelling, lesions or sores  Patient maintaining oral secretions without issue  Normal phonation  Eyes:      Conjunctiva/sclera: Conjunctivae normal    Neck:      Meningeal: Brudzinski's sign and Kernig's sign absent  Cardiovascular:      Rate and Rhythm: Normal rate and regular rhythm  Heart sounds: No murmur heard  Pulmonary:      Effort: Pulmonary effort is normal  No respiratory distress  Breath sounds: Normal breath sounds  Comments: Clear breath sounds auscultated throughout all lung fields bilaterally  Adequate air movement  No wheeze, rhonchi, rales, increased WOB, retractions, accessory muscle use or respiratory distress  O2 sat- 95 % on RA  Chest:      Chest wall: No tenderness  Abdominal:      Palpations: Abdomen is soft  Tenderness: There is no abdominal tenderness  There is no right CVA tenderness or left CVA tenderness  Genitourinary:     Comments: Deferred  Musculoskeletal:         General: No swelling  Cervical back: Neck supple  No rigidity  Right lower leg: No edema  Left lower leg: No edema  Comments: Normal muscle tone moving all extremities without issue  No midline or paraspinal TTP over the C/T/L-spine  No step-offs, deformity or overlying skin changes  (-) CVA TTP B/L  Sensation and motor intact in B/L LE  Strength 5/5 in B/L LE  B/L PT pulses 2+  B/L LE are warm and well-perfused  (-) SLR B/L  Skin:     General: Skin is warm and dry  Capillary Refill: Capillary refill takes less than 2 seconds  Findings: No rash  Neurological:      General: No focal deficit present  Mental Status: He is alert  GCS: GCS eye subscore is 4  GCS verbal subscore is 5  GCS motor subscore is 6  Cranial Nerves: Cranial nerves 2-12 are intact  Sensory: Sensation is intact  Motor: Motor function is intact  Coordination: Coordination is intact        Gait: Gait is intact  Deep Tendon Reflexes: Reflexes normal    Psychiatric:         Mood and Affect: Mood normal          Vital Signs  ED Triage Vitals [12/07/22 1848]   Temperature Pulse Respirations Blood Pressure SpO2   99 3 °F (37 4 °C) 100 18 170/79 95 %      Temp Source Heart Rate Source Patient Position - Orthostatic VS BP Location FiO2 (%)   Oral Monitor Sitting Right arm --      Pain Score       7           Vitals:    12/07/22 1848   BP: 170/79   Pulse: 100   Patient Position - Orthostatic VS: Sitting         Visual Acuity      ED Medications  Medications   ibuprofen (MOTRIN) tablet 400 mg (400 mg Oral Given 12/7/22 2047)       Diagnostic Studies  Results Reviewed     Procedure Component Value Units Date/Time    FLU/RSV/COVID - if FLU/RSV clinically relevant [[id number]]  (Abnormal) Collected: 12/07/22 2033    Lab Status: Final result Specimen: Nares from Nose Updated: 12/07/22 2116     SARS-CoV-2 Negative     INFLUENZA A PCR Positive     INFLUENZA B PCR Negative     RSV PCR Negative    Narrative:      FOR PEDIATRIC PATIENTS - copy/paste COVID Guidelines URL to browser: [email or web address]  [employer]    SARS-CoV-2 assay is a Nucleic Acid Amplification assay intended for the  qualitative detection of nucleic acid from SARS-CoV-2 in nasopharyngeal  swabs  Results are for the presumptive identification of SARS-CoV-2 RNA  Positive results are indicative of infection with SARS-CoV-2, the virus  causing COVID-19, but do not rule out bacterial infection or co-infection  with other viruses  Laboratories within the [place] and its  territories are required to report all positive results to the appropriate  public health authorities  Negative results do not preclude SARS-CoV-2  infection and should not be used as the sole basis for treatment or other  patient management decisions   Negative results must be combined with  clinical observations, patient history, and epidemiological information  This test has not been FDA cleared or approved  This test has been authorized by FDA under an Emergency Use Authorization  (EUA)  This test is only authorized for the duration of time the  declaration that circumstances exist justifying the authorization of the  emergency use of an in vitro diagnostic tests for detection of SARS-CoV-2  virus and/or diagnosis of COVID-19 infection under section 564(b)(1) of  the Act, 21 U  S C  305GPB-1(N)(8), unless the authorization is terminated  or revoked sooner  The test has been validated but independent review by FDA  and CLIA is pending  Test performed using [employer] Gene[employer]pert: This RT-PCR assay targets N2,  a region unique to SARS-CoV-2  A conserved region in the E-gene was chosen  for pan-Sarbecovirus detection which includes SARS-CoV-2  According to CMS-2020-01-R, this platform meets the definition of high-throughput technology  No orders to display              Procedures  Procedures         ED Course                                             MDM  Number of Diagnoses or Management Options     Amount and/or Complexity of Data Reviewed  Clinical lab tests: ordered    Risk of Complications, Morbidity, and/or Mortality  General comments: Patient presented to the ED with complaints of fever, cough, body aches including back pain, nasal congestion, sore throat, and headache for the last few days  Was seen at his PCP yesterday where there was concern for pneumonia and he was started on ciprofloxacin  Had a negative COVID test   Per chart review patient with no acute findings on his chest x-ray from yesterday  CBC with no elevated WBC and otherwise WNL  UA was obtained with no signs of infection  VSS today  Patient is afebrile and otherwise normal vital signs  No acute findings on physical exam   Patient is a well-appearing 31-year-old male resting in the stretcher in no acute distress    DDx including but not limited to: viral illness, URI, COVID, flu, RSV  Doubt pneumonia, OM, pharyngitis, cellulitis, UTI, meningitis, sinusitis  Centor-0  As such we will defer further testing or treatment at this time  Will test for COVID/flu/RSV light of patient's complaints  Will provide ibuprofen in light of body aches  No back pain red flags on history or physical exam   As such we will defer further imaging at this time  Will defer UA as patient had a normal UA yesterday  No abdominal TTP on exam   In light of improvement of fever with no acute findings on PE today this is likely a viral illness  Patient follow-up with his PCP in 2 days for reevaluation and further management  Strict return precautions verbally communicated to the patient as outlined in the AVS   All patient questions and concerns were answered  Patient verbally communicated their understanding and agreement to the above plan  Stable at discharge  Patient Progress  Patient progress: stable      Disposition  Final diagnoses:   Influenza-like symptoms     Time reflects when diagnosis was documented in both MDM as applicable and the Disposition within this note     Time User Action Codes Description Comment    12/7/2022  8:45 PM Tk Cisneros Add [R68 89] Influenza-like symptoms       ED Disposition     ED Disposition   Discharge    Condition   Stable    Date/Time   Wed Dec 7, 2022  8:45 PM    Comment   [address] [facility] discharge to home/self care                 Follow-up Information     Follow up With Specialties Details Why Contact Info Additional [address] [facility] Emergency Department Emergency Medicine Go to  As needed, If symptoms worsen [place] [id number] [address] [facility] Emergency Department, [address] [place] [address], [id number]          Discharge Medication List as of 12/7/2022  8:46 PM      CONTINUE these medications which have NOT CHANGED    Details albuterol (PROVENTIL HFA,VENTOLIN HFA) 90 mcg/act inhaler INHALE 2 PUFFS BY MOUTH EVERY 6 HOURS AS NEEDED FOR WHEEZING, Normal      benzonatate (TESSALON) 200 MG capsule Take 1 capsule (200 mg total) by mouth 3 (three) times a day as needed for cough, Starting Tue 11/22/2022, Normal      cholecalciferol (VITAMIN D3) 1,000 units tablet Take 1 tablet (1,000 Units total) by mouth daily Start after done with the high-dose , Starting Mon 5/17/2021, Until Thu 10/13/2022, Normal      ciprofloxacin (CIPRO) 500 mg tablet Take 1 tablet (500 mg total) by mouth every 12 (twelve) hours for 5 days, Starting Tue 12/6/2022, Until Sun 12/11/2022, Normal      hyoscyamine (Levsin) 0 125 MG tablet Take 1 tablet (0 125 mg total) by mouth 3 (three) times a day before meals, Starting Thu 10/13/2022, Normal      loperamide (IMODIUM) 2 mg capsule Take 1 capsule (2 mg total) by mouth 4 (four) times a day as needed for diarrhea, Starting Fri 7/15/2022, Normal      meclizine (ANTIVERT) 25 mg tablet Take 1 tablet (25 mg total) by mouth every 8 (eight) hours, Starting Fri 5/28/2021, Normal      montelukast (SINGULAIR) 10 mg tablet TAKE 1 TABLET(10 MG) BY MOUTH DAILY AT BEDTIME, Normal      multivitamin (THERAGRAN) TABS Take 1 tablet by mouth daily, Historical Med      pantoprazole (PROTONIX) 40 mg tablet TAKE 1 TABLET(40 MG) BY MOUTH DAILY, Normal      Pulmicort Flexhaler 180 MCG/ACT inhaler INHALE 2 PUFFS BY MOUTH TWICE DAILY  RINSE MOUTH AFTER USE, Normal      rosuvastatin (CRESTOR) 20 MG tablet Take 1 tablet (20 mg total) by mouth daily, Starting Thu 7/28/2022, Normal      traZODone (DESYREL) 100 mg tablet TAKE 2 TABLETS(200 MG) BY MOUTH DAILY AT BEDTIME, Normal             No discharge procedures on file      PDMP Review     None          ED Provider  Electronically Signed by           Rico Lewis PA-C  12/08/22 9654

## 2022-12-09 ENCOUNTER — TELEPHONE (OUTPATIENT)
Dept: OTHER | Facility: OTHER | Age: 27
End: 2022-12-09

## 2022-12-09 NOTE — TELEPHONE ENCOUNTER
Patient called to request a return to work note from Dr Anton Kessler  Note needs to state that patient is cleared to return to work without restrictions   Note needs to be faxed to: [phone], Attn: HR

## 2022-12-12 NOTE — TELEPHONE ENCOUNTER
Patient called to follow up about RTW note  Patient would like to be excused until Wednesday 12/14/22, with return to work without restriction on that day  Please advise if note is OK

## 2022-12-13 ENCOUNTER — TELEMEDICINE (OUTPATIENT)
Dept: FAMILY MEDICINE CLINIC | Facility: CLINIC | Age: 27
End: 2022-12-13

## 2022-12-13 DIAGNOSIS — J40 BRONCHITIS: Primary | ICD-10-CM

## 2022-12-13 NOTE — PROGRESS NOTES
Virtual Regular Visit    Verification of patient location:    Patient is located in the following state in which I hold an active license [place]      Assessment/Plan:    Problem List Items Addressed This Visit        Respiratory    Bronchitis - Primary            Reason for visit is No chief complaint on file  Encounter provider Ofelia Kauffman MD    Provider located at [place] [address] [facility]  [address] [id number] [phone]      Recent Visits  Date Type Provider Dept   12/07/22 Telephone MD [facility]   12/06/22 Office Visit MD [facility]   Showing recent visits within past 7 days and meeting all other requirements  Today's Visits  Date Type Provider Dept   12/13/22 Angelica Guerrero MD [facility]   Showing today's visits and meeting all other requirements  Future Appointments  No visits were found meeting these conditions  Showing future appointments within next 150 days and meeting all other requirements       The patient was identified by name and date of birth  Ruth Rao was informed that this is a telemedicine visit and that the visit is being conducted through the [address] [place] [address]  He agrees to proceed     My office door was closed  No one else was in the room  He acknowledged consent and understanding of privacy and security of the video platform  The patient has agreed to participate and understands they can discontinue the visit at any time  Patient is aware this is a billable service  Subjective  Ruth Rao is a 33 y o  male   Patient had a visit today to fill out his FMLA forms  He was recently treated for bronchitis and flu  He is ready to go back to work with no limitations  Forms filled out and sent         Past Medical History:   Diagnosis Date   • Anxiety    • Hypertension        Past Surgical History:   Procedure Laterality Date   • TONSILLECTOMY         Current Outpatient Medications   Medication Sig Dispense Refill   • albuterol (PROVENTIL HFA,VENTOLIN HFA) 90 mcg/act inhaler INHALE 2 PUFFS BY MOUTH EVERY 6 HOURS AS NEEDED FOR WHEEZING 25 5 g 0   • benzonatate (TESSALON) 200 MG capsule Take 1 capsule (200 mg total) by mouth 3 (three) times a day as needed for cough 30 capsule 0   • cholecalciferol (VITAMIN D3) 1,000 units tablet Take 1 tablet (1,000 Units total) by mouth daily Start after done with the high-dose  90 tablet 3   • hyoscyamine (Levsin) 0 125 MG tablet Take 1 tablet (0 125 mg total) by mouth 3 (three) times a day before meals 90 tablet 2   • loperamide (IMODIUM) 2 mg capsule Take 1 capsule (2 mg total) by mouth 4 (four) times a day as needed for diarrhea (Patient not taking: No sig reported) 90 capsule 0   • meclizine (ANTIVERT) 25 mg tablet Take 1 tablet (25 mg total) by mouth every 8 (eight) hours (Patient not taking: No sig reported) 15 tablet 0   • montelukast (SINGULAIR) 10 mg tablet TAKE 1 TABLET(10 MG) BY MOUTH DAILY AT BEDTIME 90 tablet 0   • multivitamin (THERAGRAN) TABS Take 1 tablet by mouth daily     • pantoprazole (PROTONIX) 40 mg tablet TAKE 1 TABLET(40 MG) BY MOUTH DAILY 90 tablet 3   • Pulmicort Flexhaler 180 MCG/ACT inhaler INHALE 2 PUFFS BY MOUTH TWICE DAILY  RINSE MOUTH AFTER USE (Patient not taking: No sig reported) 1 each 0   • rosuvastatin (CRESTOR) 20 MG tablet Take 1 tablet (20 mg total) by mouth daily 90 tablet 2   • traZODone (DESYREL) 100 mg tablet TAKE 2 TABLETS(200 MG) BY MOUTH DAILY AT BEDTIME 90 tablet 1     No current facility-administered medications for this visit  Allergies   Allergen Reactions   • Penicillins Hives       Review of Systems    Video Exam    There were no vitals filed for this visit      Physical Exam     I spent 10 minutes directly with the patient during this visit

## 2022-12-15 ENCOUNTER — OFFICE VISIT (OUTPATIENT)
Dept: GASTROENTEROLOGY | Facility: AMBULARY SURGERY CENTER | Age: 27
End: 2022-12-15

## 2022-12-15 ENCOUNTER — APPOINTMENT (OUTPATIENT)
Dept: LAB | Facility: AMBULARY SURGERY CENTER | Age: 27
End: 2022-12-15

## 2022-12-15 VITALS
DIASTOLIC BLOOD PRESSURE: 80 MMHG | SYSTOLIC BLOOD PRESSURE: 122 MMHG | OXYGEN SATURATION: 98 % | BODY MASS INDEX: 35.95 KG/M2 | WEIGHT: 237.2 LBS | HEART RATE: 91 BPM | HEIGHT: 68 IN

## 2022-12-15 DIAGNOSIS — E88.81 METABOLIC SYNDROME: ICD-10-CM

## 2022-12-15 DIAGNOSIS — E66.9 OBESITY (BMI 30-39.9): ICD-10-CM

## 2022-12-15 DIAGNOSIS — K76.0 HEPATIC STEATOSIS: Primary | ICD-10-CM

## 2022-12-15 DIAGNOSIS — R74.8 ELEVATED LIVER ENZYMES: ICD-10-CM

## 2022-12-15 DIAGNOSIS — K76.0 HEPATIC STEATOSIS: ICD-10-CM

## 2022-12-15 LAB
ALBUMIN SERPL BCP-MCNC: 4.2 G/DL (ref 3.5–5)
ALP SERPL-CCNC: 83 U/L (ref 46–116)
ALT SERPL W P-5'-P-CCNC: 52 U/L (ref 12–78)
ANION GAP SERPL CALCULATED.3IONS-SCNC: 5 MMOL/L (ref 4–13)
AST SERPL W P-5'-P-CCNC: 16 U/L (ref 5–45)
BASOPHILS # BLD AUTO: 0.03 THOUSANDS/ÂΜL (ref 0–0.1)
BASOPHILS NFR BLD AUTO: 1 % (ref 0–1)
BILIRUB SERPL-MCNC: 0.57 MG/DL (ref 0.2–1)
BUN SERPL-MCNC: 12 MG/DL (ref 5–25)
CALCIUM SERPL-MCNC: 9.3 MG/DL (ref 8.3–10.1)
CHLORIDE SERPL-SCNC: 107 MMOL/L (ref 96–108)
CHOLEST SERPL-MCNC: 192 MG/DL
CO2 SERPL-SCNC: 27 MMOL/L (ref 21–32)
CREAT SERPL-MCNC: 0.85 MG/DL (ref 0.6–1.3)
EOSINOPHIL # BLD AUTO: 0.1 THOUSAND/ÂΜL (ref 0–0.61)
EOSINOPHIL NFR BLD AUTO: 2 % (ref 0–6)
ERYTHROCYTE [DISTWIDTH] IN BLOOD BY AUTOMATED COUNT: 11.7 % (ref 11.6–15.1)
EST. AVERAGE GLUCOSE BLD GHB EST-MCNC: 94 MG/DL
FERRITIN SERPL-MCNC: 397 NG/ML (ref 8–388)
GFR SERPL CREATININE-BSD FRML MDRD: 120 ML/MIN/1.73SQ M
GLUCOSE P FAST SERPL-MCNC: 103 MG/DL (ref 65–99)
HAV AB SER QL IA: NORMAL
HBA1C MFR BLD: 4.9 %
HBV CORE AB SER QL: NORMAL
HBV CORE IGM SER QL: NORMAL
HBV SURFACE AB SER-ACNC: <3.1 MIU/ML
HBV SURFACE AG SER QL: NORMAL
HCT VFR BLD AUTO: 49 % (ref 36.5–49.3)
HCV AB SER QL: NORMAL
HDLC SERPL-MCNC: 29 MG/DL
HGB BLD-MCNC: 16.6 G/DL (ref 12–17)
IGA SERPL-MCNC: 149 MG/DL (ref 70–400)
IGG SERPL-MCNC: 790 MG/DL (ref 700–1600)
IGM SERPL-MCNC: 103 MG/DL (ref 40–230)
IMM GRANULOCYTES # BLD AUTO: 0.02 THOUSAND/UL (ref 0–0.2)
IMM GRANULOCYTES NFR BLD AUTO: 0 % (ref 0–2)
IRON SATN MFR SERPL: 38 % (ref 20–50)
IRON SERPL-MCNC: 107 UG/DL (ref 65–175)
LYMPHOCYTES # BLD AUTO: 2.23 THOUSANDS/ÂΜL (ref 0.6–4.47)
LYMPHOCYTES NFR BLD AUTO: 42 % (ref 14–44)
MCH RBC QN AUTO: 30.7 PG (ref 26.8–34.3)
MCHC RBC AUTO-ENTMCNC: 33.9 G/DL (ref 31.4–37.4)
MCV RBC AUTO: 91 FL (ref 82–98)
MONOCYTES # BLD AUTO: 0.35 THOUSAND/ÂΜL (ref 0.17–1.22)
MONOCYTES NFR BLD AUTO: 7 % (ref 4–12)
NEUTROPHILS # BLD AUTO: 2.59 THOUSANDS/ÂΜL (ref 1.85–7.62)
NEUTS SEG NFR BLD AUTO: 48 % (ref 43–75)
NONHDLC SERPL-MCNC: 163 MG/DL
NRBC BLD AUTO-RTO: 0 /100 WBCS
PLATELET # BLD AUTO: 308 THOUSANDS/UL (ref 149–390)
PMV BLD AUTO: 10.1 FL (ref 8.9–12.7)
POTASSIUM SERPL-SCNC: 4 MMOL/L (ref 3.5–5.3)
PROT SERPL-MCNC: 7.7 G/DL (ref 6.4–8.4)
RBC # BLD AUTO: 5.4 MILLION/UL (ref 3.88–5.62)
SODIUM SERPL-SCNC: 139 MMOL/L (ref 135–147)
TIBC SERPL-MCNC: 285 UG/DL (ref 250–450)
TRIGL SERPL-MCNC: 729 MG/DL
TSH SERPL DL<=0.05 MIU/L-ACNC: 0.91 UIU/ML (ref 0.45–4.5)
WBC # BLD AUTO: 5.32 THOUSAND/UL (ref 4.31–10.16)

## 2022-12-15 NOTE — PROGRESS NOTES
[facility] - Outpatient Consultation  Abelardo Saini 33 y o  male MRN: [id number]  Encounter: [id number]    PCP:  Jocelyn Casey MD, [phone]  Referring Provider:  No ref  provider found,     Patient: Abelardo Saini, 1995  Reason for Referral: abnormal liver tests    ASSESSMENT/PLAN:  33 y o  male with history of HTN, dyslipidemia, class II obesity and hepatic steatosis who presents for abnormal liver tests  He has had abnormal liver tests dating back to May 2021 with moderate elevations in serum transaminases  He had an ultrasound in August 2021 which showed mild hepatomegaly with hepatic steatosis  Etiology was felt to be due to NAFLD given his metabolic risk factors although he has not completed his serologic work-up  I advised that he should complete his labs and schedule an ultrasound elastography to complete staging, although suspicion for advanced liver disease is low  I also discussed the importance of weight loss and offered a nutrition consult to help with dietary counseling, but he declined  We will follow-up on his labs to determine if a referral weight loss clinic is warranted in the event he is unsuccessful with lifestyle modifications alone  He should avoid interval weight gain, excessive alcohol consumption, and hepatotoxic medications  He will follow-up in 3 months or sooner  Thank you for the opportunity to consult in his care      - CBC, CMP and INR  - TSH, A1c and lipid panel   - ALEXEI, ASMA, AMA, and quantitative immunoglobulins  - Iron studies with ferritin  - Ceruloplasmin  - HAV IgG, HBsAg, HBcAb, HBsAb and HCV Ab; vaccinate if non-immune to HAV and HBV  - US elastography    Adrianna Shah MD  Division of Gastroenterology and Hepatology  [address] [place] [address] Floor    ============================================================================  CC/HPI: 33 y o  male with history of HTN, dyslipidemia, class II obesity and hepatic steatosis who presents for abnormal liver tests  He has had abnormal liver tests dating back to May 2021 with moderate elevations in serum transaminases  He had an ultrasound in August 2021 which showed mild hepatomegaly with hepatic steatosis  Etiology was felt to be due to NAFLD given his metabolic risk factors although he has not completed his serologic work-up  He has been working to lose weight and reports walking 2 miles daily  He has cut down on his caloric intake but has not noticed a change in his weight  He reports occasional EtOH during the holidays but nothing in excess  ROS: Complete review of systems otherwise negative       PAST MEDICAL/SURGICAL HISTORY:  Past Medical History:   Diagnosis Date   • Anxiety    • Fatty liver    • Hypertension         Past Surgical History:   Procedure Laterality Date   • TONSILLECTOMY         FAMILY/SOCIAL HISTORY:  Family History   Problem Relation Age of Onset   • Diabetes Mother    • Hypertension Mother    • Anxiety disorder Mother    • Heart attack Father    • Hypertension Father    • Anxiety disorder Father        Social History     Tobacco Use   • Smoking status: Never   • Smokeless tobacco: Never   Vaping Use   • Vaping Use: Never used   Substance Use Topics   • Alcohol use: Not Currently     Comment: holiday/special occassion   • Drug use: Never       MEDICATIONS:  Current Outpatient Medications on File Prior to Visit   Medication Sig Dispense Refill   • albuterol (PROVENTIL HFA,VENTOLIN HFA) 90 mcg/act inhaler INHALE 2 PUFFS BY MOUTH EVERY 6 HOURS AS NEEDED FOR WHEEZING 25 5 g 0   • multivitamin (THERAGRAN) TABS Take 1 tablet by mouth daily     • pantoprazole (PROTONIX) 40 mg tablet TAKE 1 TABLET(40 MG) BY MOUTH DAILY 90 tablet 3   • rosuvastatin (CRESTOR) 20 MG tablet Take 1 tablet (20 mg total) by mouth daily 90 tablet 2   • traZODone (DESYREL) 100 mg tablet TAKE 2 TABLETS(200 MG) BY MOUTH DAILY AT BEDTIME 90 tablet 1   • benzonatate (TESSALON) 200 MG capsule Take 1 capsule (200 mg total) by mouth 3 (three) times a day as needed for cough (Patient not taking: Reported on 12/15/2022) 30 capsule 0   • cholecalciferol (VITAMIN D3) 1,000 units tablet Take 1 tablet (1,000 Units total) by mouth daily Start after done with the high-dose  90 tablet 3   • hyoscyamine (Levsin) 0 125 MG tablet Take 1 tablet (0 125 mg total) by mouth 3 (three) times a day before meals (Patient not taking: Reported on 12/15/2022) 90 tablet 2   • loperamide (IMODIUM) 2 mg capsule Take 1 capsule (2 mg total) by mouth 4 (four) times a day as needed for diarrhea (Patient not taking: Reported on 10/13/2022) 90 capsule 0   • meclizine (ANTIVERT) 25 mg tablet Take 1 tablet (25 mg total) by mouth every 8 (eight) hours (Patient not taking: Reported on 7/15/2022) 15 tablet 0   • montelukast (SINGULAIR) 10 mg tablet TAKE 1 TABLET(10 MG) BY MOUTH DAILY AT BEDTIME (Patient not taking: Reported on 12/15/2022) 90 tablet 0   • Pulmicort Flexhaler 180 MCG/ACT inhaler INHALE 2 PUFFS BY MOUTH TWICE DAILY  RINSE MOUTH AFTER USE (Patient not taking: Reported on 7/15/2022) 1 each 0     No current facility-administered medications on file prior to visit         Allergies   Allergen Reactions   • Penicillins Hives       PHYSICAL EXAM:  /80   Pulse 91   Ht 5' 8" (1 727 m)   Wt 108 kg (237 lb 3 2 oz)   SpO2 98%   BMI 36 07 kg/m²   GENERAL: NAD, AAO  HEENT: anicteric, OP clear, MMM  ABDOMEN: S/ND/NT, normoactive BS, no hepatomegaly, spleen not palpable  EXTREMITIES: no edema  SKIN: no rashes, no palmar erythema, no spider angiomata   NEURO: normal gait, no tremor, no asterixis     LABS/RADIOLOGY/ENDOSCOPY:  Lab Results   Component Value Date    WBC 5 58 12/06/2022    HGB 15 5 12/06/2022    HCT 47 8 12/06/2022     12/06/2022    GLUF 96 05/10/2021    BUN 15 08/10/2021    CREATININE 0 90 08/10/2021    K 4 1 08/10/2021     08/10/2021    CO2 26 08/10/2021    ALKPHOS 84 08/10/2021     (H) 08/10/2021 AST 35 08/10/2021    CALCIUM 9 2 08/10/2021    EGFR 118 08/10/2021    TRIG 524 (H) 07/28/2021    HDL 35 (L) 07/28/2021     RUQ US (8/2021)  Hepatomegaly   Hepatic marked steatosis  Focal fatty sparing    Computed MELD-Na score unavailable  Necessary lab results were not found in the last year  Computed MELD score unavailable  Necessary lab results were not found in the last year

## 2022-12-15 NOTE — PATIENT INSTRUCTIONS
For patients with fatty liver disease, weight loss through diet and exercise is recommended  To MAINTAIN weight you must use up at least as many calories as you take in  To LOSE weight you must use up more calories than you take in  Start by knowing how many calories you should be eating and drinking to maintain your weight  Nutrition and calorie information on food labels is typically based on a 2,000 calorie diet  You may need fewer or more calories depending on several factors including age, gender, and level of physical activity  Increase the amount and intensity of your physical activity to match the number of calories you take in  Aim for at least 150 minutes of moderate physical activity or 75 minutes of vigorous physical activity - or an equal combination of both - each week  Regular physical activity can help you maintain your weight, keep off weight that you lose and help you reach physical and cardiovascular fitness  If it's hard to schedule regular exercise sessions, try aiming for sessions of at least 10 minutes spread throughout the week  As you make daily food choices, base your eating pattern on these recommendations:   Eat a variety of fresh, frozen and canned vegetables and fruits without high-calorie sauces or added salt and sugars  Replace high-calorie foods with fruits and vegetables  Choose fiber-rich whole grains for most grain servings  Choose poultry and fish without skin and prepare them in healthy ways without added saturated and trans fat  If you choose to eat meat, look for the leanest cuts available and prepare them in healthy and delicious ways  Eat a variety of fish at least twice a week, especially fish containing omega-3 fatty acids (for example, salmon, trout and herring)  Select fat-free (skim) and low-fat (1%) dairy products  Avoid foods containing partially hydrogenated vegetable oils to reduce trans fat in your diet     Limit saturated fat and trans fat and replace them with the better fats, monounsaturated and polyunsaturated  If you need to lower your blood cholesterol, reduce saturated fat to no more than 5 to 6 percent of total calories  For someone eating 2,000 calories a day, that's about 13 grams of saturated fat  Cut back on beverages and foods with added sugars  Choose foods with less sodium and prepare foods with little or no salt  To prevent fluid problems in the legs and abdomen aim to eat no more than 2,000 milligrams of sodium per day  If you can't meet these goals right now, even reducing sodium intake by 1,000 mg per day can benefit blood pressure  If you drink alcohol, drink in moderation  That means no more than one drink per day if you're a woman and no more than two drinks per day if you're a man  Please note that the effect of alcohol on NAFLD is unclear  Some studies show that moderate alcohol use may be beneficial, but others show no benefit and even harm  Discuss specific recommendations with your hepatologist    Studies have shown that drinking up to 4 cups of caffeinated coffee per day may reduce progression of liver disease and development of liver cancer  If you have cirrhosis of the liver avoid raw shellfish such as oysters and clams  These can carry a specific bacteria that can be very harmful in liver disease    For more information on Fatty Liver Disease and Prevention please see this video from the [address]: [place]     For more information on healthy eating, please visit the American Heart Association website: Daniel dickson     For tips on physical activity, please visit the American Heart Association website:   [employer]     If you are looking for additional local support, education or are ready to take action to end liver disease, contact the [employer]  The intermediate is the nation's leading nonprofit focusing on providing high-quality education and support services for the prevention, treatment and cure of over 100 liver diseases  You can learn more by visiting [email or web address]

## 2022-12-16 LAB
A1AT SERPL-MCNC: 140 MG/DL (ref 95–164)
ACTIN IGG SERPL-ACNC: 4 UNITS (ref 0–19)
ANA SER QL IA: NEGATIVE
CERULOPLASMIN SERPL-MCNC: 19.6 MG/DL (ref 16–31)
ENDOMYSIUM IGA SER QL: NEGATIVE
GLIADIN PEPTIDE IGA SER-ACNC: 6 UNITS (ref 0–19)
GLIADIN PEPTIDE IGG SER-ACNC: 3 UNITS (ref 0–19)
IGA SERPL-MCNC: 150 MG/DL (ref 90–386)
MITOCHONDRIA M2 IGG SER-ACNC: <20 UNITS (ref 0–20)
TTG IGA SER-ACNC: <2 U/ML (ref 0–3)
TTG IGG SER-ACNC: <2 U/ML (ref 0–5)

## 2022-12-18 LAB — 25(OH)D3 SERPL-MCNC: 12.2 NG/ML (ref 30–100)

## 2022-12-27 DIAGNOSIS — E78.1 HYPERTRIGLYCERIDEMIA: Primary | ICD-10-CM

## 2022-12-27 RX ORDER — ROSUVASTATIN CALCIUM 40 MG/1
40 TABLET, COATED ORAL DAILY
Qty: 30 TABLET | Refills: 5 | Status: SHIPPED | OUTPATIENT
Start: 2022-12-27

## 2022-12-29 ENCOUNTER — TELEMEDICINE (OUTPATIENT)
Dept: FAMILY MEDICINE CLINIC | Facility: CLINIC | Age: 27
End: 2022-12-29

## 2022-12-29 DIAGNOSIS — J40 BRONCHITIS: Primary | ICD-10-CM

## 2022-12-29 RX ORDER — CEPHALEXIN 500 MG/1
500 CAPSULE ORAL EVERY 8 HOURS SCHEDULED
Qty: 21 CAPSULE | Refills: 0 | Status: SHIPPED | OUTPATIENT
Start: 2022-12-29 | End: 2023-01-05

## 2022-12-29 NOTE — PROGRESS NOTES
Virtual Regular Visi  Verification of patient location:    Patient is located in the following state in which I hold an active license [place]      Assessment/Plan:    Problem List Items Addressed This Visit        Respiratory    Bronchitis - Primary     Patient has exacerbation of his symptoms recently  He had flu infection which was treated  He felt okay over period of 7 days and recently just got worse  Reports fevers of 102, cough and sinus congestion  He was treated with levofloxacin recently  He has allergies to penicillins and he could not tolerate doxycycline  We will start him on Keflex 500 mg every 8 hours for 7 days  He will let me know if no improvement in 48 hours  We also thought about to do respiratory panel but he would like to hold off on that as for now  Relevant Medications    cephalexin (KEFLEX) 500 mg capsule            Reason for visit is No chief complaint on file  Encounter provider Leatha Campbell MD    Provider located at [address] [address] [phone]      Recent Visits  No visits were found meeting these conditions  Showing recent visits within past 7 days and meeting all other requirements  Today's Visits  Date Type Provider Dept   12/29/22 Telemedicine Leatha Campbell MD [place] [address] [facility]   Showing today's visits and meeting all other requirements  Future Appointments  No visits were found meeting these conditions  Showing future appointments within next 150 days and meeting all other requirements       The patient was identified by name and date of birth  Angela Keene was informed that this is a telemedicine visit and that the visit is being conducted through the [employer]  He agrees to proceed     My office door was closed  No one else was in the room    He acknowledged consent and understanding of privacy and security of the video platform  The patient has agreed to participate and understands they can discontinue the visit at any time  Patient is aware this is a billable service  Subjective  Juani Gallagher is a 33 y o  male   Patient called today again with fevers going up to 102, persistent cough nasal congestion and sinus discomfort  He recently was diagnosed with flu and treated for that  He reported overall he felt okay for 7 days and after that his symptoms came back  Past Medical History:   Diagnosis Date   • Anxiety    • Fatty liver    • Hypertension        Past Surgical History:   Procedure Laterality Date   • TONSILLECTOMY         Current Outpatient Medications   Medication Sig Dispense Refill   • cephalexin (KEFLEX) 500 mg capsule Take 1 capsule (500 mg total) by mouth every 8 (eight) hours for 7 days 21 capsule 0   • albuterol (PROVENTIL HFA,VENTOLIN HFA) 90 mcg/act inhaler INHALE 2 PUFFS BY MOUTH EVERY 6 HOURS AS NEEDED FOR WHEEZING 25 5 g 0   • benzonatate (TESSALON) 200 MG capsule Take 1 capsule (200 mg total) by mouth 3 (three) times a day as needed for cough (Patient not taking: Reported on 12/15/2022) 30 capsule 0   • cholecalciferol (VITAMIN D3) 1,000 units tablet Take 1 tablet (1,000 Units total) by mouth daily Start after done with the high-dose   90 tablet 3   • hyoscyamine (Levsin) 0 125 MG tablet Take 1 tablet (0 125 mg total) by mouth 3 (three) times a day before meals (Patient not taking: Reported on 12/15/2022) 90 tablet 2   • loperamide (IMODIUM) 2 mg capsule Take 1 capsule (2 mg total) by mouth 4 (four) times a day as needed for diarrhea (Patient not taking: Reported on 10/13/2022) 90 capsule 0   • meclizine (ANTIVERT) 25 mg tablet Take 1 tablet (25 mg total) by mouth every 8 (eight) hours (Patient not taking: Reported on 7/15/2022) 15 tablet 0   • montelukast (SINGULAIR) 10 mg tablet TAKE 1 TABLET(10 MG) BY MOUTH DAILY AT BEDTIME (Patient not taking: Reported on 12/15/2022) 90 tablet 0 • multivitamin (THERAGRAN) TABS Take 1 tablet by mouth daily     • pantoprazole (PROTONIX) 40 mg tablet TAKE 1 TABLET(40 MG) BY MOUTH DAILY 90 tablet 3   • Pulmicort Flexhaler 180 MCG/ACT inhaler INHALE 2 PUFFS BY MOUTH TWICE DAILY  RINSE MOUTH AFTER USE (Patient not taking: Reported on 7/15/2022) 1 each 0   • rosuvastatin (CRESTOR) 40 MG tablet Take 1 tablet (40 mg total) by mouth daily 30 tablet 5   • traZODone (DESYREL) 100 mg tablet TAKE 2 TABLETS(200 MG) BY MOUTH DAILY AT BEDTIME 90 tablet 1     No current facility-administered medications for this visit  Allergies   Allergen Reactions   • Penicillins Hives       Review of Systems   Constitutional: Positive for chills and fever  Negative for appetite change and fatigue  HENT: Positive for rhinorrhea and sinus pain  Respiratory: Positive for cough  Negative for chest tightness and shortness of breath  Cardiovascular: Negative for chest pain  Gastrointestinal: Negative for diarrhea, nausea and vomiting  Musculoskeletal: Negative for arthralgias and myalgias  Video Exam    There were no vitals filed for this visit      Physical Exam     I spent 20 minutes directly with the patient during this visit

## 2022-12-29 NOTE — ASSESSMENT & PLAN NOTE
Patient has exacerbation of his symptoms recently  He had flu infection which was treated  He felt okay over period of 7 days and recently just got worse  Reports fevers of 102, cough and sinus congestion  He was treated with levofloxacin recently  He has allergies to penicillins and he could not tolerate doxycycline  We will start him on Keflex 500 mg every 8 hours for 7 days  He will let me know if no improvement in 48 hours  We also thought about to do respiratory panel but he would like to hold off on that as for now

## 2022-12-30 ENCOUNTER — TELEPHONE (OUTPATIENT)
Dept: FAMILY MEDICINE CLINIC | Facility: CLINIC | Age: 27
End: 2022-12-30

## 2022-12-30 NOTE — TELEPHONE ENCOUNTER
Patient called office states he had hives after taking keflex, he has stopped this medication  No shortness of breath or chest pain, will add to allergy list, patient will go to be evaluated in person in ER/urgent care right away if worsening  He notes his fever has resolved last fever was 2 days ago

## 2023-01-03 ENCOUNTER — TELEPHONE (OUTPATIENT)
Dept: OTHER | Facility: OTHER | Age: 28
End: 2023-01-03

## 2023-01-03 NOTE — TELEPHONE ENCOUNTER
Patient called saying that he needs to canceled his appointment on 1/6 at 12:30 PM and his colonoscopy procedure that he have schedule on 1/9 because he have the flu and is asking if the office can give him back a call to reschedule his procedure 
Spoke w/ pt/pt rescheduled to 2/13/2023 at [facility] w/ Dr Shin Larkin for EGD
Statement Selected

## 2023-01-12 ENCOUNTER — OFFICE VISIT (OUTPATIENT)
Dept: FAMILY MEDICINE CLINIC | Facility: CLINIC | Age: 28
End: 2023-01-12

## 2023-01-12 VITALS
WEIGHT: 229 LBS | TEMPERATURE: 98.5 F | HEART RATE: 67 BPM | BODY MASS INDEX: 34.71 KG/M2 | HEIGHT: 68 IN | OXYGEN SATURATION: 97 % | DIASTOLIC BLOOD PRESSURE: 80 MMHG | SYSTOLIC BLOOD PRESSURE: 128 MMHG | RESPIRATION RATE: 18 BRPM

## 2023-01-12 DIAGNOSIS — K52.9 CHRONIC DIARRHEA: ICD-10-CM

## 2023-01-12 DIAGNOSIS — Z00.00 ANNUAL PHYSICAL EXAM: Primary | ICD-10-CM

## 2023-01-12 DIAGNOSIS — F41.9 ANXIETY: ICD-10-CM

## 2023-01-12 DIAGNOSIS — E78.2 MIXED HYPERLIPIDEMIA: ICD-10-CM

## 2023-01-12 DIAGNOSIS — R03.0 ELEVATED BLOOD PRESSURE READING IN OFFICE WITHOUT DIAGNOSIS OF HYPERTENSION: ICD-10-CM

## 2023-01-12 NOTE — PROGRESS NOTES
Assessment/Plan:    Chronic diarrhea  Continue follow-up with gastroenterology service  FMLA form filled out  Elevated blood pressure reading in office without diagnosis of hypertension  Blood pressure overall is acceptable  Continue healthy diet and exercise  Hold off on treatment  Anxiety  He was tried on multiple meds in the past looks like trazodone works for him helps with sleep and partially with anxiety  We agreed that we will continue trazodone as for now  We discussed with him that we will use low-dose benzodiazepine  Discussed with him that he cannot mix it with alcohol and should refrain from driving after taking the medication  He said that he has some frequent episodes of anxiety, he will probably use Xanax very rarely  We discussed that eventually we will plan to stop it  We will do urine drug screen on him, controlled substance agreement signed  Side effects of the medication discussed  Mixed hyperlipidemia  Continue with Crestor, recheck lipid panel         Diagnoses and all orders for this visit:    Annual physical exam    Anxiety  -     [facility] Prescribed Meds and Special Instructions  -     Amphetamines, Methamphetamines  -     Butalbital  -     Phenobarbital  -     Secobarbital  -     Temazepam  -     Alprazolam  -     Clonazepam  -     Diazepam  -     Lorazepam  -     Oxazepam  -     Gabapentin  -     Pregabalin  -     Cocaine  -     Heroin  -     Buprenorphine  -     Levorphanol  -     Meperidine  -     Naltrexone  -     Fentanyl  -     Methadone  -     Oxycodone  -     Oxymorphone  -     Tapentadol  -     THC  -     Tramadol  -     Codeine, Hydrocodone, Hydropmorphone, Morphine  -     Bath Salts  -     Ethyl Glucuronide/Ethyl Sulfate  -     Kratom  -     Spice  -     Methylphenidate  -     Phentermine  -     Validity Oxidant  -     Validity Creatinine  -     Validity pH  -     Validity Specific    Elevated blood pressure reading in office without diagnosis of hypertension    Chronic diarrhea    Mixed hyperlipidemia          Subjective:      Patient ID: Parveen Flynn is a 33 y o  male  Patient came today for annual checkup and to follow-up on his chronic problems  He does not smoke, does not drink alcohol  Up-to-date on tetanus shot  He had flu this season, he does not want to get vaccinated  His vital signs are acceptable, BMI is going down  Blood pressures are okay  The following portions of the patient's history were reviewed and updated as appropriate: allergies, current medications, past family history, past medical history, past social history, past surgical history, and problem list     Review of Systems   Constitutional: Negative for chills, fatigue and fever  Respiratory: Negative for cough, chest tightness and shortness of breath  Cardiovascular: Negative for chest pain, palpitations and leg swelling  Gastrointestinal: Positive for diarrhea  Negative for abdominal distention, abdominal pain, blood in stool, constipation and nausea  Genitourinary: Negative for difficulty urinating and dysuria  Musculoskeletal: Negative for arthralgias, back pain, gait problem, joint swelling, myalgias and neck pain  Skin: Negative for rash  Neurological: Negative for dizziness, weakness, numbness and headaches  Psychiatric/Behavioral: Negative for agitation, dysphoric mood, self-injury and suicidal ideas  The patient is nervous/anxious            Objective:      /80 (BP Location: Left arm, Patient Position: Sitting, Cuff Size: Adult)   Pulse 67   Temp 98 5 °F (36 9 °C) (Tympanic)   Resp 18   Ht 5' 8" (1 727 m)   Wt 104 kg (229 lb)   SpO2 97%   BMI 34 82 kg/m²     Allergies   Allergen Reactions   • Keflex [Cephalexin] Hives   • Penicillins Hives          Current Outpatient Medications:   •  albuterol (PROVENTIL HFA,VENTOLIN HFA) 90 mcg/act inhaler, INHALE 2 PUFFS BY MOUTH EVERY 6 HOURS AS NEEDED FOR WHEEZING, Disp: 25 5 g, Rfl: 0  • loperamide (IMODIUM) 2 mg capsule, Take 1 capsule (2 mg total) by mouth 4 (four) times a day as needed for diarrhea, Disp: 90 capsule, Rfl: 0  •  multivitamin (THERAGRAN) TABS, Take 1 tablet by mouth daily, Disp: , Rfl:   •  pantoprazole (PROTONIX) 40 mg tablet, TAKE 1 TABLET(40 MG) BY MOUTH DAILY, Disp: 90 tablet, Rfl: 3  •  rosuvastatin (CRESTOR) 40 MG tablet, Take 1 tablet (40 mg total) by mouth daily, Disp: 30 tablet, Rfl: 5  •  traZODone (DESYREL) 100 mg tablet, TAKE 2 TABLETS(200 MG) BY MOUTH DAILY AT BEDTIME, Disp: 90 tablet, Rfl: 1  •  cholecalciferol (VITAMIN D3) 1,000 units tablet, Take 1 tablet (1,000 Units total) by mouth daily Start after done with the high-dose , Disp: 90 tablet, Rfl: 3     There are no Patient Instructions on file for this visit  Physical Exam  Constitutional:       Appearance: He is not ill-appearing  HENT:      Head: Normocephalic and atraumatic  Nose: No congestion or rhinorrhea  Eyes:      Pupils: Pupils are equal, round, and reactive to light  Cardiovascular:      Rate and Rhythm: Normal rate and regular rhythm  Pulses: Normal pulses  Heart sounds: Normal heart sounds  No murmur heard  No friction rub  No gallop  Pulmonary:      Effort: Pulmonary effort is normal  No respiratory distress  Breath sounds: Normal breath sounds  No wheezing or rales  Chest:      Chest wall: No tenderness  Abdominal:      Palpations: Abdomen is soft  Musculoskeletal:         General: No swelling, tenderness or deformity  Cervical back: No rigidity  No muscular tenderness  Skin:     Coloration: Skin is not pale  Findings: No erythema, lesion or rash  Neurological:      Mental Status: He is alert and oriented to person, place, and time  Sensory: No sensory deficit  Motor: No weakness  Psychiatric:         Behavior: Behavior normal          Thought Content:  Thought content normal          Judgment: Judgment normal

## 2023-01-12 NOTE — ASSESSMENT & PLAN NOTE
He was tried on multiple meds in the past looks like trazodone works for him helps with sleep and partially with anxiety  We agreed that we will continue trazodone as for now  We discussed with him that we will use low-dose benzodiazepine  Discussed with him that he cannot mix it with alcohol and should refrain from driving after taking the medication  He said that he has some frequent episodes of anxiety, he will probably use Xanax very rarely  We discussed that eventually we will plan to stop it  We will do urine drug screen on him, controlled substance agreement signed  Side effects of the medication discussed

## 2023-01-14 LAB

## 2023-01-16 DIAGNOSIS — F41.9 ANXIETY: Primary | ICD-10-CM

## 2023-01-16 RX ORDER — ALPRAZOLAM 0.25 MG/1
0.25 TABLET ORAL
Qty: 30 TABLET | Refills: 0 | Status: SHIPPED | OUTPATIENT
Start: 2023-01-16

## 2023-01-31 ENCOUNTER — OFFICE VISIT (OUTPATIENT)
Dept: FAMILY MEDICINE CLINIC | Facility: CLINIC | Age: 28
End: 2023-01-31

## 2023-01-31 ENCOUNTER — TELEPHONE (OUTPATIENT)
Dept: FAMILY MEDICINE CLINIC | Facility: CLINIC | Age: 28
End: 2023-01-31

## 2023-01-31 VITALS
BODY MASS INDEX: 34.86 KG/M2 | OXYGEN SATURATION: 97 % | HEIGHT: 68 IN | WEIGHT: 230 LBS | SYSTOLIC BLOOD PRESSURE: 110 MMHG | TEMPERATURE: 97.3 F | HEART RATE: 63 BPM | RESPIRATION RATE: 16 BRPM | DIASTOLIC BLOOD PRESSURE: 82 MMHG

## 2023-01-31 DIAGNOSIS — R51.9 NONINTRACTABLE HEADACHE, UNSPECIFIED CHRONICITY PATTERN, UNSPECIFIED HEADACHE TYPE: ICD-10-CM

## 2023-01-31 DIAGNOSIS — K52.9 CHRONIC DIARRHEA: ICD-10-CM

## 2023-01-31 DIAGNOSIS — R42 DIZZINESS: Primary | ICD-10-CM

## 2023-01-31 DIAGNOSIS — R03.0 ELEVATED BLOOD PRESSURE READING: ICD-10-CM

## 2023-01-31 RX ORDER — MECLIZINE HYDROCHLORIDE 25 MG/1
25 TABLET ORAL EVERY 8 HOURS PRN
Qty: 30 TABLET | Refills: 0 | Status: SHIPPED | OUTPATIENT
Start: 2023-01-31

## 2023-01-31 NOTE — ASSESSMENT & PLAN NOTE
He reported episodes of elevated blood pressures at home up to 160/90  He is using [employer] blood pressure cuff which should be reliable but he was instructed to bring it to me with the next appointment  Blood pressures rechecked by me today 130/80  He will continue to monitor at home

## 2023-01-31 NOTE — ASSESSMENT & PLAN NOTE
Can be due to his recent blood pressure fluctuations  Blood pressure improved right now  Blair-Hallpike maneuver was negative  We will continue to watch for now  We will use meclizine for symptomatic management

## 2023-01-31 NOTE — PROGRESS NOTES
Assessment/Plan:    Chronic diarrhea  Still ongoing problem, he reports symptoms that are more prominent on Mondays this is most likely due to his dietary routine  We will continue with symptomatic management  He will continue follow-up with gastroenterology  Elevated blood pressure reading  He reported episodes of elevated blood pressures at home up to 160/90  He is using [employer] blood pressure cuff which should be reliable but he was instructed to bring it to me with the next appointment  Blood pressures rechecked by me today 130/80  He will continue to monitor at home  Nonintractable headache  Overall improved with Excedrin that he used only once  Neurologic exam is negative  Dizziness  Can be due to his recent blood pressure fluctuations  Blood pressure improved right now  Trung-Hallpike maneuver was negative  We will continue to watch for now  We will use meclizine for symptomatic management  Diagnoses and all orders for this visit:    Dizziness  -     meclizine (ANTIVERT) 25 mg tablet; Take 1 tablet (25 mg total) by mouth every 8 (eight) hours as needed for dizziness    Elevated blood pressure reading    Chronic diarrhea    Nonintractable headache, unspecified chronicity pattern, unspecified headache type          Subjective:      Patient ID: Teresa Koch is a 33 y o  male  HPI    The following portions of the patient's history were reviewed and updated as appropriate: allergies, current medications, past family history, past medical history, past social history, past surgical history, and problem list     Review of Systems   Constitutional: Negative for appetite change, chills, fatigue and fever  HENT: Negative for rhinorrhea, sinus pain and sore throat  Respiratory: Negative for cough, chest tightness and shortness of breath  Cardiovascular: Negative for chest pain  Gastrointestinal: Positive for diarrhea  Negative for nausea and vomiting     Musculoskeletal: Negative for arthralgias and myalgias  Neurological: Positive for dizziness and headaches  Objective:      /82 (BP Location: Left arm, Patient Position: Sitting, Cuff Size: Large)   Pulse 63   Temp (!) 97 3 °F (36 3 °C) (Temporal)   Resp 16   Ht 5' 8" (1 727 m)   Wt 104 kg (230 lb)   SpO2 97%   BMI 34 97 kg/m²     Allergies   Allergen Reactions   • Keflex [Cephalexin] Hives   • Penicillins Hives          Current Outpatient Medications:   •  albuterol (PROVENTIL HFA,VENTOLIN HFA) 90 mcg/act inhaler, INHALE 2 PUFFS BY MOUTH EVERY 6 HOURS AS NEEDED FOR WHEEZING, Disp: 25 5 g, Rfl: 0  •  ALPRAZolam (XANAX) 0 25 mg tablet, Take 1 tablet (0 25 mg total) by mouth daily at bedtime as needed for anxiety, Disp: 30 tablet, Rfl: 0  •  loperamide (IMODIUM) 2 mg capsule, Take 1 capsule (2 mg total) by mouth 4 (four) times a day as needed for diarrhea, Disp: 90 capsule, Rfl: 0  •  meclizine (ANTIVERT) 25 mg tablet, Take 1 tablet (25 mg total) by mouth every 8 (eight) hours as needed for dizziness, Disp: 30 tablet, Rfl: 0  •  multivitamin (THERAGRAN) TABS, Take 1 tablet by mouth daily, Disp: , Rfl:   •  pantoprazole (PROTONIX) 40 mg tablet, TAKE 1 TABLET(40 MG) BY MOUTH DAILY, Disp: 90 tablet, Rfl: 3  •  rosuvastatin (CRESTOR) 40 MG tablet, Take 1 tablet (40 mg total) by mouth daily, Disp: 30 tablet, Rfl: 5  •  traZODone (DESYREL) 100 mg tablet, TAKE 2 TABLETS(200 MG) BY MOUTH DAILY AT BEDTIME, Disp: 90 tablet, Rfl: 1  •  cholecalciferol (VITAMIN D3) 1,000 units tablet, Take 1 tablet (1,000 Units total) by mouth daily Start after done with the high-dose , Disp: 90 tablet, Rfl: 3     There are no Patient Instructions on file for this visit  Physical Exam  Constitutional:       General: He is not in acute distress  Appearance: He is not ill-appearing or toxic-appearing  Cardiovascular:      Heart sounds: No murmur heard  No gallop  Pulmonary:      Effort: No respiratory distress  Breath sounds:  No wheezing or rales  Neurological:      General: No focal deficit present  Cranial Nerves: No cranial nerve deficit  Motor: No weakness        Gait: Gait normal       Comments: Negative Trung-Hallpike

## 2023-01-31 NOTE — TELEPHONE ENCOUNTER
Requested blank FMLA forms for current claim  PCP states he needs to update forms  [facility] [employer] will be faxing to in-house and secure fax

## 2023-01-31 NOTE — ASSESSMENT & PLAN NOTE
Still ongoing problem, he reports symptoms that are more prominent on Mondays this is most likely due to his dietary routine  We will continue with symptomatic management  He will continue follow-up with gastroenterology

## 2023-01-31 NOTE — LETTER
January 31, 2023     Patient: Angela Keene  YOB: 1995  Date of Visit: 1/31/2023      To Whom it May Concern:    Blue Kelly is under my professional care  Jennifer Robert was seen in my office on 1/31/2023  Jennifer Robert still suffering from diarrhea which is more prominent on Mondays that prevents him from working productively throughout his shift  Please adjust his schedule so he will be able to take Monday as he is day off  If you have any questions or concerns, please don't hesitate to call           Sincerely,          Leatha Campbell MD        CC: No Recipients

## 2023-02-07 DIAGNOSIS — F51.01 PRIMARY INSOMNIA: ICD-10-CM

## 2023-02-07 RX ORDER — TRAZODONE HYDROCHLORIDE 100 MG/1
TABLET ORAL
Qty: 90 TABLET | Refills: 1 | Status: SHIPPED | OUTPATIENT
Start: 2023-02-07

## 2023-02-13 ENCOUNTER — HOSPITAL ENCOUNTER (OUTPATIENT)
Dept: GASTROENTEROLOGY | Facility: HOSPITAL | Age: 28
Setting detail: OUTPATIENT SURGERY
Discharge: HOME | End: 2023-02-13
Attending: INTERNAL MEDICINE

## 2023-02-13 ENCOUNTER — ANESTHESIA EVENT (OUTPATIENT)
Dept: GASTROENTEROLOGY | Facility: HOSPITAL | Age: 28
End: 2023-02-13

## 2023-02-13 ENCOUNTER — ANESTHESIA (OUTPATIENT)
Dept: GASTROENTEROLOGY | Facility: HOSPITAL | Age: 28
End: 2023-02-13

## 2023-02-13 VITALS
DIASTOLIC BLOOD PRESSURE: 72 MMHG | BODY MASS INDEX: 31.15 KG/M2 | SYSTOLIC BLOOD PRESSURE: 152 MMHG | HEIGHT: 72 IN | OXYGEN SATURATION: 94 % | RESPIRATION RATE: 16 BRPM | TEMPERATURE: 97 F | WEIGHT: 230 LBS | HEART RATE: 64 BPM

## 2023-02-13 DIAGNOSIS — K44.9 HIATAL HERNIA: ICD-10-CM

## 2023-02-13 DIAGNOSIS — K21.00 GASTROESOPHAGEAL REFLUX DISEASE WITH ESOPHAGITIS WITHOUT HEMORRHAGE: Primary | ICD-10-CM

## 2023-02-13 DIAGNOSIS — K22.719 BARRETT'S ESOPHAGUS WITH DYSPLASIA: ICD-10-CM

## 2023-02-13 RX ORDER — PROPOFOL 10 MG/ML
INJECTION, EMULSION INTRAVENOUS AS NEEDED
Status: DISCONTINUED | OUTPATIENT
Start: 2023-02-13 | End: 2023-02-13

## 2023-02-13 RX ORDER — FENTANYL CITRATE 50 UG/ML
INJECTION, SOLUTION INTRAMUSCULAR; INTRAVENOUS AS NEEDED
Status: DISCONTINUED | OUTPATIENT
Start: 2023-02-13 | End: 2023-02-13

## 2023-02-13 RX ORDER — LIDOCAINE HYDROCHLORIDE 20 MG/ML
INJECTION, SOLUTION EPIDURAL; INFILTRATION; INTRACAUDAL; PERINEURAL AS NEEDED
Status: DISCONTINUED | OUTPATIENT
Start: 2023-02-13 | End: 2023-02-13

## 2023-02-13 RX ORDER — SODIUM CHLORIDE, SODIUM LACTATE, POTASSIUM CHLORIDE, CALCIUM CHLORIDE 600; 310; 30; 20 MG/100ML; MG/100ML; MG/100ML; MG/100ML
INJECTION, SOLUTION INTRAVENOUS CONTINUOUS PRN
Status: DISCONTINUED | OUTPATIENT
Start: 2023-02-13 | End: 2023-02-13

## 2023-02-13 RX ADMIN — LIDOCAINE HYDROCHLORIDE 100 MG: 20 INJECTION, SOLUTION EPIDURAL; INFILTRATION; INTRACAUDAL; PERINEURAL at 10:24

## 2023-02-13 RX ADMIN — PROPOFOL 20 MG: 10 INJECTION, EMULSION INTRAVENOUS at 10:34

## 2023-02-13 RX ADMIN — PROPOFOL 20 MG: 10 INJECTION, EMULSION INTRAVENOUS at 10:27

## 2023-02-13 RX ADMIN — PROPOFOL 200 MG: 10 INJECTION, EMULSION INTRAVENOUS at 10:24

## 2023-02-13 RX ADMIN — SODIUM CHLORIDE, SODIUM LACTATE, POTASSIUM CHLORIDE, AND CALCIUM CHLORIDE: .6; .31; .03; .02 INJECTION, SOLUTION INTRAVENOUS at 10:02

## 2023-02-13 RX ADMIN — PROPOFOL 20 MG: 10 INJECTION, EMULSION INTRAVENOUS at 10:30

## 2023-02-13 RX ADMIN — FENTANYL CITRATE 50 MCG: 50 INJECTION INTRAMUSCULAR; INTRAVENOUS at 10:21

## 2023-02-13 NOTE — H&P
History and Physical - [facility]  Chauncey Mccabe 33 y o  male MRN: [id number]                  HPI: Chauncey Mccabe is a 33y o  year old male who presents for GERD      REVIEW OF SYSTEMS: Per the HPI, and otherwise unremarkable      Historical Information   Past Medical History:   Diagnosis Date   • Anxiety    • Fatty liver    • GERD (gastroesophageal reflux disease)    • Hyperlipidemia    • Hypertension      Past Surgical History:   Procedure Laterality Date   • TONSILLECTOMY       Social History   Social History     Substance and Sexual Activity   Alcohol Use Not Currently    Comment: holiday/special occassion     Social History     Substance and Sexual Activity   Drug Use Never     Social History     Tobacco Use   Smoking Status Never   Smokeless Tobacco Never     Family History   Problem Relation Age of Onset   • Cancer Mother    • Diabetes Mother    • Hypertension Mother    • Anxiety disorder Mother    • Heart attack Father    • Hypertension Father    • Anxiety disorder Father    • Cancer Maternal Aunt        Meds/Allergies       Current Outpatient Medications:   •  ALPRAZolam (XANAX) 0 25 mg tablet  •  loperamide (IMODIUM) 2 mg capsule  •  meclizine (ANTIVERT) 25 mg tablet  •  multivitamin (THERAGRAN) TABS  •  pantoprazole (PROTONIX) 40 mg tablet  •  rosuvastatin (CRESTOR) 40 MG tablet  •  traZODone (DESYREL) 100 mg tablet  •  albuterol (PROVENTIL HFA,VENTOLIN HFA) 90 mcg/act inhaler  •  cholecalciferol (VITAMIN D3) 1,000 units tablet    Allergies   Allergen Reactions   • Keflex [Cephalexin] Hives   • Penicillins Hives       Objective     /76   Pulse 74   Temp (!) 97 3 °F (36 3 °C) (Temporal)   Resp 18   Ht 6' (1 829 m)   Wt 104 kg (230 lb)   SpO2 96%   BMI 31 19 kg/m²       PHYSICAL EXAM    Gen: NAD  Head: NCAT  CV: RRR  CHEST: Clear  ABD: soft, NT/ND  EXT: no edema      ASSESSMENT/PLAN:  This is a 33y o  year old male here for EGD, and he is stable and optimized for his procedure

## 2023-02-13 NOTE — ANESTHESIA POSTPROCEDURE EVALUATION
Post-Op Assessment Note    CV Status:  Stable  Pain Score: 0    Pain management: adequate     Mental Status:  Awake and alert   Hydration Status:  Stable   PONV Controlled:  None   Airway Patency:  Patent and adequate      Post Op Vitals Reviewed: Yes      Staff: CRNA, Anesthesiologist         No notable events documented      BP   114/69   Temp     Pulse  60   Resp   16   SpO2   97%

## 2023-02-13 NOTE — ANESTHESIA PREPROCEDURE EVALUATION
Procedure:  EGD    Relevant Problems   CARDIO   (+) Mixed hyperlipidemia      GI/HEPATIC   (+) Gastroesophageal reflux disease with esophagitis without hemorrhage      MUSCULOSKELETAL   (+) Chronic low back pain      NEURO/PSYCH   (+) Anxiety   (+) Chronic low back pain   (+) Nonintractable headache        Physical Exam    Airway    Mallampati score: II  TM Distance: >3 FB  Neck ROM: full     Dental   No notable dental hx     Cardiovascular      Pulmonary      Other Findings        Anesthesia Plan  ASA Score- 2     Anesthesia Type- IV sedation with anesthesia with ASA Monitors  Additional Monitors:   Airway Plan:           Plan Factors-Exercise tolerance (METS): >4 METS  Chart reviewed  Patient summary reviewed  Patient is not a current smoker  Induction- intravenous  Postoperative Plan-     Informed Consent- Anesthetic plan and risks discussed with patient  I personally reviewed this patient with the CRNA  Discussed and agreed on the Anesthesia Plan with the CRNA  Roz Clarke

## 2023-03-14 ENCOUNTER — OFFICE VISIT (OUTPATIENT)
Dept: FAMILY MEDICINE CLINIC | Facility: CLINIC | Age: 28
End: 2023-03-14

## 2023-03-14 VITALS
DIASTOLIC BLOOD PRESSURE: 80 MMHG | WEIGHT: 227.4 LBS | HEIGHT: 72 IN | SYSTOLIC BLOOD PRESSURE: 140 MMHG | BODY MASS INDEX: 30.8 KG/M2 | RESPIRATION RATE: 16 BRPM | TEMPERATURE: 97.2 F | HEART RATE: 87 BPM | OXYGEN SATURATION: 99 %

## 2023-03-14 DIAGNOSIS — K08.89 PAIN, DENTAL: Primary | ICD-10-CM

## 2023-03-14 NOTE — PROGRESS NOTES
Assessment/Plan:    No problem-specific Assessment & Plan notes found for this encounter  Diagnoses and all orders for this visit:    Pain, dental        Continue follow-up with dentist, he is currently on clindamycin, ibuprofen and as needed oxycodone  Tolerates medications well  He has scheduled appointment with dentist, he also reported that x-rays are planned for him  Reports significant pain with speaking, cannot perform his duties at work due to that right now  FMLA forms were filled out for him during his visit  Subjective:      Patient ID: Oralia Mehta is a 33 y o  male  Patient came today with complains of a pain at the area of his wisdom teeth that were recently removed by the dentist   He is currently on clindamycin, NSAIDs and as needed oxycodone  Reports significant pain that makes his talking difficult  He works as an  on [employer]  He brought me his FMLA forms to fill out due to above problem  The following portions of the patient's history were reviewed and updated as appropriate: allergies, current medications, past family history, past medical history, past social history, past surgical history, and problem list     Review of Systems   Constitutional: Negative for chills and fever  HENT: Positive for dental problem            Objective:      /80 (BP Location: Left arm, Patient Position: Sitting, Cuff Size: Large)   Pulse 87   Temp (!) 97 2 °F (36 2 °C) (Temporal)   Resp 16   Ht 6' (1 829 m)   Wt 103 kg (227 lb 6 4 oz)   SpO2 99%   BMI 30 84 kg/m²     Allergies   Allergen Reactions   • Keflex [Cephalexin] Hives   • Penicillins Hives          Current Outpatient Medications:   •  albuterol (PROVENTIL HFA,VENTOLIN HFA) 90 mcg/act inhaler, INHALE 2 PUFFS BY MOUTH EVERY 6 HOURS AS NEEDED FOR WHEEZING, Disp: 25 5 g, Rfl: 0  •  ALPRAZolam (XANAX) 0 25 mg tablet, Take 1 tablet (0 25 mg total) by mouth daily at bedtime as needed for anxiety, Disp: 30 tablet, Rfl: 0  •  cholecalciferol (VITAMIN D3) 1,000 units tablet, Take 1 tablet (1,000 Units total) by mouth daily Start after done with the high-dose , Disp: 90 tablet, Rfl: 3  •  loperamide (IMODIUM) 2 mg capsule, Take 1 capsule (2 mg total) by mouth 4 (four) times a day as needed for diarrhea, Disp: 90 capsule, Rfl: 0  •  meclizine (ANTIVERT) 25 mg tablet, Take 1 tablet (25 mg total) by mouth every 8 (eight) hours as needed for dizziness, Disp: 30 tablet, Rfl: 0  •  multivitamin (THERAGRAN) TABS, Take 1 tablet by mouth daily, Disp: , Rfl:   •  pantoprazole (PROTONIX) 40 mg tablet, TAKE 1 TABLET(40 MG) BY MOUTH DAILY, Disp: 90 tablet, Rfl: 3  •  rosuvastatin (CRESTOR) 40 MG tablet, Take 1 tablet (40 mg total) by mouth daily, Disp: 30 tablet, Rfl: 5  •  traZODone (DESYREL) 100 mg tablet, TAKE 2 TABLETS(200 MG) BY MOUTH DAILY AT BEDTIME, Disp: 90 tablet, Rfl: 1     There are no Patient Instructions on file for this visit  Physical Exam  Constitutional:       General: He is not in acute distress  Appearance: He is not toxic-appearing  HENT:      Mouth/Throat:      Mouth: Mucous membranes are moist       Pharynx: No oropharyngeal exudate or posterior oropharyngeal erythema  Comments: Gums are swollen at the level of the wisdom teeth

## 2023-03-15 DIAGNOSIS — K22.719 BARRETT'S ESOPHAGUS WITH DYSPLASIA: ICD-10-CM

## 2023-03-15 DIAGNOSIS — K21.00 GASTROESOPHAGEAL REFLUX DISEASE WITH ESOPHAGITIS WITHOUT HEMORRHAGE: ICD-10-CM

## 2023-03-15 RX ORDER — PANTOPRAZOLE SODIUM 40 MG/1
TABLET, DELAYED RELEASE ORAL
Qty: 90 TABLET | Refills: 3 | Status: SHIPPED | OUTPATIENT
Start: 2023-03-15

## 2023-03-23 ENCOUNTER — TELEPHONE (OUTPATIENT)
Dept: FAMILY MEDICINE CLINIC | Facility: CLINIC | Age: 28
End: 2023-03-23

## 2023-03-23 NOTE — TELEPHONE ENCOUNTER
Patient called 3/23/23 asking if you can extend his leave of absence from 03-27/23 to 4-03-23 or must he come in the office for an appointment    Also he wants a letter stating he needs a therapy dog

## 2023-03-28 ENCOUNTER — OFFICE VISIT (OUTPATIENT)
Dept: FAMILY MEDICINE CLINIC | Facility: CLINIC | Age: 28
End: 2023-03-28

## 2023-03-28 VITALS
RESPIRATION RATE: 16 BRPM | TEMPERATURE: 97.6 F | HEIGHT: 72 IN | HEART RATE: 91 BPM | OXYGEN SATURATION: 98 % | SYSTOLIC BLOOD PRESSURE: 128 MMHG | BODY MASS INDEX: 29.96 KG/M2 | DIASTOLIC BLOOD PRESSURE: 70 MMHG | WEIGHT: 221.2 LBS

## 2023-03-28 DIAGNOSIS — K08.89 PAIN, DENTAL: Primary | ICD-10-CM

## 2023-03-28 DIAGNOSIS — F41.9 ANXIETY: ICD-10-CM

## 2023-03-28 RX ORDER — ALPRAZOLAM 0.25 MG/1
0.25 TABLET ORAL
Qty: 30 TABLET | Refills: 0 | Status: SHIPPED | OUTPATIENT
Start: 2023-03-28

## 2023-03-28 NOTE — LETTER
March 28, 2023     Patient: Harish Bullock  YOB: 1995  Date of Visit: 3/28/2023      To Whom it May Concern:    Dougie Romeo is under my professional care  Teodorotracy Samano was seen in my office on 3/28/2023  Teodorotracy Samano has established diagnosis of anxiety  He is using emotional support dogs as a part of his support and treatment  If you have any questions or concerns, please don't hesitate to call           Sincerely,          Alex Mazariegos MD        CC: No Recipients

## 2023-03-28 NOTE — ASSESSMENT & PLAN NOTE
Overall relatively well controlled on as needed daily exam next, he uses it very rarely  Denies any side effects  His urine drug screen was clean just recently  He is very reliable  He cannot tolerate SSRIs in the past, trial of other medications he also did not give us any benefit  Continue with as needed benzodiazepines

## 2023-03-28 NOTE — ASSESSMENT & PLAN NOTE
Due to his dental pain he cannot return back to work  He will go back on 4/4/2023 after he will have his appointment with a dentist which will be scheduled this week  Finish course of antibiotics, reports some improvement but still complains of a pain which is relatively well controlled on ibuprofen

## 2023-03-28 NOTE — PROGRESS NOTES
Assessment/Plan:    Anxiety  Overall relatively well controlled on as needed daily exam next, he uses it very rarely  Denies any side effects  His urine drug screen was clean just recently  He is very reliable  He cannot tolerate SSRIs in the past, trial of other medications he also did not give us any benefit  Continue with as needed benzodiazepines  Pain, dental  Due to his dental pain he cannot return back to work  He will go back on 4/4/2023 after he will have his appointment with a dentist which will be scheduled this week  Finish course of antibiotics, reports some improvement but still complains of a pain which is relatively well controlled on ibuprofen  Diagnoses and all orders for this visit:    Pain, dental    Anxiety  -     ALPRAZolam (XANAX) 0 25 mg tablet; Take 1 tablet (0 25 mg total) by mouth daily at bedtime as needed for anxiety          Subjective:      Patient ID: Savannah Paniagua is a 33 y o  male  Patient came today for follow-up on his chronic problems including dental pain, anxiety  The following portions of the patient's history were reviewed and updated as appropriate: allergies, current medications, past family history, past medical history, past social history, past surgical history, and problem list     Review of Systems   Constitutional: Negative for chills and fever  HENT: Positive for dental problem  Psychiatric/Behavioral: The patient is nervous/anxious            Objective:      /70 (BP Location: Left arm, Patient Position: Sitting, Cuff Size: Large)   Pulse 91   Temp 97 6 °F (36 4 °C) (Temporal)   Resp 16   Ht 6' (1 829 m)   Wt 100 kg (221 lb 3 2 oz)   SpO2 98%   BMI 30 00 kg/m²     Allergies   Allergen Reactions   • Keflex [Cephalexin] Hives   • Penicillins Hives          Current Outpatient Medications:   •  albuterol (PROVENTIL HFA,VENTOLIN HFA) 90 mcg/act inhaler, INHALE 2 PUFFS BY MOUTH EVERY 6 HOURS AS NEEDED FOR WHEEZING, Disp: 25 5 g, Rfl: 0  •  ALPRAZolam (XANAX) 0 25 mg tablet, Take 1 tablet (0 25 mg total) by mouth daily at bedtime as needed for anxiety, Disp: 30 tablet, Rfl: 0  •  cholecalciferol (VITAMIN D3) 1,000 units tablet, Take 1 tablet (1,000 Units total) by mouth daily Start after done with the high-dose , Disp: 90 tablet, Rfl: 3  •  loperamide (IMODIUM) 2 mg capsule, Take 1 capsule (2 mg total) by mouth 4 (four) times a day as needed for diarrhea, Disp: 90 capsule, Rfl: 0  •  meclizine (ANTIVERT) 25 mg tablet, Take 1 tablet (25 mg total) by mouth every 8 (eight) hours as needed for dizziness, Disp: 30 tablet, Rfl: 0  •  multivitamin (THERAGRAN) TABS, Take 1 tablet by mouth daily, Disp: , Rfl:   •  pantoprazole (PROTONIX) 40 mg tablet, TAKE 1 TABLET(40 MG) BY MOUTH DAILY, Disp: 90 tablet, Rfl: 3  •  rosuvastatin (CRESTOR) 40 MG tablet, Take 1 tablet (40 mg total) by mouth daily, Disp: 30 tablet, Rfl: 5  •  traZODone (DESYREL) 100 mg tablet, TAKE 2 TABLETS(200 MG) BY MOUTH DAILY AT BEDTIME, Disp: 90 tablet, Rfl: 1     There are no Patient Instructions on file for this visit  Physical Exam  Vitals reviewed  Constitutional:       General: He is not in acute distress  Appearance: He is not toxic-appearing  HENT:      Head: Normocephalic  Cardiovascular:      Rate and Rhythm: Normal rate  Pulses: Normal pulses  Pulmonary:      Effort: Pulmonary effort is normal    Skin:     General: Skin is warm  Neurological:      General: No focal deficit present  Mental Status: He is alert     Psychiatric:         Mood and Affect: Mood normal          Behavior: Behavior normal

## 2023-04-25 ENCOUNTER — CONSULT (OUTPATIENT)
Dept: FAMILY MEDICINE CLINIC | Facility: CLINIC | Age: 28
End: 2023-04-25

## 2023-04-25 VITALS
SYSTOLIC BLOOD PRESSURE: 130 MMHG | BODY MASS INDEX: 29.77 KG/M2 | RESPIRATION RATE: 12 BRPM | HEART RATE: 80 BPM | OXYGEN SATURATION: 96 % | WEIGHT: 219.8 LBS | TEMPERATURE: 97.5 F | DIASTOLIC BLOOD PRESSURE: 72 MMHG | HEIGHT: 72 IN

## 2023-04-25 DIAGNOSIS — K08.89 PAIN, DENTAL: Primary | ICD-10-CM

## 2023-04-26 NOTE — PROGRESS NOTES
Assessment/Plan:    No problem-specific Assessment & Plan notes found for this encounter  Diagnoses and all orders for this visit:    Pain, dental          Continue follow-up with oral surgery  He still complains of a pain which is more pronounced with chewing and talking  He is not able to work as for now  Forms for short-term disability were filled out for the patient  Subjective:      Patient ID: Koki Max is a 33 y o  male  Patient came today for follow-up on his ongoing problem with dental pain and headaches that are likely due to that  He follows up with oral surgery  The following portions of the patient's history were reviewed and updated as appropriate: allergies, current medications, past family history, past medical history, past social history, past surgical history, and problem list     Review of Systems   Constitutional: Negative for chills and fever  HENT: Positive for dental problem  Neurological: Positive for headaches  Psychiatric/Behavioral: The patient is not nervous/anxious            Objective:      /72 (BP Location: Left arm, Patient Position: Sitting, Cuff Size: Large)   Pulse 80   Temp 97 5 °F (36 4 °C) (Temporal)   Resp 12   Ht 6' (1 829 m)   Wt 99 7 kg (219 lb 12 8 oz)   SpO2 96%   BMI 29 81 kg/m²     Allergies   Allergen Reactions   • Keflex [Cephalexin] Hives   • Penicillins Hives          Current Outpatient Medications:   •  albuterol (PROVENTIL HFA,VENTOLIN HFA) 90 mcg/act inhaler, INHALE 2 PUFFS BY MOUTH EVERY 6 HOURS AS NEEDED FOR WHEEZING, Disp: 25 5 g, Rfl: 0  •  ALPRAZolam (XANAX) 0 25 mg tablet, Take 1 tablet (0 25 mg total) by mouth daily at bedtime as needed for anxiety, Disp: 30 tablet, Rfl: 0  •  cholecalciferol (VITAMIN D3) 1,000 units tablet, Take 1 tablet (1,000 Units total) by mouth daily Start after done with the high-dose , Disp: 90 tablet, Rfl: 3  •  loperamide (IMODIUM) 2 mg capsule, Take 1 capsule (2 mg total) by mouth 4 (four) times a day as needed for diarrhea, Disp: 90 capsule, Rfl: 0  •  meclizine (ANTIVERT) 25 mg tablet, Take 1 tablet (25 mg total) by mouth every 8 (eight) hours as needed for dizziness, Disp: 30 tablet, Rfl: 0  •  multivitamin (THERAGRAN) TABS, Take 1 tablet by mouth daily, Disp: , Rfl:   •  pantoprazole (PROTONIX) 40 mg tablet, TAKE 1 TABLET(40 MG) BY MOUTH DAILY, Disp: 90 tablet, Rfl: 3  •  rosuvastatin (CRESTOR) 40 MG tablet, Take 1 tablet (40 mg total) by mouth daily, Disp: 30 tablet, Rfl: 5  •  traZODone (DESYREL) 100 mg tablet, TAKE 2 TABLETS(200 MG) BY MOUTH DAILY AT BEDTIME, Disp: 90 tablet, Rfl: 1  •  dexamethasone (DECADRON) 4 mg tablet, Take 1 tablet (4 mg total) by mouth daily with breakfast, Disp: 5 tablet, Rfl: 0     There are no Patient Instructions on file for this visit  Physical Exam  Constitutional:       General: He is not in acute distress  Appearance: He is not toxic-appearing  Cardiovascular:      Rate and Rhythm: Normal rate  Musculoskeletal:         General: Tenderness (Tenderness over bilateral TMJs ) present  Neurological:      General: No focal deficit present  Cranial Nerves: No cranial nerve deficit  Sensory: No sensory deficit  Motor: No weakness        Coordination: Coordination normal       Gait: Gait normal

## 2023-05-12 DIAGNOSIS — F51.01 PRIMARY INSOMNIA: ICD-10-CM

## 2023-05-16 RX ORDER — TRAZODONE HYDROCHLORIDE 100 MG/1
200 TABLET ORAL
Qty: 180 TABLET | Refills: 1 | Status: SHIPPED | OUTPATIENT
Start: 2023-05-16

## 2023-05-25 ENCOUNTER — TELEPHONE (OUTPATIENT)
Dept: FAMILY MEDICINE CLINIC | Facility: CLINIC | Age: 28
End: 2023-05-25

## 2023-05-25 NOTE — TELEPHONE ENCOUNTER
AS PER PATIENT FAXED OFFICE NOTES  FORM APRIL UNTIL MAY THAT HAD TO TO WITH HIS DENTAL PROBLEM TO HIS JOB TO BE CONNECTED WITH THE DISABILITY FORM DR OWENS FILLED OUT

## 2023-07-06 ENCOUNTER — APPOINTMENT (OUTPATIENT)
Dept: LAB | Facility: AMBULARY SURGERY CENTER | Age: 28
End: 2023-07-06
Payer: COMMERCIAL

## 2023-07-06 ENCOUNTER — OFFICE VISIT (OUTPATIENT)
Dept: GASTROENTEROLOGY | Facility: AMBULARY SURGERY CENTER | Age: 28
End: 2023-07-06
Payer: COMMERCIAL

## 2023-07-06 VITALS
WEIGHT: 234.8 LBS | SYSTOLIC BLOOD PRESSURE: 148 MMHG | DIASTOLIC BLOOD PRESSURE: 86 MMHG | OXYGEN SATURATION: 98 % | HEIGHT: 72 IN | HEART RATE: 64 BPM | BODY MASS INDEX: 31.8 KG/M2

## 2023-07-06 DIAGNOSIS — K58.0 IRRITABLE BOWEL SYNDROME WITH DIARRHEA: ICD-10-CM

## 2023-07-06 DIAGNOSIS — R74.8 ELEVATED LIVER ENZYMES: Primary | ICD-10-CM

## 2023-07-06 DIAGNOSIS — K22.70 BARRETT'S ESOPHAGUS WITHOUT DYSPLASIA: ICD-10-CM

## 2023-07-06 DIAGNOSIS — R74.8 ELEVATED LIVER ENZYMES: ICD-10-CM

## 2023-07-06 DIAGNOSIS — F41.9 ANXIETY: ICD-10-CM

## 2023-07-06 DIAGNOSIS — K76.0 NAFLD (NONALCOHOLIC FATTY LIVER DISEASE): ICD-10-CM

## 2023-07-06 DIAGNOSIS — E78.1 HYPERTRIGLYCERIDEMIA: ICD-10-CM

## 2023-07-06 LAB
ALBUMIN SERPL BCP-MCNC: 4.4 G/DL (ref 3.5–5)
ALP SERPL-CCNC: 71 U/L (ref 46–116)
ALT SERPL W P-5'-P-CCNC: 69 U/L (ref 12–78)
ANION GAP SERPL CALCULATED.3IONS-SCNC: 5 MMOL/L
AST SERPL W P-5'-P-CCNC: 34 U/L (ref 5–45)
BASOPHILS # BLD AUTO: 0.03 THOUSANDS/ÂΜL (ref 0–0.1)
BASOPHILS NFR BLD AUTO: 1 % (ref 0–1)
BILIRUB SERPL-MCNC: 0.8 MG/DL (ref 0.2–1)
BUN SERPL-MCNC: 15 MG/DL (ref 5–25)
CALCIUM SERPL-MCNC: 9.4 MG/DL (ref 8.3–10.1)
CHLORIDE SERPL-SCNC: 109 MMOL/L (ref 96–108)
CO2 SERPL-SCNC: 23 MMOL/L (ref 21–32)
CREAT SERPL-MCNC: 0.84 MG/DL (ref 0.6–1.3)
CRP SERPL QL: <3 MG/L
EOSINOPHIL # BLD AUTO: 0.08 THOUSAND/ÂΜL (ref 0–0.61)
EOSINOPHIL NFR BLD AUTO: 1 % (ref 0–6)
ERYTHROCYTE [DISTWIDTH] IN BLOOD BY AUTOMATED COUNT: 11.9 % (ref 11.6–15.1)
ERYTHROCYTE [SEDIMENTATION RATE] IN BLOOD: 3 MM/HOUR (ref 0–14)
GFR SERPL CREATININE-BSD FRML MDRD: 119 ML/MIN/1.73SQ M
GLUCOSE P FAST SERPL-MCNC: 103 MG/DL (ref 65–99)
HCT VFR BLD AUTO: 48.6 % (ref 36.5–49.3)
HGB BLD-MCNC: 16.7 G/DL (ref 12–17)
IMM GRANULOCYTES # BLD AUTO: 0.03 THOUSAND/UL (ref 0–0.2)
IMM GRANULOCYTES NFR BLD AUTO: 1 % (ref 0–2)
INR PPP: 0.88 (ref 0.84–1.19)
LYMPHOCYTES # BLD AUTO: 2.55 THOUSANDS/ÂΜL (ref 0.6–4.47)
LYMPHOCYTES NFR BLD AUTO: 43 % (ref 14–44)
MCH RBC QN AUTO: 31.4 PG (ref 26.8–34.3)
MCHC RBC AUTO-ENTMCNC: 34.4 G/DL (ref 31.4–37.4)
MCV RBC AUTO: 91 FL (ref 82–98)
MONOCYTES # BLD AUTO: 0.47 THOUSAND/ÂΜL (ref 0.17–1.22)
MONOCYTES NFR BLD AUTO: 8 % (ref 4–12)
NEUTROPHILS # BLD AUTO: 2.82 THOUSANDS/ÂΜL (ref 1.85–7.62)
NEUTS SEG NFR BLD AUTO: 46 % (ref 43–75)
NRBC BLD AUTO-RTO: 0 /100 WBCS
PLATELET # BLD AUTO: 255 THOUSANDS/UL (ref 149–390)
PMV BLD AUTO: 11 FL (ref 8.9–12.7)
POTASSIUM SERPL-SCNC: 4 MMOL/L (ref 3.5–5.3)
PROT SERPL-MCNC: 7.4 G/DL (ref 6.4–8.4)
PROTHROMBIN TIME: 12.2 SECONDS (ref 11.6–14.5)
RBC # BLD AUTO: 5.32 MILLION/UL (ref 3.88–5.62)
SODIUM SERPL-SCNC: 137 MMOL/L (ref 135–147)
WBC # BLD AUTO: 5.98 THOUSAND/UL (ref 4.31–10.16)

## 2023-07-06 PROCEDURE — 85610 PROTHROMBIN TIME: CPT

## 2023-07-06 PROCEDURE — 36415 COLL VENOUS BLD VENIPUNCTURE: CPT

## 2023-07-06 PROCEDURE — 99214 OFFICE O/P EST MOD 30 MIN: CPT | Performed by: STUDENT IN AN ORGANIZED HEALTH CARE EDUCATION/TRAINING PROGRAM

## 2023-07-06 PROCEDURE — 85025 COMPLETE CBC W/AUTO DIFF WBC: CPT

## 2023-07-06 PROCEDURE — 86140 C-REACTIVE PROTEIN: CPT

## 2023-07-06 PROCEDURE — 85652 RBC SED RATE AUTOMATED: CPT

## 2023-07-06 PROCEDURE — 80053 COMPREHEN METABOLIC PANEL: CPT

## 2023-07-06 RX ORDER — ALPRAZOLAM 0.25 MG/1
0.25 TABLET ORAL
Qty: 30 TABLET | Refills: 0 | Status: SHIPPED | OUTPATIENT
Start: 2023-07-06

## 2023-07-06 RX ORDER — OMEPRAZOLE 40 MG/1
40 CAPSULE, DELAYED RELEASE ORAL DAILY
Qty: 90 CAPSULE | Refills: 3 | Status: SHIPPED | OUTPATIENT
Start: 2023-07-06 | End: 2024-07-05

## 2023-07-06 NOTE — PROGRESS NOTES
[facility] - Outpatient Consultation  Perfecto Nesbitt 33 y.o. male MRN: [id number]  Encounter: [id number]    PCP:  Jenn Schilling MD, [phone]  Referring Provider:  Shirley Magana*, [phone]    Patient: Perfecto Nesbitt, 1995  Reason for Referral: fatty liver disease     ASSESSMENT/PLAN:  33 y.o. male with history of HTN, dyslipidemia, class II obesity and hepatic steatosis who presents for abnormal liver tests.       He has had abnormal liver tests dating back to May 2021 with moderate elevations in serum transaminases. He had an ultrasound in August 2021 which showed mild hepatomegaly with hepatic steatosis. Etiology was felt to be due to NAFLD given his metabolic risk factors and his negative serologic work-up. He has not completed an ultrasound elastography to complete staging, although my suspicion for advanced liver disease is low given his age. He continues to have epigastric abdominal pain/bloating with loose stools. He had a negative fecal calprotectin in August 2021 but would recommend repeating stool studies with fecal calprotectin and inflammatory markers. If negative, will trial with rifaximin and refer to GI for ongoing management for his IBS. Regarding his liver disease, we previously discussed that weight loss is hernandez in preventing progression of his disease. His weight has fluctuated over the past several months with improvement of his liver tests in December 2022. Would recommend repeating labs today and may need to consider medical weight loss therapy if he is unsuccessful with lifestyle modifications alone. He should avoid interval weight gain, excessive alcohol consumption, and hepatotoxic medications. He will follow-up in 3 months or sooner.   Thank you for the opportunity to consult in his care.     - CBC, CMP and INR  - Needs HAV and HBV vaccination given non-immunity   - Fecal calprotectin and ESR/CRP  - Stool enteric panel, O&P and Cdiff  - US abdomen with elastography  - Consider empiric trial to rifaximin if the above work-up is unrevealing   - PPI daily for BE     Ras Warren MD  Division of Gastroenterology and Hepatology  [address]    ============================================================================  CC/HPI: 33 y.o. male with history of HTN, dyslipidemia, class II obesity and hepatic steatosis who presents for follow up. Interval events  - EGD showed HH and Oden's esophagus for which biopsies did not show dysplasia. Gastric and duodenal biopsies were also negative. - Continues to have abdominal pain with bloating and loose stools. Had prior fecal calprotectin that has been negative. Symptoms have been interfering with his ability to work  - Has gained 18 lbs since last visit. - Serologic work-up has been negative in December 2022      Extended liver history   He has had abnormal liver tests dating back to May 2021 with moderate elevations in serum transaminases. He had an ultrasound in August 2021 which showed mild hepatomegaly with hepatic steatosis. Etiology was felt to be due to NAFLD given his metabolic risk factors although he has not completed his serologic work-up.     He has been working to lose weight and reports walking 2 miles daily. He has cut down on his caloric intake but has not noticed a change in his weight. He reports occasional EtOH during the holidays but nothing in excess. ROS: Complete review of systems otherwise negative.      PAST MEDICAL/SURGICAL HISTORY:  Past Medical History:   Diagnosis Date   • Anxiety    • Fatty liver    • GERD (gastroesophageal reflux disease)    • Hyperlipidemia    • Hypertension         Past Surgical History:   Procedure Laterality Date   • TONSILLECTOMY         FAMILY/SOCIAL HISTORY:  Family History   Problem Relation Age of Onset   • Cancer Mother    • Diabetes Mother    • Hypertension Mother    • Anxiety disorder Mother    • Heart attack Father    • Hypertension Father    • Anxiety disorder Father    • Cancer Maternal Aunt        Social History     Tobacco Use   • Smoking status: Never   • Smokeless tobacco: Never   Vaping Use   • Vaping Use: Never used   Substance Use Topics   • Alcohol use: Not Currently     Comment: holiday/special occassion   • Drug use: Never       MEDICATIONS:  Current Outpatient Medications on File Prior to Visit   Medication Sig Dispense Refill   • albuterol (PROVENTIL HFA,VENTOLIN HFA) 90 mcg/act inhaler INHALE 2 PUFFS BY MOUTH EVERY 6 HOURS AS NEEDED FOR WHEEZING 25.5 g 0   • ALPRAZolam (XANAX) 0.25 mg tablet Take 1 tablet (0.25 mg total) by mouth daily at bedtime as needed for anxiety 30 tablet 0   • cholecalciferol (VITAMIN D3) 1,000 units tablet Take 1 tablet (1,000 Units total) by mouth daily Start after done with the high-dose. 90 tablet 3   • loperamide (IMODIUM) 2 mg capsule Take 1 capsule (2 mg total) by mouth 4 (four) times a day as needed for diarrhea 90 capsule 0   • meclizine (ANTIVERT) 25 mg tablet Take 1 tablet (25 mg total) by mouth every 8 (eight) hours as needed for dizziness 30 tablet 0   • multivitamin (THERAGRAN) TABS Take 1 tablet by mouth daily     • pantoprazole (PROTONIX) 40 mg tablet TAKE 1 TABLET(40 MG) BY MOUTH DAILY 90 tablet 3   • rosuvastatin (CRESTOR) 40 MG tablet Take 1 tablet (40 mg total) by mouth daily 30 tablet 5   • traZODone (DESYREL) 100 mg tablet Take 2 tablets (200 mg total) by mouth daily at bedtime 180 tablet 1   • dexamethasone (DECADRON) 4 mg tablet Take 1 tablet (4 mg total) by mouth daily with breakfast (Patient not taking: Reported on 7/6/2023) 5 tablet 0     No current facility-administered medications on file prior to visit.        Allergies   Allergen Reactions   • Keflex [Cephalexin] Hives   • Penicillins Hives       PHYSICAL EXAM:  /86 (BP Location: Right arm, Patient Position: Sitting, Cuff Size: Standard)   Pulse 64   Ht 6' (1.829 m)   Wt 107 kg (234 lb 12.8 oz)   SpO2 98%   BMI 31.84 kg/m²   GENERAL: NAD, AAO  HEENT: anicteric, OP clear, MMM  ABDOMEN: S/ND/NT, normoactive BS, no hepatomegaly, spleen not palpable  EXTREMITIES: no edema  SKIN: no rashes, no palmar erythema, no spider angiomata   NEURO: normal gait, no tremor, no asterixis     LABS/RADIOLOGY/ENDOSCOPY:  Lab Results   Component Value Date    WBC 5.32 12/15/2022    HGB 16.6 12/15/2022    HCT 49.0 12/15/2022     12/15/2022    GLUF 103 (H) 12/15/2022    BUN 12 12/15/2022    CREATININE 0.85 12/15/2022    K 4.0 12/15/2022     12/15/2022    CO2 27 12/15/2022    ALKPHOS 83 12/15/2022    ALT 52 12/15/2022    AST 16 12/15/2022    CALCIUM 9.3 12/15/2022    EGFR 120 12/15/2022    TRIG 729 (H) 12/15/2022    HDL 29 (L) 12/15/2022     HAV IgG-  HBsAg-  HBcAB-  HBsAb-  HCV Ab-  Ceruloplasmin 19.6  ALEXEI-  AMA-  ASMA-  A1AT-  Quantitative immunoglobulins negative    EGD (2/2/2023)  • S12G33 Oden's esophagus observed where the top of gastric folds are 41 cm from the incisors with 3 tongues and no associated nodule; performed four-quadrant cold forceps biopsy every 2 cm  • Medium hiatal hernia  • The body of the stomach, antrum, duodenal bulb and 2nd part of the duodenum appeared normal. Performed random biopsy using biopsy forceps to rule out celiac disease and H. pylori.     Pathology (2/2023)  A. Duodenum, biopsy:  - Benign duodenal mucosa without significant histopathologic changes. - There is no blunting of the intestinal villi or increased number of intraepithelial lymphocytes to suggest the presence of gluten sensitive enteropathy.     B. Stomach, biopsy:  - Benign gastric antral mucosa with mild chronic nonspecific inflammation.  - No Helicobacter pylori organisms are identified with routine H&E stain.     C. Esophagus, 40 cm, biopsy:  - Gastric type glandular mucosa, negative for intestinal metaplasia, dysplasia or carcinoma.     D.  Esophagus, 38 cm, biopsy:  - Oden esophagus.  - Negative for dysplasia or carcinoma.      E. Esophagus, 37 cm, biopsy:  - Oden esophagus.  - Negative for dysplasia or carcinoma. Computed MELD 3.0 unavailable. Necessary lab results were not found in the last year. Computed MELD-Na unavailable. Necessary lab results were not found in the last year.

## 2023-07-07 DIAGNOSIS — E78.1 HYPERTRIGLYCERIDEMIA: ICD-10-CM

## 2023-07-07 RX ORDER — ROSUVASTATIN CALCIUM 40 MG/1
TABLET, COATED ORAL
Qty: 30 TABLET | Refills: 5 | Status: SHIPPED | OUTPATIENT
Start: 2023-07-07

## 2023-07-11 ENCOUNTER — TELEPHONE (OUTPATIENT)
Age: 28
End: 2023-07-11

## 2023-07-12 NOTE — TELEPHONE ENCOUNTER
Xifaxan denied  You have failed or cannot use a tricyclic antidepressant (for example, amitriptyline)    Request Reference Number: [id number]. XIFAXAN TAB 550MG is denied for not meeting the prior authorization requirement(s). Details of this decision are in the notice attached below or have been faxed to you.    Case ID: [id number]      Payer:  [facility]    [phone]    Electronic appeal:  Not supported     Routing to provider

## 2023-07-14 ENCOUNTER — TELEPHONE (OUTPATIENT)
Dept: GASTROENTEROLOGY | Facility: CLINIC | Age: 28
End: 2023-07-14

## 2023-07-14 NOTE — TELEPHONE ENCOUNTER
Left voicemail for patient to call office. Hi Bill,     Your liver tests are stable but remain mildly elevated and your inflammatory markers were negative. I would work on weight loss for fatty liver disease as discussed in the office.  Please contact me with any questions.     Tashia Hidalgo Counter   Written by Isidro Bolton MD on 7/7/2023  9:43 AM EDT

## 2023-07-18 NOTE — TELEPHONE ENCOUNTER
MEME for Xifaxan rep Earline Lopez) asking her to call me back in regards to possibly obtaining samples for pts IBS-D since [place] was denied.

## 2023-07-20 ENCOUNTER — TELEPHONE (OUTPATIENT)
Dept: FAMILY MEDICINE CLINIC | Facility: CLINIC | Age: 28
End: 2023-07-20

## 2023-07-20 NOTE — TELEPHONE ENCOUNTER
Patient called, he would like to have a letter stating that he needs his emotional support animals. He has four dogs which need to be specified on the letter. He has 3 [employer] and 1 [employer]. Please let me know if you will be able to write this letter.

## 2023-07-21 ENCOUNTER — TELEPHONE (OUTPATIENT)
Age: 28
End: 2023-07-21

## 2023-07-21 NOTE — TELEPHONE ENCOUNTER
Patients GI provider:  Dr. Vaishali Mejia    Number to return call:  [phone]    Reason for call: Pt calling to find out if someone other than him can  his Xifaxan sample. He will be at work the rest of the day.  He states it is ok to leave a message    Scheduled procedure/appointment date if applicable: Appt 0/9/79 (Dr Raeann Stern)

## 2023-07-21 NOTE — TELEPHONE ENCOUNTER
Spoke with patient. Informed him it is fine for someone to  Xifaxan samples for him. I explained that that person would just need to go to  and girls will give them the bag of samples. Marked with his name and  along with directions of how to take.

## 2023-07-21 NOTE — TELEPHONE ENCOUNTER
Spoke with patient. Someone will be picking up samples for him beings he is at work. Explained to him that  person just needs to go to  and ask. Bag is labeled with patient name, , and instructions how to take.

## 2023-07-21 NOTE — TELEPHONE ENCOUNTER
Atempted to call patient for a second time in regards to havoing Xifaxan samples for him here in the office. No answer, so I LVM.

## 2023-08-01 ENCOUNTER — TELEPHONE (OUTPATIENT)
Dept: FAMILY MEDICINE CLINIC | Facility: CLINIC | Age: 28
End: 2023-08-01

## 2023-08-01 NOTE — TELEPHONE ENCOUNTER
Pt called regarding paperwork he needs filled out and when I tried to call him back he did not answer. I LM to call us back to set up an apportionment with us.

## 2023-08-04 ENCOUNTER — TELEPHONE (OUTPATIENT)
Dept: FAMILY MEDICINE CLINIC | Facility: CLINIC | Age: 28
End: 2023-08-04

## 2023-08-04 ENCOUNTER — TELEMEDICINE (OUTPATIENT)
Dept: FAMILY MEDICINE CLINIC | Facility: CLINIC | Age: 28
End: 2023-08-04
Payer: COMMERCIAL

## 2023-08-04 DIAGNOSIS — K52.9 CHRONIC DIARRHEA: Primary | ICD-10-CM

## 2023-08-04 PROCEDURE — 99214 OFFICE O/P EST MOD 30 MIN: CPT | Performed by: INTERNAL MEDICINE

## 2023-08-04 NOTE — LETTER
August 4, 2023     Patient: Dori Billings  YOB: 1995  Date of Visit: 8/4/2023      To Whom it May Concern:    Quyen Nelson is under my professional care. Fredrik Shone was seen in my office on 8/4/2023. Fredrik Shone has long history of anxiety, he uses four emotional support dogs to manage his symptoms. Please allow them to do well with him at the same apartment. Please also refer to previous note that was given 03/28/2023. If you have any questions or concerns, please don't hesitate to call.          Sincerely,          Enriqueta Rawls MD        CC: No Recipients

## 2023-08-04 NOTE — PROGRESS NOTES
Virtual Regular Visit    Verification of patient location:    Patient is located at Home in the following state in which I hold an active license [place]      Assessment/Plan:    Problem List Items Addressed This Visit        Digestive    Chronic diarrhea - Primary     Continue follow-up with gastroenterology. Still experiences frequent episodes of diarrhea daily. He was on multiple meds in the past with no improvement. FMLA forms were filled out for his accommodations. Reason for visit is   Chief Complaint   Patient presents with   • Follow-up     Paper work  Re-do emotional support animal letter   • Virtual Regular Visit        Encounter provider Akilah Devine MD    Provider located at [id number] [address] [facility] [address]  [phone]      Recent Visits  Date Type Provider Dept   08/01/23 Telephone [facility] [facility]   Showing recent visits within past 7 days and meeting all other requirements  Today's Visits  Date Type Provider Dept   08/04/23 Telemedicine Akilah Devine MD [facility]   Showing today's visits and meeting all other requirements  Future Appointments  No visits were found meeting these conditions. Showing future appointments within next 150 days and meeting all other requirements       The patient was identified by name and date of birth. Franchescamonica Purcell was informed that this is a telemedicine visit and that the visit is being conducted through the [employer]. He agrees to proceed. .  My office door was closed. No one else was in the room. He acknowledged consent and understanding of privacy and security of the video platform. The patient has agreed to participate and understands they can discontinue the visit at any time. Patient is aware this is a billable service.      Subjective  Frankie Jazzy is a 33 y.o. male called today for follow-up on his chronic diarrhea and to fill out his FMLA forms. .      Patient called today for follow-up on his chronic diarrhea that he still experiences, she has follow-up with gastroenterology soon. We also filled out his FMLA forms during the appointment. Past Medical History:   Diagnosis Date   • Anxiety    • Fatty liver    • GERD (gastroesophageal reflux disease)    • Hyperlipidemia    • Hypertension        Past Surgical History:   Procedure Laterality Date   • TONSILLECTOMY         Current Outpatient Medications   Medication Sig Dispense Refill   • albuterol (PROVENTIL HFA,VENTOLIN HFA) 90 mcg/act inhaler INHALE 2 PUFFS BY MOUTH EVERY 6 HOURS AS NEEDED FOR WHEEZING 25.5 g 0   • ALPRAZolam (XANAX) 0.25 mg tablet Take 1 tablet (0.25 mg total) by mouth daily at bedtime as needed for anxiety 30 tablet 0   • cholecalciferol (VITAMIN D3) 1,000 units tablet Take 1 tablet (1,000 Units total) by mouth daily Start after done with the high-dose. 90 tablet 3   • loperamide (IMODIUM) 2 mg capsule Take 1 capsule (2 mg total) by mouth 4 (four) times a day as needed for diarrhea 90 capsule 0   • multivitamin (THERAGRAN) TABS Take 1 tablet by mouth daily     • omeprazole (PriLOSEC) 40 MG capsule Take 1 capsule (40 mg total) by mouth daily 90 capsule 3   • rosuvastatin (CRESTOR) 40 MG tablet TAKE 1 TABLET(40 MG) BY MOUTH DAILY 30 tablet 5   • traZODone (DESYREL) 100 mg tablet Take 2 tablets (200 mg total) by mouth daily at bedtime 180 tablet 1   • dexamethasone (DECADRON) 4 mg tablet Take 1 tablet (4 mg total) by mouth daily with breakfast (Patient not taking: Reported on 7/6/2023) 5 tablet 0   • meclizine (ANTIVERT) 25 mg tablet Take 1 tablet (25 mg total) by mouth every 8 (eight) hours as needed for dizziness (Patient not taking: Reported on 8/4/2023) 30 tablet 0     No current facility-administered medications for this visit.         Allergies   Allergen Reactions   • Keflex [Cephalexin] Hives   • Penicillins Hives       Review of Systems   Gastrointestinal: Positive for abdominal distention, abdominal pain and diarrhea. Video Exam    There were no vitals filed for this visit.     Physical Exam     Visit Time  Total Visit Duration: 20 min

## 2023-08-04 NOTE — ASSESSMENT & PLAN NOTE
Continue follow-up with gastroenterology. Still experiences frequent episodes of diarrhea daily. He was on multiple meds in the past with no improvement. FMLA forms were filled out for his accommodations.

## 2023-08-07 ENCOUNTER — HOSPITAL ENCOUNTER (EMERGENCY)
Facility: HOSPITAL | Age: 28
Discharge: HOME | End: 2023-08-08
Attending: EMERGENCY MEDICINE
Payer: COMMERCIAL

## 2023-08-07 VITALS
BODY MASS INDEX: 30.46 KG/M2 | HEIGHT: 72 IN | HEART RATE: 67 BPM | TEMPERATURE: 97.9 F | DIASTOLIC BLOOD PRESSURE: 88 MMHG | SYSTOLIC BLOOD PRESSURE: 139 MMHG | WEIGHT: 224.87 LBS | RESPIRATION RATE: 18 BRPM | OXYGEN SATURATION: 100 %

## 2023-08-07 DIAGNOSIS — L03.90 CELLULITIS: Primary | ICD-10-CM

## 2023-08-07 PROCEDURE — NC001 PR NO CHARGE: Performed by: EMERGENCY MEDICINE

## 2023-08-07 PROCEDURE — 99284 EMERGENCY DEPT VISIT MOD MDM: CPT | Performed by: EMERGENCY MEDICINE

## 2023-08-07 PROCEDURE — 99283 EMERGENCY DEPT VISIT LOW MDM: CPT

## 2023-08-08 RX ORDER — CLINDAMYCIN HYDROCHLORIDE 150 MG/1
450 CAPSULE ORAL 3 TIMES DAILY
Qty: 63 CAPSULE | Refills: 0 | Status: SHIPPED | OUTPATIENT
Start: 2023-08-08 | End: 2023-08-15

## 2023-08-08 NOTE — DISCHARGE INSTRUCTIONS
You have been seen in the emergency department for evaluation of a wound. On examination, it appears that you have a skin infection called cellulitis. We will be prescribing you a course of antibiotics for this infection, pick these up at your pharmacy and complete the entire course. Please return to the ED if you have any worsening symptoms, fevers, or new symptoms.

## 2023-08-08 NOTE — ED PROVIDER NOTES
History  Chief Complaint   Patient presents with   • Wound Check     Pt reporting small wound on right lower leg. Two small circular wounds noted to be on leg. Denies fevers. States they are painful. Patient is a 81-year-old male with no significant past medical history presenting today for evaluation of wounds on his leg. Patient states that 2 wounds appeared on his right ankle about 1 week ago. Since then they have been painful, and itchy, with surrounding redness and warmth. Pain is described as 6 out of 10, located around the wounds which are on the patient's right ankle, with radiation up the leg to the knee. Denies any drainage from the wounds. Patient states that he has been moving recently, and likely scratched his leg on something while he was moving, and also has been cleaning up after a plumbing issue is a potential source for infection. Patient states that his girlfriend, as well as roommate had similar lesions, and were diagnosed with cellulitis. Patient denies any fevers, generalized weakness, or other symptoms at this time. Patient denies taking anything at home for the pain. States that warm showers makes the pain better, but denies any other alleviating or exacerbating factors. Denies any skin infections, or MRSA infections in the past.      History provided by:  Patient      Prior to Admission Medications   Prescriptions Last Dose Informant Patient Reported? Taking? ALPRAZolam (XANAX) 0.25 mg tablet   No No   Sig: Take 1 tablet (0.25 mg total) by mouth daily at bedtime as needed for anxiety   albuterol (PROVENTIL HFA,VENTOLIN HFA) 90 mcg/act inhaler  Self No No   Sig: INHALE 2 PUFFS BY MOUTH EVERY 6 HOURS AS NEEDED FOR WHEEZING   cholecalciferol (VITAMIN D3) 1,000 units tablet  Self No No   Sig: Take 1 tablet (1,000 Units total) by mouth daily Start after done with the high-dose.    dexamethasone (DECADRON) 4 mg tablet  Self No No   Sig: Take 1 tablet (4 mg total) by mouth daily with breakfast   Patient not taking: Reported on 7/6/2023   loperamide (IMODIUM) 2 mg capsule  Self No No   Sig: Take 1 capsule (2 mg total) by mouth 4 (four) times a day as needed for diarrhea   meclizine (ANTIVERT) 25 mg tablet  Self No No   Sig: Take 1 tablet (25 mg total) by mouth every 8 (eight) hours as needed for dizziness   Patient not taking: Reported on 8/4/2023   multivitamin (THERAGRAN) TABS  Self Yes No   Sig: Take 1 tablet by mouth daily   omeprazole (PriLOSEC) 40 MG capsule   No No   Sig: Take 1 capsule (40 mg total) by mouth daily   rosuvastatin (CRESTOR) 40 MG tablet   No No   Sig: TAKE 1 TABLET(40 MG) BY MOUTH DAILY   traZODone (DESYREL) 100 mg tablet   No No   Sig: Take 2 tablets (200 mg total) by mouth daily at bedtime      Facility-Administered Medications: None       Past Medical History:   Diagnosis Date   • Anxiety    • Fatty liver    • GERD (gastroesophageal reflux disease)    • Hyperlipidemia    • Hypertension        Past Surgical History:   Procedure Laterality Date   • TONSILLECTOMY         Family History   Problem Relation Age of Onset   • Cancer Mother    • Diabetes Mother    • Hypertension Mother    • Anxiety disorder Mother    • Heart attack Father    • Hypertension Father    • Anxiety disorder Father    • Cancer Maternal Aunt      I have reviewed and agree with the history as documented. E-Cigarette/Vaping   • E-Cigarette Use Never User      E-Cigarette/Vaping Substances   • Nicotine No    • THC No    • CBD No    • Flavoring No    • Other No    • Unknown No      Social History     Tobacco Use   • Smoking status: Never   • Smokeless tobacco: Never   Vaping Use   • Vaping Use: Never used   Substance Use Topics   • Alcohol use: Not Currently     Comment: holiday/special occassion   • Drug use: Never        Review of Systems   Constitutional: Negative for chills, fatigue and fever. HENT: Negative for congestion. Respiratory: Negative for cough, chest tightness and shortness of breath. Cardiovascular: Negative for chest pain. Gastrointestinal: Negative for abdominal pain, diarrhea, nausea and vomiting. Genitourinary: Negative for difficulty urinating. Skin: Positive for wound. Negative for color change. Neurological: Negative for dizziness, syncope, weakness, numbness and headaches. Physical Exam  ED Triage Vitals [08/07/23 2312]   Temperature Pulse Respirations Blood Pressure SpO2   97.9 °F (36.6 °C) 67 18 139/88 100 %      Temp Source Heart Rate Source Patient Position - Orthostatic VS BP Location FiO2 (%)   Oral Monitor Sitting Right arm --      Pain Score       5             Orthostatic Vital Signs  Vitals:    08/07/23 2312   BP: 139/88   Pulse: 67   Patient Position - Orthostatic VS: Sitting       Physical Exam  Vitals and nursing note reviewed. Constitutional:       General: He is not in acute distress. Appearance: He is not ill-appearing or toxic-appearing. HENT:      Head: Normocephalic and atraumatic. Nose: No congestion. Eyes:      Extraocular Movements: Extraocular movements intact. Cardiovascular:      Rate and Rhythm: Normal rate. Pulmonary:      Effort: Pulmonary effort is normal. No respiratory distress. Musculoskeletal:         General: Normal range of motion. Skin:     General: Skin is warm and dry. Findings: Lesion (2 separate approximately 2 cm lesions located on the patient's right lower ankle, with central scabbing, surrounding erythema, warmth, and tenderness) present. Neurological:      General: No focal deficit present. Mental Status: He is alert.          ED Medications  Medications - No data to display    Diagnostic Studies  Results Reviewed     None                 No orders to display         Procedures  Procedures      ED Course  ED Course as of 08/08/23 0300   Mon Aug 07, 2023   2359 Patient seen and evaluated by me   faby Aug 08, 2023   0008 Patient discharged with oral antibiotic prescription SBIRT 22yo+    Flowsheet Row Most Recent Value   Initial Alcohol Screen: US AUDIT-C     1. How often do you have a drink containing alcohol? 0 Filed at: 08/07/2023 2312   2. How many drinks containing alcohol do you have on a typical day you are drinking? 0 Filed at: 08/07/2023 2312   3a. Male UNDER 65: How often do you have five or more drinks on one occasion? 0 Filed at: 08/07/2023 2312   Audit-C Score 0 Filed at: 08/07/2023 2312   KANE: How many times in the past year have you. .. Used an illegal drug or used a prescription medication for non-medical reasons? Never Filed at: 08/07/2023 2312                Medical Decision Making  Patient is a 76-year-old male with no past medical history, or recent skin infections presenting for evaluation of wounds on his right lower extremity. Given the mechanism of current, as well as exposure to dirty water, concern for a cellulitis. No underlying drainable collection of fluid, no concern for systemic infection given lack of other symptoms. Will discharge on oral antibiotics. Cellulitis: acute illness or injury  Risk  Prescription drug management. Disposition  Final diagnoses:   Cellulitis     Time reflects when diagnosis was documented in both MDM as applicable and the Disposition within this note     Time User Action Codes Description Comment    8/8/2023 12:05 AM Alber Stringer Add [M44.40] Cellulitis       ED Disposition     ED Disposition   Discharge    Condition   Good    Date/Time   Tue Aug 8, 2023 12:08 AM    Comment   [address] discharge to home/self care.                Follow-up Information     Follow up With Specialties Details Why Jamir Corona MD Internal Medicine Schedule an appointment as soon as possible for a visit in 1 week  [address] [place] [address] Jose Enrique.  [address] [place] [id number] [phone]            Discharge Medication List as of 8/8/2023 12:09 AM      START taking these medications    Details   clindamycin (CLEOCIN) 150 mg capsule Take 3 capsules (450 mg total) by mouth 3 (three) times a day for 7 days, Starting Tue 8/8/2023, Until Tue 8/15/2023, Normal         CONTINUE these medications which have NOT CHANGED    Details   albuterol (PROVENTIL HFA,VENTOLIN HFA) 90 mcg/act inhaler INHALE 2 PUFFS BY MOUTH EVERY 6 HOURS AS NEEDED FOR WHEEZING, Normal      ALPRAZolam (XANAX) 0.25 mg tablet Take 1 tablet (0.25 mg total) by mouth daily at bedtime as needed for anxiety, Starting Thu 7/6/2023, Normal      cholecalciferol (VITAMIN D3) 1,000 units tablet Take 1 tablet (1,000 Units total) by mouth daily Start after done with the high-dose., Starting Mon 5/17/2021, Until Fri 8/4/2023, Normal      dexamethasone (DECADRON) 4 mg tablet Take 1 tablet (4 mg total) by mouth daily with breakfast, Starting Wed 4/12/2023, Normal      loperamide (IMODIUM) 2 mg capsule Take 1 capsule (2 mg total) by mouth 4 (four) times a day as needed for diarrhea, Starting Fri 7/15/2022, Normal      meclizine (ANTIVERT) 25 mg tablet Take 1 tablet (25 mg total) by mouth every 8 (eight) hours as needed for dizziness, Starting Tue 1/31/2023, Normal      multivitamin (THERAGRAN) TABS Take 1 tablet by mouth daily, Historical Med      omeprazole (PriLOSEC) 40 MG capsule Take 1 capsule (40 mg total) by mouth daily, Starting Thu 7/6/2023, Until Fri 7/5/2024, Normal      rosuvastatin (CRESTOR) 40 MG tablet TAKE 1 TABLET(40 MG) BY MOUTH DAILY, Normal      traZODone (DESYREL) 100 mg tablet Take 2 tablets (200 mg total) by mouth daily at bedtime, Starting Tue 5/16/2023, Normal           No discharge procedures on file. PDMP Review     None           ED Provider  Attending physically available and evaluated Tanvi Fields. I managed the patient along with the ED Attending.     Electronically Signed by         Arminda Mcintyre MD  08/08/23 5414

## 2023-08-09 ENCOUNTER — OFFICE VISIT (OUTPATIENT)
Dept: GASTROENTEROLOGY | Facility: MEDICAL CENTER | Age: 28
End: 2023-08-09
Payer: COMMERCIAL

## 2023-08-09 VITALS
DIASTOLIC BLOOD PRESSURE: 72 MMHG | WEIGHT: 224.1 LBS | HEIGHT: 72 IN | SYSTOLIC BLOOD PRESSURE: 124 MMHG | BODY MASS INDEX: 30.35 KG/M2 | HEART RATE: 62 BPM | TEMPERATURE: 98.2 F

## 2023-08-09 DIAGNOSIS — K58.0 IRRITABLE BOWEL SYNDROME WITH DIARRHEA: Primary | ICD-10-CM

## 2023-08-09 PROCEDURE — 99214 OFFICE O/P EST MOD 30 MIN: CPT | Performed by: STUDENT IN AN ORGANIZED HEALTH CARE EDUCATION/TRAINING PROGRAM

## 2023-08-09 RX ORDER — PANTOPRAZOLE SODIUM 20 MG/1
20 TABLET, DELAYED RELEASE ORAL DAILY
COMMUNITY

## 2023-08-09 NOTE — PROGRESS NOTES
[facility] - Outpatient Consultation  Tanvi Fields 33 y.o. male MRN: [id number]  Encounter: [id number]      Assessment and Plan:    1. Irritable bowel syndrome with diarrhea        33 y.o. male w/ hx of NAFLD, non-dysplastic long-segment Oden's, anxiety, and IBS-D who is referred to GI for diarrhea. Patient meets Garfield IV criteria for IBS-D, further supported by negative celiac serologies, negative FCP (2021), and reportedly normal colonoscopy in 2019. He has stool studies pending from a prior visit (including stool infectious studies and fecal calprotectin) which I encouraged him to complete. I also added on fecal elastase. In the meantime, I recommended that he try the rifaximin which was previously offered to him. I will reorder this, and if it is prohibitively expensive, we can try to get him samples. - Complete stool infectious studies, fecal elastase, fecal calprotectin  - Trial of rifaximin x 2 weeks  - Obtain records of EGD/colonoscopy 2019 from Dr. Kellee Devries in [facility] in 3 months    Orders Placed This Encounter   Procedures   • Pancreatic elastase, fecal     ______________________________________________________________________    History of Present Illness:    Tanvi Fields is a 33 y.o. male w/ hx of NAFLD, non-dysplastic long-segment Oden's, anxiety, and IBS-D who is referred to GI for diarrhea. Patient reports 3 to 4 years of watery diarrhea 3-4 times per day associated with postprandial urgency and crampy abdominal pain shortly after defecation. Reports tenesmus and significant bloating after eating but denies nocturnal stools or hematochezia. Patient reports having an EGD/colonoscopy 2019 with Dr. Kellee Devries in [facility] for these same symptoms. He reports Oden's was found on this EGD, but colonoscopy was normal (no reports available).     He was following with the GI [facility] up until 10/2022 for IBS-D, and fecal calprotectin 8/2021 was 30. EGD 2/2023 showed C3M4 Oden's without dysplasia and a hiatal hernia (neg H.pylori, neg celiac). Celiac serologies negative 12/2022. He has tried Levsin, IBgard, and fiber supplementation without much improvement. He was ordered for rifaximin which was too expensive, but samples were offered; unfortunately, patient never picked up the samples. Review of Systems:  As per HPI. Otherwise negative.       Historical Information   Past Medical History:   Diagnosis Date   • Anxiety    • Fatty liver    • GERD (gastroesophageal reflux disease)    • Hyperlipidemia    • Hypertension      Past Surgical History:   Procedure Laterality Date   • TONSILLECTOMY       Social History   Social History     Substance and Sexual Activity   Alcohol Use Not Currently    Comment: holiday/special occassion     Social History     Substance and Sexual Activity   Drug Use Never     Social History     Tobacco Use   Smoking Status Never   Smokeless Tobacco Never     Family History   Problem Relation Age of Onset   • Cancer Mother    • Diabetes Mother    • Hypertension Mother    • Anxiety disorder Mother    • Heart attack Father    • Hypertension Father    • Anxiety disorder Father    • Cancer Maternal Aunt        Meds/Allergies       Current Outpatient Medications:   •  ALPRAZolam (XANAX) 0.25 mg tablet  •  clindamycin (CLEOCIN) 150 mg capsule  •  loperamide (IMODIUM) 2 mg capsule  •  multivitamin (THERAGRAN) TABS  •  pantoprazole (PROTONIX) 20 mg tablet  •  rifaximin (XIFAXAN) 550 mg tablet  •  rosuvastatin (CRESTOR) 40 MG tablet  •  traZODone (DESYREL) 100 mg tablet  •  albuterol (PROVENTIL HFA,VENTOLIN HFA) 90 mcg/act inhaler  •  cholecalciferol (VITAMIN D3) 1,000 units tablet  •  dexamethasone (DECADRON) 4 mg tablet  •  meclizine (ANTIVERT) 25 mg tablet    Allergies   Allergen Reactions   • Keflex [Cephalexin] Hives   • Penicillins Hives           Objective     Blood pressure 124/72, pulse 62, temperature 98.2 °F (36.8 °C), temperature source Tympanic, height 6' (1.829 m), weight 102 kg (224 lb 1.6 oz). Body mass index is 30.39 kg/m².         Physical Exam:      General: No acute distress  Abdomen: Soft, non-tender, non-distended, normoactive bowel sounds  Neuro: Awake, alert, oriented x 3    Lab Results:   Lab Results   Component Value Date/Time    WBC 5.98 07/06/2023 11:46 AM    HGB 16.7 07/06/2023 11:46 AM     07/06/2023 11:46 AM    SODIUM 137 07/06/2023 11:46 AM    K 4.0 07/06/2023 11:46 AM     (H) 07/06/2023 11:46 AM    CO2 23 07/06/2023 11:46 AM    BUN 15 07/06/2023 11:46 AM    CREATININE 0.84 07/06/2023 11:46 AM    AST 34 07/06/2023 11:46 AM    ALT 69 07/06/2023 11:46 AM    ALKPHOS 71 07/06/2023 11:46 AM    TBILI 0.80 07/06/2023 11:46 AM    ALB 4.4 07/06/2023 11:46 AM    INR 0.88 07/06/2023 11:46 AM    FERRITIN 397 (H) 12/15/2022 03:17 PM    CONCFE 38 12/15/2022 03:17 PM    TIBC 285 12/15/2022 03:17 PM

## 2023-08-21 DIAGNOSIS — F51.01 PRIMARY INSOMNIA: ICD-10-CM

## 2023-08-21 RX ORDER — TRAZODONE HYDROCHLORIDE 100 MG/1
200 TABLET ORAL
Qty: 180 TABLET | Refills: 1 | Status: SHIPPED | OUTPATIENT
Start: 2023-08-21

## 2023-08-30 ENCOUNTER — OFFICE VISIT (OUTPATIENT)
Dept: FAMILY MEDICINE CLINIC | Facility: CLINIC | Age: 28
End: 2023-08-30
Payer: COMMERCIAL

## 2023-08-30 VITALS
HEIGHT: 70 IN | DIASTOLIC BLOOD PRESSURE: 70 MMHG | WEIGHT: 217 LBS | OXYGEN SATURATION: 97 % | SYSTOLIC BLOOD PRESSURE: 124 MMHG | TEMPERATURE: 97.3 F | BODY MASS INDEX: 31.07 KG/M2 | HEART RATE: 74 BPM

## 2023-08-30 DIAGNOSIS — K52.9 CHRONIC DIARRHEA: Primary | ICD-10-CM

## 2023-08-30 DIAGNOSIS — R42 DIZZINESS: ICD-10-CM

## 2023-08-30 PROCEDURE — 99213 OFFICE O/P EST LOW 20 MIN: CPT | Performed by: INTERNAL MEDICINE

## 2023-08-30 RX ORDER — MECLIZINE HYDROCHLORIDE 25 MG/1
25 TABLET ORAL EVERY 8 HOURS PRN
Qty: 30 TABLET | Refills: 0 | Status: SHIPPED | OUTPATIENT
Start: 2023-08-30

## 2023-08-30 NOTE — PROGRESS NOTES
Assessment/Plan:    No problem-specific Assessment & Plan notes found for this encounter. Diagnoses and all orders for this visit:    Chronic diarrhea    Dizziness  -     meclizine (ANTIVERT) 25 mg tablet; Take 1 tablet (25 mg total) by mouth every 8 (eight) hours as needed for dizziness          FMLA forms were filled out during the visit. Still complains of frequent abdominal pain and diarrhea. Likely due to IBS. Continue follow-up with gastroenterology. Subjective:      Patient ID: Nicolle Sahni is a 33 y.o. male. Patient came today for follow-up on his chronic problems and to fill out forms for his FMLA. The following portions of the patient's history were reviewed and updated as appropriate: allergies, current medications, past family history, past medical history, past social history, past surgical history, and problem list.    Review of Systems   Gastrointestinal: Positive for abdominal distention, abdominal pain and diarrhea.          Objective:      /70 (BP Location: Left arm, Patient Position: Sitting, Cuff Size: Standard)   Pulse 74   Temp (!) 97.3 °F (36.3 °C) (Temporal)   Ht 5' 10" (1.778 m)   Wt 98.4 kg (217 lb)   SpO2 97%   BMI 31.14 kg/m²     Allergies   Allergen Reactions   • Keflex [Cephalexin] Hives   • Penicillins Hives          Current Outpatient Medications:   •  albuterol (PROVENTIL HFA,VENTOLIN HFA) 90 mcg/act inhaler, INHALE 2 PUFFS BY MOUTH EVERY 6 HOURS AS NEEDED FOR WHEEZING, Disp: 25.5 g, Rfl: 0  •  ALPRAZolam (XANAX) 0.25 mg tablet, Take 1 tablet (0.25 mg total) by mouth daily at bedtime as needed for anxiety, Disp: 30 tablet, Rfl: 0  •  cholecalciferol (VITAMIN D3) 1,000 units tablet, Take 1 tablet (1,000 Units total) by mouth daily Start after done with the high-dose., Disp: 90 tablet, Rfl: 3  •  loperamide (IMODIUM) 2 mg capsule, Take 1 capsule (2 mg total) by mouth 4 (four) times a day as needed for diarrhea, Disp: 90 capsule, Rfl: 0  •  meclizine (ANTIVERT) 25 mg tablet, Take 1 tablet (25 mg total) by mouth every 8 (eight) hours as needed for dizziness, Disp: 30 tablet, Rfl: 0  •  multivitamin (THERAGRAN) TABS, Take 1 tablet by mouth daily, Disp: , Rfl:   •  pantoprazole (PROTONIX) 20 mg tablet, Take 20 mg by mouth daily, Disp: , Rfl:   •  rosuvastatin (CRESTOR) 40 MG tablet, TAKE 1 TABLET(40 MG) BY MOUTH DAILY, Disp: 30 tablet, Rfl: 5  •  traZODone (DESYREL) 100 mg tablet, TAKE 2 TABLETS(200 MG) BY MOUTH DAILY AT BEDTIME, Disp: 180 tablet, Rfl: 1     There are no Patient Instructions on file for this visit. Physical Exam  Cardiovascular:      Pulses: Normal pulses. Pulmonary:      Effort: Pulmonary effort is normal.   Neurological:      Mental Status: He is alert.

## 2023-10-12 DIAGNOSIS — F41.9 ANXIETY: ICD-10-CM

## 2023-10-12 DIAGNOSIS — E78.1 HYPERTRIGLYCERIDEMIA: ICD-10-CM

## 2023-10-12 RX ORDER — ALPRAZOLAM 0.25 MG/1
0.25 TABLET ORAL
Qty: 30 TABLET | Refills: 0 | Status: SHIPPED | OUTPATIENT
Start: 2023-10-12

## 2023-10-12 RX ORDER — ROSUVASTATIN CALCIUM 40 MG/1
40 TABLET, COATED ORAL DAILY
Qty: 90 TABLET | Refills: 0 | Status: SHIPPED | OUTPATIENT
Start: 2023-10-12

## 2023-10-25 ENCOUNTER — CONSULT (OUTPATIENT)
Dept: SURGERY | Facility: CLINIC | Age: 28
End: 2023-10-25
Payer: COMMERCIAL

## 2023-10-25 VITALS
DIASTOLIC BLOOD PRESSURE: 84 MMHG | BODY MASS INDEX: 32.87 KG/M2 | SYSTOLIC BLOOD PRESSURE: 142 MMHG | HEART RATE: 94 BPM | TEMPERATURE: 96.4 F | HEIGHT: 70 IN | RESPIRATION RATE: 16 BRPM | WEIGHT: 229.6 LBS

## 2023-10-25 DIAGNOSIS — K21.00 GASTROESOPHAGEAL REFLUX DISEASE WITH ESOPHAGITIS WITHOUT HEMORRHAGE: ICD-10-CM

## 2023-10-25 DIAGNOSIS — K44.9 HIATAL HERNIA: ICD-10-CM

## 2023-10-25 DIAGNOSIS — K22.70 BARRETT'S ESOPHAGUS WITHOUT DYSPLASIA: Primary | ICD-10-CM

## 2023-10-25 PROCEDURE — 99203 OFFICE O/P NEW LOW 30 MIN: CPT | Performed by: SURGERY

## 2023-10-25 RX ORDER — OMEPRAZOLE 20 MG/1
20 CAPSULE, DELAYED RELEASE ORAL DAILY
COMMUNITY

## 2023-10-25 NOTE — PROGRESS NOTES
Assessment/Plan:    No problem-specific Assessment & Plan notes found for this encounter. Diagnoses and all orders for this visit:    Oden's esophagus with dysplasia  -     Ambulatory Referral to General Surgery    Gastroesophageal reflux disease with esophagitis without hemorrhage  -     Ambulatory Referral to General Surgery    Hiatal hernia  -     Ambulatory Referral to General Surgery    Other orders  -     omeprazole (PriLOSEC) 20 mg delayed release capsule; Take 20 mg by mouth daily          Subjective:      Patient ID: Tiffanie Valentino is a 33 y.o. male.     HPI    The following portions of the patient's history were reviewed and updated as appropriate: allergies, current medications, past family history, past medical history, past social history, past surgical history, and problem list.    Review of Systems      Objective:      /84   Pulse 94   Temp (!) 96.4 °F (35.8 °C)   Resp 16   Ht 5' 10" (1.778 m)   Wt 104 kg (229 lb 9.6 oz)   BMI 32.94 kg/m²          Physical Exam

## 2023-10-25 NOTE — ASSESSMENT & PLAN NOTE
I reviewed his recent EGD and the pathology results. There is evidence of long-segment Oden's but the pathology showed Oden's without dysplasia. I also looked at a CT scan from 2021 and there was no significant hiatal hernia at that time. I discussed this with him and currently he is asymptomatic as far as reflux is concerned and feels well controlled with medication. Explained to him that an option for reflux would be surgical which would require robotic repair of any small hiatal hernia if present followed by a Nissen fundoplication. This would help decrease the acid load into his lower esophagus. However he currently is maintained on his medication is asymptomatic. This been no progression of his Oden's esophagus. He is not interested in surgery at this time. He is more concerned about his lower abdominal pains that he follows with gastroenterology. I explained to him that if he begins to become symptomatic or not want to take medication longer he can follow-up at any time for reevaluation for a fundoplication.

## 2023-10-25 NOTE — PROGRESS NOTES
Assessment/Plan:    Oden's esophagus without dysplasia  I reviewed his recent EGD and the pathology results. There is evidence of long-segment Oden's but the pathology showed Oden's without dysplasia. I also looked at a CT scan from 2021 and there was no significant hiatal hernia at that time. I discussed this with him and currently he is asymptomatic as far as reflux is concerned and feels well controlled with medication. Explained to him that an option for reflux would be surgical which would require robotic repair of any small hiatal hernia if present followed by a Nissen fundoplication. This would help decrease the acid load into his lower esophagus. However he currently is maintained on his medication is asymptomatic. This been no progression of his Oden's esophagus. He is not interested in surgery at this time. He is more concerned about his lower abdominal pains that he follows with gastroenterology. I explained to him that if he begins to become symptomatic or not want to take medication longer he can follow-up at any time for reevaluation for a fundoplication. Diagnoses and all orders for this visit:    Oden's esophagus without dysplasia    Gastroesophageal reflux disease with esophagitis without hemorrhage  -     Ambulatory Referral to General Surgery    Hiatal hernia  -     Ambulatory Referral to General Surgery    Other orders  -     omeprazole (PriLOSEC) 20 mg delayed release capsule; Take 20 mg by mouth daily          Subjective:      Patient ID: Roxanna Sicard is a 33 y.o. male. Mr. Leyla Roman is a 33 old gentleman here for evaluation of Oden's esophagus and hiatal hernia. He states he had endoscopy in 2018 and was diagnosed with Oden's esophagus. He has been maintained on PPI since then. He had a recent endoscopy that showed Oden esophagus but no evidence of dysplasia. Possibly small hiatal hernia and he was referred here for evaluation.   In review of symptoms he denies epigastric pain. Denies heartburn or acid type symptoms. He denies any reflux symptoms. He feels is well controlled with medication. His main complaints are lower abdominal pains constipation and diarrhea for which she follows with gastroenterology. The following portions of the patient's history were reviewed and updated as appropriate: allergies, current medications, past family history, past medical history, past social history, past surgical history, and problem list.    Review of Systems   Constitutional:  Negative for chills and fever. HENT:  Negative for ear pain and sore throat. Eyes:  Negative for pain and visual disturbance. Respiratory:  Negative for cough and shortness of breath. Cardiovascular:  Negative for chest pain and palpitations. Gastrointestinal:  Positive for abdominal pain and diarrhea. Negative for vomiting. Genitourinary:  Negative for dysuria and hematuria. Musculoskeletal:  Negative for arthralgias and back pain. Skin:  Negative for color change and rash. Neurological:  Negative for seizures and syncope. Psychiatric/Behavioral:  Negative for agitation and behavioral problems. All other systems reviewed and are negative. Objective:      /84   Pulse 94   Temp (!) 96.4 °F (35.8 °C)   Resp 16   Ht 5' 10" (1.778 m)   Wt 104 kg (229 lb 9.6 oz)   BMI 32.94 kg/m²          Physical Exam  Vitals and nursing note reviewed. Exam conducted with a chaperone present. Constitutional:       Appearance: Normal appearance. Cardiovascular:      Rate and Rhythm: Normal rate and regular rhythm. Pulmonary:      Effort: Pulmonary effort is normal.      Breath sounds: Normal breath sounds. Abdominal:      General: There is no distension. Palpations: Abdomen is soft. There is no mass. Tenderness: There is no abdominal tenderness. Neurological:      Mental Status: He is alert.    Psychiatric:         Mood and Affect: Mood normal. Behavior: Behavior normal.

## 2023-11-30 DIAGNOSIS — E78.1 HYPERTRIGLYCERIDEMIA: ICD-10-CM

## 2023-11-30 RX ORDER — ROSUVASTATIN CALCIUM 40 MG/1
40 TABLET, COATED ORAL DAILY
Qty: 90 TABLET | Refills: 0 | Status: SHIPPED | OUTPATIENT
Start: 2023-11-30

## 2023-11-30 NOTE — TELEPHONE ENCOUNTER
11-30-23 / 4:11 PM    Pt called in stating that his dogs ate his bottle of Rosuvastatin 40 MG, and now he doesn't have any more. He is requesting another refill.

## 2023-12-13 ENCOUNTER — TELEPHONE (OUTPATIENT)
Dept: GASTROENTEROLOGY | Facility: CLINIC | Age: 28
End: 2023-12-13

## 2023-12-13 NOTE — TELEPHONE ENCOUNTER
Called patient re his missed 12/13 appnt with [facility]. LVM to call so that we can assist pt with rescheduling his missed appnt. Sent letter.

## 2023-12-20 ENCOUNTER — OFFICE VISIT (OUTPATIENT)
Dept: FAMILY MEDICINE CLINIC | Facility: CLINIC | Age: 28
End: 2023-12-20
Payer: COMMERCIAL

## 2023-12-20 ENCOUNTER — TELEPHONE (OUTPATIENT)
Dept: FAMILY MEDICINE CLINIC | Facility: CLINIC | Age: 28
End: 2023-12-20

## 2023-12-20 VITALS
HEIGHT: 70 IN | HEART RATE: 100 BPM | SYSTOLIC BLOOD PRESSURE: 130 MMHG | RESPIRATION RATE: 12 BRPM | WEIGHT: 235.2 LBS | TEMPERATURE: 99.5 F | BODY MASS INDEX: 33.67 KG/M2 | OXYGEN SATURATION: 98 % | DIASTOLIC BLOOD PRESSURE: 86 MMHG

## 2023-12-20 DIAGNOSIS — R05.1 ACUTE COUGH: ICD-10-CM

## 2023-12-20 DIAGNOSIS — K52.9 CHRONIC DIARRHEA: ICD-10-CM

## 2023-12-20 DIAGNOSIS — F41.9 ANXIETY: ICD-10-CM

## 2023-12-20 DIAGNOSIS — K21.00 GASTROESOPHAGEAL REFLUX DISEASE WITH ESOPHAGITIS, UNSPECIFIED WHETHER HEMORRHAGE: ICD-10-CM

## 2023-12-20 DIAGNOSIS — J02.9 SORE THROAT: Primary | ICD-10-CM

## 2023-12-20 LAB
SARS-COV-2 AG UPPER RESP QL IA: NEGATIVE
SL AMB POCT RAPID FLU A: NORMAL
SL AMB POCT RAPID FLU B: NORMAL
VALID CONTROL: NORMAL

## 2023-12-20 PROCEDURE — 87811 SARS-COV-2 COVID19 W/OPTIC: CPT | Performed by: INTERNAL MEDICINE

## 2023-12-20 PROCEDURE — 99214 OFFICE O/P EST MOD 30 MIN: CPT | Performed by: INTERNAL MEDICINE

## 2023-12-20 PROCEDURE — 87804 INFLUENZA ASSAY W/OPTIC: CPT | Performed by: INTERNAL MEDICINE

## 2023-12-20 RX ORDER — ALPRAZOLAM 0.25 MG/1
0.25 TABLET ORAL
Qty: 30 TABLET | Refills: 0 | Status: SHIPPED | OUTPATIENT
Start: 2023-12-20

## 2023-12-20 RX ORDER — BENZONATATE 200 MG/1
200 CAPSULE ORAL 3 TIMES DAILY PRN
Qty: 30 CAPSULE | Refills: 0 | Status: SHIPPED | OUTPATIENT
Start: 2023-12-20

## 2023-12-20 RX ORDER — OMEPRAZOLE 20 MG/1
20 CAPSULE, DELAYED RELEASE ORAL DAILY
Qty: 90 CAPSULE | Refills: 1 | Status: SHIPPED | OUTPATIENT
Start: 2023-12-20

## 2023-12-20 RX ORDER — AZITHROMYCIN 250 MG/1
TABLET, FILM COATED ORAL DAILY
Qty: 6 TABLET | Refills: 0 | Status: SHIPPED | OUTPATIENT
Start: 2023-12-20 | End: 2023-12-24

## 2023-12-20 NOTE — ASSESSMENT & PLAN NOTE
Likely due to URI, negative for flu and COVID-19 today in the office.  Continue with symptomatic management, will add benzonatate 3 times daily as needed, if his symptoms will not improve within the next 2 or 3 days he will start azithromycin that was sent to his pharmacy.

## 2023-12-20 NOTE — PROGRESS NOTES
Assessment/Plan:    Anxiety  He uses Xanax daily as needed to help with his anxiety and sleep, he use it very sparingly.  Denies any side effects.  UDS was obtained today.    Chronic diarrhea  Likely due to IBS-D, he is at family's forms were updated today to give him more time off as needed during his exacerbations.    Acute cough  Likely due to URI, negative for flu and COVID-19 today in the office.  Continue with symptomatic management, will add benzonatate 3 times daily as needed, if his symptoms will not improve within the next 2 or 3 days he will start azithromycin that was sent to his pharmacy.       Diagnoses and all orders for this visit:    Sore throat  -     POCT Rapid Covid Ag  -     POCT rapid flu A and B    Acute cough  -     benzonatate (TESSALON) 200 MG capsule; Take 1 capsule (200 mg total) by mouth 3 (three) times a day as needed for cough  -     azithromycin (Zithromax) 250 mg tablet; Take 2 tablets (500 mg total) by mouth daily for 1 day, THEN 1 tablet (250 mg total) daily for 4 days.    Gastroesophageal reflux disease with esophagitis, unspecified whether hemorrhage  -     omeprazole (PriLOSEC) 20 mg delayed release capsule; Take 1 capsule (20 mg total) by mouth daily    Anxiety  -     [facility] All Prescribed Meds and Special Instructions  -     Amphetamines, Methamphetamines  -     Butalbital  -     Phenobarbital  -     Secobarbital  -     Alprazolam  -     Clonazepam  -     Diazepam  -     Lorazepam  -     Gabapentin  -     Pregabalin  -     Cocaine  -     Heroin  -     Buprenorphine  -     Levorphanol  -     Meperidine  -     Naltrexone  -     Fentanyl  -     Methadone  -     Oxycodone  -     Tapentadol  -     THC  -     Tramadol  -     Codeine, Hydrocodone, Hydropmorphone, Morphine  -     Bath Salts  -     Ethyl Glucuronide/Ethyl Sulfate  -     Kratom  -     Spice  -     Methylphenidate  -     Phentermine  -     Validity Oxidant  -     Validity Creatinine  -     Validity pH  -     Validity  "Specific  -     ALPRAZolam (XANAX) 0.25 mg tablet; Take 1 tablet (0.25 mg total) by mouth daily at bedtime as needed for anxiety    Chronic diarrhea          Subjective:      Patient ID: Bill Conway is a 28 y.o. male.    Bill came today with complaints of cough and nasal congestion that started about 7 days ago and was not getting better.  He also came for follow-up to update his FMLA forms and for his anxiety.        The following portions of the patient's history were reviewed and updated as appropriate: allergies, current medications, past family history, past medical history, past social history, past surgical history, and problem list.    Review of Systems   Constitutional:  Positive for fatigue. Negative for appetite change, chills and fever.   HENT:  Positive for rhinorrhea and sinus pressure. Negative for sinus pain and sore throat.    Respiratory:  Positive for cough. Negative for chest tightness and shortness of breath.    Cardiovascular:  Negative for chest pain.   Gastrointestinal:  Positive for diarrhea. Negative for nausea and vomiting.   Musculoskeletal:  Positive for myalgias. Negative for arthralgias.         Objective:      /86 (BP Location: Left arm, Patient Position: Sitting, Cuff Size: Standard)   Pulse 100   Temp 99.5 °F (37.5 °C)   Resp 12   Ht 5' 10\" (1.778 m)   Wt 107 kg (235 lb 3.2 oz)   SpO2 98%   BMI 33.75 kg/m²     Allergies   Allergen Reactions    Keflex [Cephalexin] Hives    Penicillins Hives          Current Outpatient Medications:     ALPRAZolam (XANAX) 0.25 mg tablet, Take 1 tablet (0.25 mg total) by mouth daily at bedtime as needed for anxiety, Disp: 30 tablet, Rfl: 0    azithromycin (Zithromax) 250 mg tablet, Take 2 tablets (500 mg total) by mouth daily for 1 day, THEN 1 tablet (250 mg total) daily for 4 days., Disp: 6 tablet, Rfl: 0    benzonatate (TESSALON) 200 MG capsule, Take 1 capsule (200 mg total) by mouth 3 (three) times a day as needed for cough, Disp: 30 " capsule, Rfl: 0    omeprazole (PriLOSEC) 20 mg delayed release capsule, Take 1 capsule (20 mg total) by mouth daily, Disp: 90 capsule, Rfl: 1    albuterol (PROVENTIL HFA,VENTOLIN HFA) 90 mcg/act inhaler, INHALE 2 PUFFS BY MOUTH EVERY 6 HOURS AS NEEDED FOR WHEEZING, Disp: 25.5 g, Rfl: 0    cholecalciferol (VITAMIN D3) 1,000 units tablet, Take 1 tablet (1,000 Units total) by mouth daily Start after done with the high-dose., Disp: 90 tablet, Rfl: 3    loperamide (IMODIUM) 2 mg capsule, Take 1 capsule (2 mg total) by mouth 4 (four) times a day as needed for diarrhea, Disp: 90 capsule, Rfl: 0    meclizine (ANTIVERT) 25 mg tablet, Take 1 tablet (25 mg total) by mouth every 8 (eight) hours as needed for dizziness, Disp: 30 tablet, Rfl: 0    multivitamin (THERAGRAN) TABS, Take 1 tablet by mouth daily, Disp: , Rfl:     rosuvastatin (CRESTOR) 40 MG tablet, Take 1 tablet (40 mg total) by mouth daily, Disp: 90 tablet, Rfl: 0    traZODone (DESYREL) 100 mg tablet, TAKE 2 TABLETS(200 MG) BY MOUTH DAILY AT BEDTIME, Disp: 180 tablet, Rfl: 1     There are no Patient Instructions on file for this visit.        Physical Exam

## 2023-12-20 NOTE — ASSESSMENT & PLAN NOTE
Likely due to IBS-D, he is at family's forms were updated today to give him more time off as needed during his exacerbations.

## 2023-12-20 NOTE — ASSESSMENT & PLAN NOTE
He uses Xanax daily as needed to help with his anxiety and sleep, he use it very sparingly.  Denies any side effects.  UDS was obtained today.

## 2023-12-22 LAB
6MAM UR QL CFM: NEGATIVE NG/ML
7AMINOCLONAZEPAM UR QL CFM: NEGATIVE NG/ML
A-OH ALPRAZ UR QL CFM: ABNORMAL NG/ML
ACCEPTABLE CREAT UR QL: NORMAL MG/DL
ACCEPTIBLE SP GR UR QL: NORMAL
AMPHET UR QL CFM: NEGATIVE NG/ML
BUPRENORPHINE UR QL CFM: NEGATIVE NG/ML
BUTALBITAL UR QL CFM: NEGATIVE NG/ML
BZE UR QL CFM: NEGATIVE NG/ML
CODEINE UR QL CFM: NEGATIVE NG/ML
EDDP UR QL CFM: NEGATIVE NG/ML
ETHYL GLUCURONIDE UR QL CFM: NEGATIVE NG/ML
ETHYL SULFATE UR QL SCN: NEGATIVE NG/ML
EUTYLONE UR QL: NEGATIVE NG/ML
FENTANYL UR QL CFM: NEGATIVE NG/ML
GLIADIN IGG SER IA-ACNC: NEGATIVE NG/ML
HYDROCODONE UR QL CFM: NEGATIVE NG/ML
HYDROMORPHONE UR QL CFM: NEGATIVE NG/ML
LORAZEPAM UR QL CFM: NEGATIVE NG/ML
ME-PHENIDATE UR QL CFM: NEGATIVE NG/ML
MEPERIDINE UR QL CFM: NEGATIVE NG/ML
METHADONE UR QL CFM: NEGATIVE NG/ML
METHAMPHET UR QL CFM: NEGATIVE NG/ML
MORPHINE UR QL CFM: NEGATIVE NG/ML
NALTREXONE UR QL CFM: NEGATIVE NG/ML
NITRITE UR QL: NORMAL UG/ML
NORBUPRENORPHINE UR QL CFM: NEGATIVE NG/ML
NORDIAZEPAM UR QL CFM: NEGATIVE NG/ML
NORFENTANYL UR QL CFM: NEGATIVE NG/ML
NORHYDROCODONE UR QL CFM: NEGATIVE NG/ML
NORMEPERIDINE UR QL CFM: NEGATIVE NG/ML
NOROXYCODONE UR QL CFM: NEGATIVE NG/ML
OXAZEPAM UR QL CFM: NEGATIVE NG/ML
OXYCODONE UR QL CFM: NEGATIVE NG/ML
OXYMORPHONE UR QL CFM: NEGATIVE NG/ML
PARA-FLUOROFENTANYL QUANTIFICATION: NORMAL NG/ML
PHENOBARB UR QL CFM: NEGATIVE NG/ML
RESULT ALL_PRESCRIBED MEDS AND SPECIAL INSTRUCTIONS: NORMAL
SECOBARBITAL UR QL CFM: NEGATIVE NG/ML
SL AMB 4-ANPP QUANTIFICATION: NORMAL NG/ML
SL AMB 5F-ADB-M7 METABOLITE QUANTIFICATION: NEGATIVE NG/ML
SL AMB 7-OH-MITRAGYNINE (KRATOM ALKALOID) QUANTIFICATION: NEGATIVE NG/ML
SL AMB AB-FUBINACA-M3 METABOLITE QUANTIFICATION: NEGATIVE NG/ML
SL AMB ACETYL FENTANYL QUANTIFICATION: NORMAL NG/ML
SL AMB ACETYL NORFENTANYL QUANTIFICATION: NORMAL NG/ML
SL AMB ACRYL FENTANYL QUANTIFICATION: NORMAL NG/ML
SL AMB CARFENTANIL QUANTIFICATION: NORMAL NG/ML
SL AMB CTHC (MARIJUANA METABOLITE) QUANTIFICATION: NEGATIVE NG/ML
SL AMB DEXTRORPHAN (DEXTROMETHORPHAN METABOLITE) QUANT: ABNORMAL NG/ML
SL AMB GABAPENTIN QUANTIFICATION: NEGATIVE
SL AMB JWH018 METABOLITE QUANTIFICATION: NEGATIVE NG/ML
SL AMB JWH073 METABOLITE QUANTIFICATION: NEGATIVE NG/ML
SL AMB MDMB-FUBINACA-M1 METABOLITE QUANTIFICATION: NEGATIVE NG/ML
SL AMB METHYLONE QUANTIFICATION: NEGATIVE NG/ML
SL AMB N-DESMETHYL-TRAMADOL QUANTIFICATION: NEGATIVE NG/ML
SL AMB PHENTERMINE QUANTIFICATION: NEGATIVE NG/ML
SL AMB PREGABALIN QUANTIFICATION: NEGATIVE
SL AMB RCS4 METABOLITE QUANTIFICATION: NEGATIVE NG/ML
SL AMB RITALINIC ACID QUANTIFICATION: NEGATIVE NG/ML
SMOOTH MUSCLE AB TITR SER IF: NEGATIVE NG/ML
SPECIMEN DRAWN SERPL: NEGATIVE NG/ML
SPECIMEN PH ACCEPTABLE UR: NORMAL
TAPENTADOL UR QL CFM: NEGATIVE NG/ML
TEMAZEPAM UR QL CFM: NEGATIVE NG/ML
TRAMADOL UR QL CFM: NEGATIVE NG/ML
URATE/CREAT 24H UR: NEGATIVE NG/ML

## 2024-02-05 ENCOUNTER — TELEPHONE (OUTPATIENT)
Dept: FAMILY MEDICINE CLINIC | Facility: CLINIC | Age: 29
End: 2024-02-05

## 2024-02-05 NOTE — TELEPHONE ENCOUNTER
Received fax for [facility] paperwork from [employer] ([employer]) 2/2/24 at 6:31 PM.  Paperwork was given to Dr. Campuzano.  Pt has appt today (2-5-24 @ 2:00 PM) with Dr. Campuzano.  As soon as the paperwork is filled out, the paperwork will be faxed.

## 2024-02-12 ENCOUNTER — TELEPHONE (OUTPATIENT)
Dept: FAMILY MEDICINE CLINIC | Facility: CLINIC | Age: 29
End: 2024-02-12

## 2024-02-12 ENCOUNTER — OFFICE VISIT (OUTPATIENT)
Dept: FAMILY MEDICINE CLINIC | Facility: CLINIC | Age: 29
End: 2024-02-12
Payer: COMMERCIAL

## 2024-02-12 VITALS
HEIGHT: 70 IN | OXYGEN SATURATION: 96 % | TEMPERATURE: 98.4 F | WEIGHT: 234 LBS | BODY MASS INDEX: 33.5 KG/M2 | HEART RATE: 90 BPM | DIASTOLIC BLOOD PRESSURE: 90 MMHG | SYSTOLIC BLOOD PRESSURE: 136 MMHG

## 2024-02-12 DIAGNOSIS — E78.2 MIXED HYPERLIPIDEMIA: ICD-10-CM

## 2024-02-12 DIAGNOSIS — Z13.0 SCREENING FOR DEFICIENCY ANEMIA: ICD-10-CM

## 2024-02-12 DIAGNOSIS — K52.9 CHRONIC DIARRHEA: ICD-10-CM

## 2024-02-12 DIAGNOSIS — Z11.4 SCREENING FOR HIV (HUMAN IMMUNODEFICIENCY VIRUS): ICD-10-CM

## 2024-02-12 DIAGNOSIS — R51.9 NONINTRACTABLE HEADACHE, UNSPECIFIED CHRONICITY PATTERN, UNSPECIFIED HEADACHE TYPE: ICD-10-CM

## 2024-02-12 DIAGNOSIS — Z13.29 SCREENING FOR HYPOTHYROIDISM: ICD-10-CM

## 2024-02-12 DIAGNOSIS — Z13.1 SCREENING FOR DIABETES MELLITUS: ICD-10-CM

## 2024-02-12 DIAGNOSIS — E55.9 VITAMIN D DEFICIENCY: ICD-10-CM

## 2024-02-12 DIAGNOSIS — E78.1 HYPERTRIGLYCERIDEMIA: Primary | ICD-10-CM

## 2024-02-12 DIAGNOSIS — Z13.89 SCREENING FOR HEMATURIA OR PROTEINURIA: ICD-10-CM

## 2024-02-12 PROCEDURE — 99214 OFFICE O/P EST MOD 30 MIN: CPT | Performed by: INTERNAL MEDICINE

## 2024-02-12 NOTE — ASSESSMENT & PLAN NOTE
Reports headaches for about 2 weeks, denies any nausea, vision changes, mental status changes associated with that.  It is responsive to as needed Excedrin.  He reported that recently his stress level increased due to problems at work and he thinks it is due to that.  Continue to observe, he will let me know if no improvement in few weeks or any new symptoms arise.

## 2024-02-12 NOTE — LETTER
February 12, 2024     Patient: Bill Conway  YOB: 1995  Date of Visit: 2/12/2024      To Whom it May Concern:    Bill Conway is under my professional care. Bill was seen in my office on 2/12/2024. Bill can go back to work on 2/14/2024 with no restrictions.    If you have any questions or concerns, please don't hesitate to call.         Sincerely,          Joshua Campuzano MD        CC: No Recipients

## 2024-02-12 NOTE — PROGRESS NOTES
Assessment/Plan:    Chronic diarrhea  Patient suffers from chronic diarrhea likely due to IBS, he follows up with gastroenterology.  He is currently on as needed Imodium.  He came today to fill out his FMLA forms that was done during the visit, he is asking for breaks that he uses for bathroom.    Mixed hyperlipidemia  Recheck his lipid panel.  He is currently on Crestor.    Nonintractable headache  Reports headaches for about 2 weeks, denies any nausea, vision changes, mental status changes associated with that.  It is responsive to as needed Excedrin.  He reported that recently his stress level increased due to problems at work and he thinks it is due to that.  Continue to observe, he will let me know if no improvement in few weeks or any new symptoms arise.       Diagnoses and all orders for this visit:    Hypertriglyceridemia    Screening for HIV (human immunodeficiency virus)  -     HIV 1/2 AG/AB w Reflex [facility] for 2 yr old and above; Future    Screening for deficiency anemia  -     CBC and differential; Future    Screening for hypothyroidism  -     TSH, 3rd generation with Free T4 reflex; Future    Mixed hyperlipidemia  -     Comprehensive metabolic panel; Future  -     Lipid panel; Future    Vitamin D deficiency  -     Vitamin D 25 hydroxy; Future    Screening for hematuria or proteinuria  -     UA (URINE) with reflex to Scope; Future    Screening for diabetes mellitus  -     Hemoglobin A1C; Future    Chronic diarrhea    Nonintractable headache, unspecified chronicity pattern, unspecified headache type          Subjective:      Patient ID: Bill Conway is a 28 y.o. male.    Bill came today for follow-up on his chronic problems and with some new complaints of headaches that he developed about few weeks ago.    Headache  Fatigue  Associated symptoms include abdominal pain, coughing, fatigue and headaches. Pertinent negatives include no chest pain, chills, nausea or vomiting.   Cough  Associated symptoms include  "headaches. Pertinent negatives include no chest pain, chills, shortness of breath or wheezing.       The following portions of the patient's history were reviewed and updated as appropriate: allergies, current medications, past family history, past medical history, past social history, past surgical history, and problem list.    Review of Systems   Constitutional:  Positive for fatigue. Negative for chills.   Respiratory:  Positive for cough. Negative for shortness of breath, wheezing and stridor.    Cardiovascular:  Negative for chest pain, palpitations and leg swelling.   Gastrointestinal:  Positive for abdominal distention, abdominal pain and diarrhea. Negative for blood in stool, nausea and vomiting.   Neurological:  Positive for headaches.         Objective:      /90 (BP Location: Left arm, Patient Position: Sitting, Cuff Size: Large)   Pulse 90   Temp 98.4 °F (36.9 °C) (Tympanic)   Ht 5' 10\" (1.778 m)   Wt 106 kg (234 lb)   SpO2 96%   BMI 33.58 kg/m²     Allergies   Allergen Reactions    Keflex [Cephalexin] Hives    Penicillins Hives          Current Outpatient Medications:     albuterol (PROVENTIL HFA,VENTOLIN HFA) 90 mcg/act inhaler, INHALE 2 PUFFS BY MOUTH EVERY 6 HOURS AS NEEDED FOR WHEEZING, Disp: 25.5 g, Rfl: 0    ALPRAZolam (XANAX) 0.25 mg tablet, Take 1 tablet (0.25 mg total) by mouth daily at bedtime as needed for anxiety, Disp: 30 tablet, Rfl: 0    benzonatate (TESSALON) 200 MG capsule, Take 1 capsule (200 mg total) by mouth 3 (three) times a day as needed for cough, Disp: 30 capsule, Rfl: 0    loperamide (IMODIUM) 2 mg capsule, Take 1 capsule (2 mg total) by mouth 4 (four) times a day as needed for diarrhea, Disp: 90 capsule, Rfl: 0    meclizine (ANTIVERT) 25 mg tablet, Take 1 tablet (25 mg total) by mouth every 8 (eight) hours as needed for dizziness, Disp: 30 tablet, Rfl: 0    multivitamin (THERAGRAN) TABS, Take 1 tablet by mouth daily, Disp: , Rfl:     omeprazole (PriLOSEC) 20 mg " delayed release capsule, Take 1 capsule (20 mg total) by mouth daily, Disp: 90 capsule, Rfl: 1    rosuvastatin (CRESTOR) 40 MG tablet, Take 1 tablet (40 mg total) by mouth daily, Disp: 90 tablet, Rfl: 0    traZODone (DESYREL) 100 mg tablet, TAKE 2 TABLETS(200 MG) BY MOUTH DAILY AT BEDTIME, Disp: 180 tablet, Rfl: 1    cholecalciferol (VITAMIN D3) 1,000 units tablet, Take 1 tablet (1,000 Units total) by mouth daily Start after done with the high-dose., Disp: 90 tablet, Rfl: 3     There are no Patient Instructions on file for this visit.        Physical Exam  Constitutional:       General: He is not in acute distress.     Appearance: He is not ill-appearing or toxic-appearing.   Cardiovascular:      Rate and Rhythm: Normal rate.      Pulses: Normal pulses.   Pulmonary:      Effort: Pulmonary effort is normal.

## 2024-02-12 NOTE — ASSESSMENT & PLAN NOTE
Patient suffers from chronic diarrhea likely due to IBS, he follows up with gastroenterology.  He is currently on as needed Imodium.  He came today to fill out his FMLA forms that was done during the visit, he is asking for breaks that he uses for bathroom.

## 2024-02-18 PROBLEM — R05.1 ACUTE COUGH: Status: RESOLVED | Noted: 2023-12-20 | Resolved: 2024-02-18

## 2024-03-08 DIAGNOSIS — E78.1 HYPERTRIGLYCERIDEMIA: ICD-10-CM

## 2024-03-08 DIAGNOSIS — F41.9 ANXIETY: ICD-10-CM

## 2024-03-08 RX ORDER — ROSUVASTATIN CALCIUM 40 MG/1
40 TABLET, COATED ORAL DAILY
Qty: 90 TABLET | Refills: 0 | Status: SHIPPED | OUTPATIENT
Start: 2024-03-08

## 2024-03-08 NOTE — TELEPHONE ENCOUNTER
Reason for call:   [x] Refill   [] Prior Auth  [] Other:     Office:   [x] PCP/Provider -   [] Specialty/Provider -     XANAX  0.25 MG    CRESTOR  40 MG    [id number] - [place] - [address] [facility]  [address] [facility] [address]  Phone: [phone]  Fax: [phone]  FABIANO #: [id number]     Does the patient have enough for 3 days?   [x] Yes   [] No - Send as HP to POD

## 2024-03-11 RX ORDER — ALPRAZOLAM 0.25 MG/1
0.25 TABLET ORAL
Qty: 30 TABLET | Refills: 0 | Status: SHIPPED | OUTPATIENT
Start: 2024-03-11

## 2024-04-09 ENCOUNTER — TELEPHONE (OUTPATIENT)
Dept: FAMILY MEDICINE CLINIC | Facility: CLINIC | Age: 29
End: 2024-04-09

## 2024-04-09 NOTE — TELEPHONE ENCOUNTER
LVM for pt to call back to reschedule his 5-13-24 / 2:00 PM appt due to Dr. Campuzano not being in the office.

## 2024-04-24 ENCOUNTER — OFFICE VISIT (OUTPATIENT)
Dept: FAMILY MEDICINE CLINIC | Facility: CLINIC | Age: 29
End: 2024-04-24
Payer: COMMERCIAL

## 2024-04-24 ENCOUNTER — APPOINTMENT (OUTPATIENT)
Dept: LAB | Facility: MEDICAL CENTER | Age: 29
End: 2024-04-24
Payer: COMMERCIAL

## 2024-04-24 VITALS
WEIGHT: 234.4 LBS | BODY MASS INDEX: 33.56 KG/M2 | SYSTOLIC BLOOD PRESSURE: 130 MMHG | TEMPERATURE: 98.6 F | HEART RATE: 71 BPM | DIASTOLIC BLOOD PRESSURE: 84 MMHG | HEIGHT: 70 IN | OXYGEN SATURATION: 97 %

## 2024-04-24 DIAGNOSIS — Z13.89 SCREENING FOR HEMATURIA OR PROTEINURIA: ICD-10-CM

## 2024-04-24 DIAGNOSIS — M54.2 CERVICAL PAIN: ICD-10-CM

## 2024-04-24 DIAGNOSIS — R20.0 NUMBNESS: ICD-10-CM

## 2024-04-24 DIAGNOSIS — M54.50 LUMBAR PAIN: ICD-10-CM

## 2024-04-24 DIAGNOSIS — Z13.29 SCREENING FOR HYPOTHYROIDISM: ICD-10-CM

## 2024-04-24 DIAGNOSIS — Z13.1 SCREENING FOR DIABETES MELLITUS: ICD-10-CM

## 2024-04-24 DIAGNOSIS — F41.9 ANXIETY: ICD-10-CM

## 2024-04-24 DIAGNOSIS — Z00.00 ANNUAL PHYSICAL EXAM: Primary | ICD-10-CM

## 2024-04-24 DIAGNOSIS — E55.9 VITAMIN D DEFICIENCY: ICD-10-CM

## 2024-04-24 DIAGNOSIS — Z13.0 SCREENING FOR DEFICIENCY ANEMIA: ICD-10-CM

## 2024-04-24 DIAGNOSIS — E78.2 MIXED HYPERLIPIDEMIA: ICD-10-CM

## 2024-04-24 DIAGNOSIS — Z11.4 SCREENING FOR HIV (HUMAN IMMUNODEFICIENCY VIRUS): ICD-10-CM

## 2024-04-24 DIAGNOSIS — F51.01 PRIMARY INSOMNIA: ICD-10-CM

## 2024-04-24 LAB
25(OH)D3 SERPL-MCNC: 10.7 NG/ML (ref 30–100)
BACTERIA UR QL AUTO: ABNORMAL /HPF
BASOPHILS # BLD AUTO: 0.03 THOUSANDS/ÂΜL (ref 0–0.1)
BASOPHILS NFR BLD AUTO: 1 % (ref 0–1)
BILIRUB UR QL STRIP: NEGATIVE
CLARITY UR: CLEAR
COLOR UR: ABNORMAL
EOSINOPHIL # BLD AUTO: 0.08 THOUSAND/ÂΜL (ref 0–0.61)
EOSINOPHIL NFR BLD AUTO: 2 % (ref 0–6)
ERYTHROCYTE [DISTWIDTH] IN BLOOD BY AUTOMATED COUNT: 11.9 % (ref 11.6–15.1)
EST. AVERAGE GLUCOSE BLD GHB EST-MCNC: 94 MG/DL
GLUCOSE UR STRIP-MCNC: NEGATIVE MG/DL
HBA1C MFR BLD: 4.9 %
HCT VFR BLD AUTO: 47.8 % (ref 36.5–49.3)
HGB BLD-MCNC: 16.1 G/DL (ref 12–17)
HGB UR QL STRIP.AUTO: NEGATIVE
IMM GRANULOCYTES # BLD AUTO: 0.01 THOUSAND/UL (ref 0–0.2)
IMM GRANULOCYTES NFR BLD AUTO: 0 % (ref 0–2)
KETONES UR STRIP-MCNC: NEGATIVE MG/DL
LEUKOCYTE ESTERASE UR QL STRIP: NEGATIVE
LYMPHOCYTES # BLD AUTO: 2.38 THOUSANDS/ÂΜL (ref 0.6–4.47)
LYMPHOCYTES NFR BLD AUTO: 44 % (ref 14–44)
MCH RBC QN AUTO: 31.2 PG (ref 26.8–34.3)
MCHC RBC AUTO-ENTMCNC: 33.7 G/DL (ref 31.4–37.4)
MCV RBC AUTO: 93 FL (ref 82–98)
MONOCYTES # BLD AUTO: 0.46 THOUSAND/ÂΜL (ref 0.17–1.22)
MONOCYTES NFR BLD AUTO: 9 % (ref 4–12)
MUCOUS THREADS UR QL AUTO: ABNORMAL
NEUTROPHILS # BLD AUTO: 2.44 THOUSANDS/ÂΜL (ref 1.85–7.62)
NEUTS SEG NFR BLD AUTO: 44 % (ref 43–75)
NITRITE UR QL STRIP: NEGATIVE
NON-SQ EPI CELLS URNS QL MICRO: ABNORMAL /HPF
NRBC BLD AUTO-RTO: 0 /100 WBCS
PH UR STRIP.AUTO: 7 [PH]
PLATELET # BLD AUTO: 274 THOUSANDS/UL (ref 149–390)
PMV BLD AUTO: 10.7 FL (ref 8.9–12.7)
PROT UR STRIP-MCNC: ABNORMAL MG/DL
RBC # BLD AUTO: 5.16 MILLION/UL (ref 3.88–5.62)
RBC #/AREA URNS AUTO: ABNORMAL /HPF
SP GR UR STRIP.AUTO: 1.03 (ref 1–1.03)
TSH SERPL DL<=0.05 MIU/L-ACNC: 1.53 UIU/ML (ref 0.45–4.5)
UROBILINOGEN UR STRIP-ACNC: <2 MG/DL
WBC # BLD AUTO: 5.4 THOUSAND/UL (ref 4.31–10.16)
WBC #/AREA URNS AUTO: ABNORMAL /HPF

## 2024-04-24 PROCEDURE — 83036 HEMOGLOBIN GLYCOSYLATED A1C: CPT

## 2024-04-24 PROCEDURE — 99214 OFFICE O/P EST MOD 30 MIN: CPT | Performed by: INTERNAL MEDICINE

## 2024-04-24 PROCEDURE — 80061 LIPID PANEL: CPT

## 2024-04-24 PROCEDURE — 84443 ASSAY THYROID STIM HORMONE: CPT

## 2024-04-24 PROCEDURE — 80053 COMPREHEN METABOLIC PANEL: CPT

## 2024-04-24 PROCEDURE — 99395 PREV VISIT EST AGE 18-39: CPT | Performed by: INTERNAL MEDICINE

## 2024-04-24 PROCEDURE — 81001 URINALYSIS AUTO W/SCOPE: CPT

## 2024-04-24 PROCEDURE — 36415 COLL VENOUS BLD VENIPUNCTURE: CPT

## 2024-04-24 PROCEDURE — 83735 ASSAY OF MAGNESIUM: CPT

## 2024-04-24 PROCEDURE — 85025 COMPLETE CBC W/AUTO DIFF WBC: CPT

## 2024-04-24 PROCEDURE — 82306 VITAMIN D 25 HYDROXY: CPT

## 2024-04-24 PROCEDURE — 87389 HIV-1 AG W/HIV-1&-2 AB AG IA: CPT

## 2024-04-25 PROBLEM — J40 BRONCHITIS: Status: RESOLVED | Noted: 2022-12-06 | Resolved: 2024-04-25

## 2024-04-25 PROBLEM — M54.2 CERVICAL PAIN: Status: ACTIVE | Noted: 2024-04-25

## 2024-04-25 PROBLEM — R20.0 NUMBNESS: Status: ACTIVE | Noted: 2024-04-25

## 2024-04-25 LAB
ALBUMIN SERPL BCP-MCNC: 4.8 G/DL (ref 3.5–5)
ALP SERPL-CCNC: 69 U/L (ref 34–104)
ALT SERPL W P-5'-P-CCNC: 53 U/L (ref 7–52)
ANION GAP SERPL CALCULATED.3IONS-SCNC: 6 MMOL/L (ref 4–13)
AST SERPL W P-5'-P-CCNC: 28 U/L (ref 13–39)
BILIRUB SERPL-MCNC: 0.89 MG/DL (ref 0.2–1)
BUN SERPL-MCNC: 13 MG/DL (ref 5–25)
CALCIUM SERPL-MCNC: 9.6 MG/DL (ref 8.4–10.2)
CHLORIDE SERPL-SCNC: 105 MMOL/L (ref 96–108)
CHOLEST SERPL-MCNC: 138 MG/DL
CO2 SERPL-SCNC: 29 MMOL/L (ref 21–32)
CREAT SERPL-MCNC: 0.76 MG/DL (ref 0.6–1.3)
GFR SERPL CREATININE-BSD FRML MDRD: 124 ML/MIN/1.73SQ M
GLUCOSE SERPL-MCNC: 89 MG/DL (ref 65–140)
HDLC SERPL-MCNC: 41 MG/DL
HIV 1+2 AB+HIV1 P24 AG SERPL QL IA: NORMAL
HIV 2 AB SERPL QL IA: NORMAL
HIV1 AB SERPL QL IA: NORMAL
HIV1 P24 AG SERPL QL IA: NORMAL
LDLC SERPL CALC-MCNC: 58 MG/DL (ref 0–100)
MAGNESIUM SERPL-MCNC: 2.3 MG/DL (ref 1.9–2.7)
NONHDLC SERPL-MCNC: 97 MG/DL
POTASSIUM SERPL-SCNC: 4.5 MMOL/L (ref 3.5–5.3)
PROT SERPL-MCNC: 7.2 G/DL (ref 6.4–8.4)
SODIUM SERPL-SCNC: 140 MMOL/L (ref 135–147)
TRIGL SERPL-MCNC: 195 MG/DL

## 2024-04-25 NOTE — ASSESSMENT & PLAN NOTE
Can be due to lumbar radiculopathy, referral to chiropractic care placed.  Continue with as needed Tylenol.  Will check blood work including electrolytes and thyroid function.

## 2024-04-25 NOTE — PROGRESS NOTES
Assessment/Plan:    Anxiety  Well-controlled on current meds.  Continue the same.    Cervical pain  Refer to chiropractic care.    Lumbar pain  Refer to chiropractic care.    Numbness  Can be due to lumbar radiculopathy, referral to chiropractic care placed.  Continue with as needed Tylenol.  Will check blood work including electrolytes and thyroid function.    Primary insomnia  Doing okay on trazodone 200 mg.       Diagnoses and all orders for this visit:    Annual physical exam    Numbness  -     Magnesium; Future  -     Ambulatory Referral to Chiropractic; Future    Cervical pain  -     Ambulatory Referral to Chiropractic; Future    Lumbar pain  -     Ambulatory Referral to Chiropractic; Future    Anxiety    Primary insomnia          Subjective:      Patient ID: Bill Conway is a 29 y.o. male.    Patient came today for annual checkup and to follow-up on his chronic problems and with a new complaint of numbness of his lower extremities that started about 5 days ago.  His vital signs are very acceptable except of elevated BMI.  He is up-to-date on his tetanus shot.  He does not smoke, does not drink alcohol.  He is trying to eat healthy, working on being more physically active.          Headache  Dizziness  Associated symptoms include headaches and numbness. Pertinent negatives include no chest pain, chills, coughing or fever.   Back Pain  Associated symptoms include headaches and numbness. Pertinent negatives include no chest pain or fever.       The following portions of the patient's history were reviewed and updated as appropriate: allergies, current medications, past family history, past medical history, past social history, past surgical history, and problem list.    Review of Systems   Constitutional:  Negative for chills and fever.   Respiratory:  Negative for cough, shortness of breath and wheezing.    Cardiovascular:  Negative for chest pain, palpitations and leg swelling.   Musculoskeletal:  Positive for  "back pain.   Neurological:  Positive for dizziness, numbness and headaches.   Psychiatric/Behavioral:  Negative for confusion.          Objective:      /84 (BP Location: Left arm, Patient Position: Sitting, Cuff Size: Large)   Pulse 71   Temp 98.6 °F (37 °C) (Tympanic)   Ht 5' 10\" (1.778 m)   Wt 106 kg (234 lb 6.4 oz)   SpO2 97%   BMI 33.63 kg/m²     Allergies   Allergen Reactions    Keflex [Cephalexin] Hives    Penicillins Hives          Current Outpatient Medications:     albuterol (PROVENTIL HFA,VENTOLIN HFA) 90 mcg/act inhaler, INHALE 2 PUFFS BY MOUTH EVERY 6 HOURS AS NEEDED FOR WHEEZING, Disp: 25.5 g, Rfl: 0    ALPRAZolam (XANAX) 0.25 mg tablet, Take 1 tablet (0.25 mg total) by mouth daily at bedtime as needed for anxiety, Disp: 30 tablet, Rfl: 0    benzonatate (TESSALON) 200 MG capsule, Take 1 capsule (200 mg total) by mouth 3 (three) times a day as needed for cough, Disp: 30 capsule, Rfl: 0    loperamide (IMODIUM) 2 mg capsule, Take 1 capsule (2 mg total) by mouth 4 (four) times a day as needed for diarrhea, Disp: 90 capsule, Rfl: 0    meclizine (ANTIVERT) 25 mg tablet, Take 1 tablet (25 mg total) by mouth every 8 (eight) hours as needed for dizziness, Disp: 30 tablet, Rfl: 0    multivitamin (THERAGRAN) TABS, Take 1 tablet by mouth daily, Disp: , Rfl:     omeprazole (PriLOSEC) 20 mg delayed release capsule, Take 1 capsule (20 mg total) by mouth daily, Disp: 90 capsule, Rfl: 1    rosuvastatin (CRESTOR) 40 MG tablet, Take 1 tablet (40 mg total) by mouth daily, Disp: 90 tablet, Rfl: 0    traZODone (DESYREL) 100 mg tablet, TAKE 2 TABLETS(200 MG) BY MOUTH DAILY AT BEDTIME, Disp: 180 tablet, Rfl: 1    cholecalciferol (VITAMIN D3) 1,000 units tablet, Take 1 tablet (1,000 Units total) by mouth daily Start after done with the high-dose., Disp: 90 tablet, Rfl: 3     There are no Patient Instructions on file for this visit.        Physical Exam  Constitutional:       General: He is not in acute distress.     " Appearance: He is not ill-appearing or toxic-appearing.   Cardiovascular:      Rate and Rhythm: Normal rate.      Heart sounds: No murmur heard.     No gallop.   Pulmonary:      Effort: No respiratory distress.      Breath sounds: No wheezing or rales.   Neurological:      Cranial Nerves: No cranial nerve deficit.      Sensory: No sensory deficit.      Motor: No weakness.      Coordination: Coordination normal.      Gait: Gait normal.   Psychiatric:         Mood and Affect: Mood normal.

## 2024-04-26 ENCOUNTER — TELEPHONE (OUTPATIENT)
Age: 29
End: 2024-04-26

## 2024-04-26 NOTE — TELEPHONE ENCOUNTER
Patient called and needs the [place] ppwk adjusted to read he can be out up to 12 days a month.  Have the PCP adjust and re fax.  Please advised patient when completed. [phone]

## 2024-04-29 ENCOUNTER — TELEPHONE (OUTPATIENT)
Age: 29
End: 2024-04-29

## 2024-04-29 NOTE — TELEPHONE ENCOUNTER
Patient called asking for [facility] paperwork be backdated to 4/24 for 1 year. Please call and advise [phone]

## 2024-04-29 NOTE — TELEPHONE ENCOUNTER
4/29 10:51am: MEME for PT to call his HR to have them fax over a new FMLA form for PCP to complete, per PCP request.

## 2024-04-30 NOTE — TELEPHONE ENCOUNTER
Patient called in to check if the practice has received his FMLA paper work faxed over yday. Kindly check on the same and confirm receipt to the patient by an return call. Thanks

## 2024-05-01 NOTE — TELEPHONE ENCOUNTER
Received fax through [employer] from [employer].  Forms were given to Dr. Campuzano.  As soon as he has them completed, they will be faxed back to [id number]. LVM for pt letting him know that the fax has been received.

## 2024-05-08 DIAGNOSIS — K21.00 GASTROESOPHAGEAL REFLUX DISEASE WITH ESOPHAGITIS, UNSPECIFIED WHETHER HEMORRHAGE: ICD-10-CM

## 2024-05-08 DIAGNOSIS — F41.9 ANXIETY: ICD-10-CM

## 2024-05-08 RX ORDER — ALPRAZOLAM 0.25 MG/1
0.25 TABLET ORAL
Qty: 30 TABLET | Refills: 0 | Status: SHIPPED | OUTPATIENT
Start: 2024-05-08

## 2024-05-08 RX ORDER — OMEPRAZOLE 20 MG/1
20 CAPSULE, DELAYED RELEASE ORAL DAILY
Qty: 90 CAPSULE | Refills: 1 | Status: SHIPPED | OUTPATIENT
Start: 2024-05-08

## 2024-05-08 NOTE — TELEPHONE ENCOUNTER
Medication: xanax     Dose/Frequency: 0.25 mg prn at bedtime    Quantity: 30    Pharm[facility]: [facility]    Office:   [x] PCP/Provider -   [] Speciality/Provider -     Does the patient have enough for 3 days?   [] Yes   [x] No - Send as HP to POD

## 2024-05-08 NOTE — TELEPHONE ENCOUNTER
Medication:   omeprazole (PriLOSEC)     Dose/Frequency: 20 mg     Quantity: 90/1 refill     Pharmacy: [id number] - [place] - [address] [facility] [phone]     Office:   [x] PCP/Provider -   [] Speciality/Provider -     Does the patient have enough for 3 days?   [] Yes   [x] No - Send as HP to POD

## 2024-05-09 ENCOUNTER — OFFICE VISIT (OUTPATIENT)
Dept: FAMILY MEDICINE CLINIC | Facility: CLINIC | Age: 29
End: 2024-05-09
Payer: COMMERCIAL

## 2024-05-09 VITALS
BODY MASS INDEX: 32.93 KG/M2 | RESPIRATION RATE: 20 BRPM | HEIGHT: 70 IN | HEART RATE: 96 BPM | WEIGHT: 230 LBS | TEMPERATURE: 97.8 F | SYSTOLIC BLOOD PRESSURE: 132 MMHG | DIASTOLIC BLOOD PRESSURE: 78 MMHG | OXYGEN SATURATION: 97 %

## 2024-05-09 DIAGNOSIS — J01.11 ACUTE RECURRENT FRONTAL SINUSITIS: Primary | ICD-10-CM

## 2024-05-09 PROCEDURE — 99214 OFFICE O/P EST MOD 30 MIN: CPT | Performed by: FAMILY MEDICINE

## 2024-05-09 RX ORDER — DOXYCYCLINE HYCLATE 100 MG/1
100 CAPSULE ORAL EVERY 12 HOURS SCHEDULED
Qty: 14 CAPSULE | Refills: 0 | Status: SHIPPED | OUTPATIENT
Start: 2024-05-09 | End: 2024-05-16

## 2024-05-09 NOTE — PROGRESS NOTES
FAMILY PRACTICE OFFICE VISIT    NAME: Bill Conway    AGE: 29 y.o.   SEX: male  : 1995   MRN: [id number]    DATE: 2024  TIME: 11:43 AM    Assessment and Plan   1. Acute recurrent frontal sinusitis  Rx given for antibiotic -   And if symptoms persist or worsen within the next couple of days - begin antibiotic.  Recommendation to increase fluids - particularly water, rest, saline nasal spray and humidified air  Recommend flonase over the counter prn    Patient Instructions   Followup with dr MEJIA - to discuss possible CT sinuses and formal allergy testing    Flonase if needed    Antibiotic  Avoid taking vitamins or supplements within 2 hours of taking antibiotc    Recommendation to increase fluids - particularly water, rest, saline nasal spray and humidified air    Yogurt while on antibiotic.        Chief Complaint     Chief Complaint   Patient presents with    Nausea     Sick running nose. Cough and headaches, fatigue started a couple of days ago       History of Present Illness   Bill Conway is a 29 y.o.-year-old male who presents with a few days of not feeling well.    No known sick exposure.    Has known allergies and takes an otc allergy med.    No recent use of MDI.    Has tried using mucinex cold and flu          Review of Systems   Review of Systems   Constitutional:  Positive for fatigue. Negative for fever.   HENT:  Positive for congestion, rhinorrhea, sinus pressure, sinus pain and sore throat.    Respiratory:  Positive for cough. Negative for shortness of breath and wheezing.    Cardiovascular:  Negative for chest pain and palpitations.   Gastrointestinal:  Positive for vomiting. Negative for abdominal pain.        Vomiting a couple of episodes X 2 days ago - resolved.  No diarrhea but loose stools    Appetite decresaed       Genitourinary:  Negative for decreased urine volume.        Drinking fluids.     Allergic/Immunologic: Positive for environmental allergies.   Neurological:  Positive for  headaches.       Active Problem List     Patient Active Problem List   Diagnosis    Gastroesophageal reflux disease with esophagitis without hemorrhage    Oden's esophagus without dysplasia    Elevated blood pressure reading    Anxiety    Allergic rhinitis    Lumbar pain    Chronic diarrhea    Vitamin D deficiency    Mixed hyperlipidemia    Loose stools    Primary insomnia    Abdominal cramps    Annual physical exam    Nonintractable headache    Dizziness    Pain, dental    Cervical pain    Numbness         Past Medical History:  Past Medical History:   Diagnosis Date    Anxiety     Fatty liver     GERD (gastroesophageal reflux disease)     Hyperlipidemia     Hypertension        Past Surgical History:  Past Surgical History:   Procedure Laterality Date    TONSILLECTOMY         Family History:  Family History   Problem Relation Age of Onset    Cancer Mother     Diabetes Mother     Hypertension Mother     Anxiety disorder Mother     Heart attack Father     Hypertension Father     Anxiety disorder Father     Cancer Maternal Aunt        Social History:  Social History     Socioeconomic History    Marital status: Single     Spouse name: Not on file    Number of children: Not on file    Years of education: Not on file    Highest education level: Not on file   Occupational History    Not on file   Tobacco Use    Smoking status: Never    Smokeless tobacco: Never   Vaping Use    Vaping status: Never Used   Substance and Sexual Activity    Alcohol use: Not Currently     Comment: holiday/special occassion    Drug use: Never    Sexual activity: Yes     Partners: Female   Other Topics Concern    Not on file   Social History Narrative    Not on file     Social Determinants of Health     Financial Resource Strain: Not on file   Food Insecurity: Not on file   Transportation Needs: Not on file   Physical Activity: Not on file   Stress: Not on file   Social Connections: Not on file   Intimate Partner Violence: Not on file    Housing Stability: Not on file       Objective     Vitals:    05/09/24 1136   BP: 132/78   Pulse: 102   Resp: 20   Temp: 97.8 °F (36.6 °C)   SpO2: 97%     Wt Readings from Last 3 Encounters:   05/09/24 104 kg (230 lb)   04/24/24 106 kg (234 lb 6.4 oz)   02/12/24 106 kg (234 lb)       Physical Exam  Vitals and nursing note reviewed.   Constitutional:       General: He is not in acute distress.     Appearance: Normal appearance.      Comments: Appears tired, but nontoxic.  Sounds congested nasally     HENT:      Right Ear: Tympanic membrane normal.      Left Ear: Tympanic membrane normal.      Nose: Congestion present.      Mouth/Throat:      Mouth: Mucous membranes are moist.      Pharynx: No oropharyngeal exudate or posterior oropharyngeal erythema.      Comments: Sinus pressure to palpation frontal/maxillary  Eyes:      General:         Right eye: No discharge.         Left eye: No discharge.      Extraocular Movements: Extraocular movements intact.      Pupils: Pupils are equal, round, and reactive to light.   Cardiovascular:      Rate and Rhythm: Normal rate and regular rhythm.      Heart sounds: Normal heart sounds. No murmur heard.  Pulmonary:      Effort: Pulmonary effort is normal. No respiratory distress.      Breath sounds: Normal breath sounds. No wheezing, rhonchi or rales.      Comments: No use of accessory respiratory muscles    Not coughing during encounter    Musculoskeletal:      Cervical back: Neck supple. No tenderness.   Lymphadenopathy:      Cervical: No cervical adenopathy.   Skin:     General: Skin is warm.      Coloration: Skin is not jaundiced.   Neurological:      Mental Status: He is alert.   Psychiatric:         Mood and Affect: Mood normal.         Behavior: Behavior normal.         Thought Content: Thought content normal.         Judgment: Judgment normal.         Pertinent Laboratory/Diagnostic Studies:  Lab Results   Component Value Date    BUN 13 04/24/2024    CREATININE 0.76  "04/24/2024    CALCIUM 9.6 04/24/2024    K 4.5 04/24/2024    CO2 29 04/24/2024     04/24/2024     Lab Results   Component Value Date    ALT 53 (H) 04/24/2024    AST 28 04/24/2024    ALKPHOS 69 04/24/2024       Lab Results   Component Value Date    WBC 5.40 04/24/2024    HGB 16.1 04/24/2024    HCT 47.8 04/24/2024    MCV 93 04/24/2024     04/24/2024       No results found for: \"TSH\"    No results found for: \"CHOL\"  Lab Results   Component Value Date    TRIG 195 (H) 04/24/2024     Lab Results   Component Value Date    HDL 41 04/24/2024     Lab Results   Component Value Date    LDLCALC 58 04/24/2024     Lab Results   Component Value Date    HGBA1C 4.9 04/24/2024       Results for orders placed or performed in visit on 04/24/24   Comprehensive metabolic panel   Result Value Ref Range    Sodium 140 135 - 147 mmol/L    Potassium 4.5 3.5 - 5.3 mmol/L    Chloride 105 96 - 108 mmol/L    CO2 29 21 - 32 mmol/L    ANION GAP 6 4 - 13 mmol/L    BUN 13 5 - 25 mg/dL    Creatinine 0.76 0.60 - 1.30 mg/dL    Glucose 89 65 - 140 mg/dL    Calcium 9.6 8.4 - 10.2 mg/dL    AST 28 13 - 39 U/L    ALT 53 (H) 7 - 52 U/L    Alkaline Phosphatase 69 34 - 104 U/L    Total Protein 7.2 6.4 - 8.4 g/dL    Albumin 4.8 3.5 - 5.0 g/dL    Total Bilirubin 0.89 0.20 - 1.00 mg/dL    eGFR 124 ml/min/1.73sq m   TSH, 3rd generation with Free T4 reflex   Result Value Ref Range    TSH 3RD GENERATON 1.531 0.450 - 4.500 uIU/mL   CBC and differential   Result Value Ref Range    WBC 5.40 4.31 - 10.16 Thousand/uL    RBC 5.16 3.88 - 5.62 Million/uL    Hemoglobin 16.1 12.0 - 17.0 g/dL    Hematocrit 47.8 36.5 - 49.3 %    MCV 93 82 - 98 fL    MCH 31.2 26.8 - 34.3 pg    MCHC 33.7 31.4 - 37.4 g/dL    RDW 11.9 11.6 - 15.1 %    MPV 10.7 8.9 - 12.7 fL    Platelets 274 149 - 390 Thousands/uL    nRBC 0 /100 WBCs    Segmented % 44 43 - 75 %    Immature Grans % 0 0 - 2 %    Lymphocytes % 44 14 - 44 %    Monocytes % 9 4 - 12 %    Eosinophils Relative 2 0 - 6 %    " Basophils Relative 1 0 - 1 %    Absolute Neutrophils 2.44 1.85 - 7.62 Thousands/µL    Absolute Immature Grans 0.01 0.00 - 0.20 Thousand/uL    Absolute Lymphocytes 2.38 0.60 - 4.47 Thousands/µL    Absolute Monocytes 0.46 0.17 - 1.22 Thousand/µL    Eosinophils Absolute 0.08 0.00 - 0.61 Thousand/µL    Basophils Absolute 0.03 0.00 - 0.10 Thousands/µL   Lipid panel   Result Value Ref Range    Cholesterol 138 See Comment mg/dL    Triglycerides 195 (H) See Comment mg/dL    HDL, Direct 41 >=40 mg/dL    LDL Calculated 58 0 - 100 mg/dL    Non-HDL-Chol (CHOL-HDL) 97 mg/dl   Vitamin D 25 hydroxy   Result Value Ref Range    Vit D, 25-Hydroxy 10.7 (L) 30.0 - 100.0 ng/mL   UA (URINE) with reflex to Scope   Result Value Ref Range    Color, UA Light Yellow     Clarity, UA Clear     Specific Gravity, UA 1.026 1.003 - 1.030    pH, UA 7.0 4.5, 5.0, 5.5, 6.0, 6.5, 7.0, 7.5, 8.0    Leukocytes, UA Negative Negative    Nitrite, UA Negative Negative    Protein, UA Trace (A) Negative mg/dl    Glucose, UA Negative Negative mg/dl    Ketones, UA Negative Negative mg/dl    Urobilinogen, UA <2.0 <2.0 mg/dl mg/dl    Bilirubin, UA Negative Negative    Occult Blood, UA Negative Negative   Hemoglobin A1C   Result Value Ref Range    Hemoglobin A1C 4.9 Normal 4.0-5.6%; PreDiabetic 5.7-6.4%; Diabetic >=6.5%; Glycemic control for adults with diabetes <7.0% %    EAG 94 mg/dl   HIV 1/2 AG/AB w Reflex [facility] for 2 yr old and above   Result Value Ref Range    HIV-1 p24 Antigen Non-Reactive Non-Reactive    HIV-1 Antibody Non-Reactive Non-Reactive    HIV-2 Antibody Non-Reactive Non-Reactive    HIV Ag-Ab 5th Gen Non-Reactive Non-Reactive   Magnesium   Result Value Ref Range    Magnesium 2.3 1.9 - 2.7 mg/dL   Urine Microscopic   Result Value Ref Range    RBC, UA None Seen None Seen, 1-2 /hpf    WBC, UA None Seen None Seen, 1-2 /hpf    Epithelial Cells None Seen None Seen, Occasional /hpf    Bacteria, UA None Seen None Seen, Occasional /hpf    MUCUS THREADS  Occasional (A) None Seen       No orders of the defined types were placed in this encounter.      ALLERGIES:  Allergies   Allergen Reactions    Keflex [Cephalexin] Hives    Penicillins Hives       Current Medications     Current Outpatient Medications   Medication Sig Dispense Refill    albuterol (PROVENTIL HFA,VENTOLIN HFA) 90 mcg/act inhaler INHALE 2 PUFFS BY MOUTH EVERY 6 HOURS AS NEEDED FOR WHEEZING 25.5 g 0    ALPRAZolam (XANAX) 0.25 mg tablet Take 1 tablet (0.25 mg total) by mouth daily at bedtime as needed for anxiety 30 tablet 0    benzonatate (TESSALON) 200 MG capsule Take 1 capsule (200 mg total) by mouth 3 (three) times a day as needed for cough 30 capsule 0    cholecalciferol (VITAMIN D3) 1,000 units tablet Take 1 tablet (1,000 Units total) by mouth daily Start after done with the high-dose. 90 tablet 3    loperamide (IMODIUM) 2 mg capsule Take 1 capsule (2 mg total) by mouth 4 (four) times a day as needed for diarrhea 90 capsule 0    meclizine (ANTIVERT) 25 mg tablet Take 1 tablet (25 mg total) by mouth every 8 (eight) hours as needed for dizziness 30 tablet 0    multivitamin (THERAGRAN) TABS Take 1 tablet by mouth daily      omeprazole (PriLOSEC) 20 mg delayed release capsule Take 1 capsule (20 mg total) by mouth daily 90 capsule 1    rosuvastatin (CRESTOR) 40 MG tablet Take 1 tablet (40 mg total) by mouth daily 90 tablet 0    traZODone (DESYREL) 100 mg tablet TAKE 2 TABLETS(200 MG) BY MOUTH DAILY AT BEDTIME 180 tablet 1     No current facility-administered medications for this visit.         Health Maintenance     Health Maintenance   Topic Date Due    COVID-19 Vaccine (1 - 2023-24 season) Never done    Influenza Vaccine (Season Ended) 09/01/2024    Annual Physical  04/24/2025    Depression Screening  05/09/2025    DTaP,Tdap,and Td Vaccines (2 - Td or Tdap) 05/28/2031    Zoster Vaccine (1 of 2) 02/28/2046    HIV Screening  Completed    Hepatitis C Screening  Completed    Pneumococcal Vaccine:  Pediatrics (0 to 5 Years) and At-Risk Patients (6 to 64 Years)  Aged Out    HIB Vaccine  Aged Out    IPV Vaccine  Aged Out    Hepatitis A Vaccine  Aged Out    Meningococcal ACWY Vaccine  Aged Out    HPV Vaccine  Aged Out     Immunization History   Administered Date(s) Administered    Tdap 05/28/2021          Prachi Bravo DO

## 2024-05-17 DIAGNOSIS — F51.01 PRIMARY INSOMNIA: ICD-10-CM

## 2024-05-17 DIAGNOSIS — E78.1 HYPERTRIGLYCERIDEMIA: ICD-10-CM

## 2024-05-17 RX ORDER — ROSUVASTATIN CALCIUM 40 MG/1
40 TABLET, COATED ORAL DAILY
Qty: 90 TABLET | Refills: 0 | Status: SHIPPED | OUTPATIENT
Start: 2024-05-17

## 2024-05-17 RX ORDER — TRAZODONE HYDROCHLORIDE 100 MG/1
200 TABLET ORAL
Qty: 180 TABLET | Refills: 1 | Status: SHIPPED | OUTPATIENT
Start: 2024-05-17

## 2024-05-28 ENCOUNTER — TELEPHONE (OUTPATIENT)
Age: 29
End: 2024-05-28

## 2024-06-03 ENCOUNTER — TELEMEDICINE (OUTPATIENT)
Dept: FAMILY MEDICINE CLINIC | Facility: CLINIC | Age: 29
End: 2024-06-03

## 2024-06-03 DIAGNOSIS — K52.9 CHRONIC DIARRHEA: Primary | ICD-10-CM

## 2024-06-03 PROCEDURE — 99213 OFFICE O/P EST LOW 20 MIN: CPT | Performed by: INTERNAL MEDICINE

## 2024-06-03 NOTE — PROGRESS NOTES
Virtual Regular Visit    Verification of patient location:    Patient is located at Home in the following state in which I hold an active license [place]      Assessment/Plan:    Problem List Items Addressed This Visit       Chronic diarrhea - Primary            Reason for visit is   Chief Complaint   Patient presents with    Virtual Regular Visit        Encounter provider Joshua Campuzano MD      Recent Visits  No visits were found meeting these conditions.  Showing recent visits within past 7 days and meeting all other requirements  Today's Visits  Date Type Provider Dept   06/03/24 Telemedicine Joshua Campuzano MD [facility]   Showing today's visits and meeting all other requirements  Future Appointments  No visits were found meeting these conditions.  Showing future appointments within next 150 days and meeting all other requirements       The patient was identified by name and date of birth. Bill Conway was informed that this is a telemedicine visit and that the visit is being conducted through the Epic Embedded platform. He agrees to proceed..  My office door was closed. No one else was in the room.  He acknowledged consent and understanding of privacy and security of the video platform. The patient has agreed to participate and understands they can discontinue the visit at any time.    Patient is aware this is a billable service.     Subjective  Bill Conway is a 29 y.o. male .      Patient called today with ongoing complaints of frequent episodes of abdominal discomfort and diarrhea.  He would like his forms to be updated, he needs more time off during his exacerbations.  Forms were updated and faxed.         Past Medical History:   Diagnosis Date    Anxiety     Fatty liver     GERD (gastroesophageal reflux disease)     Hyperlipidemia     Hypertension        Past Surgical History:   Procedure Laterality Date    TONSILLECTOMY         Current Outpatient Medications    Medication Sig Dispense Refill    albuterol (PROVENTIL HFA,VENTOLIN HFA) 90 mcg/act inhaler INHALE 2 PUFFS BY MOUTH EVERY 6 HOURS AS NEEDED FOR WHEEZING 25.5 g 0    ALPRAZolam (XANAX) 0.25 mg tablet Take 1 tablet (0.25 mg total) by mouth daily at bedtime as needed for anxiety 30 tablet 0    benzonatate (TESSALON) 200 MG capsule Take 1 capsule (200 mg total) by mouth 3 (three) times a day as needed for cough 30 capsule 0    loperamide (IMODIUM) 2 mg capsule Take 1 capsule (2 mg total) by mouth 4 (four) times a day as needed for diarrhea 90 capsule 0    meclizine (ANTIVERT) 25 mg tablet Take 1 tablet (25 mg total) by mouth every 8 (eight) hours as needed for dizziness 30 tablet 0    multivitamin (THERAGRAN) TABS Take 1 tablet by mouth daily      omeprazole (PriLOSEC) 20 mg delayed release capsule Take 1 capsule (20 mg total) by mouth daily 90 capsule 1    rosuvastatin (CRESTOR) 40 MG tablet Take 1 tablet (40 mg total) by mouth daily 90 tablet 0    traZODone (DESYREL) 100 mg tablet Take 2 tablets (200 mg total) by mouth daily at bedtime 180 tablet 1    cholecalciferol (VITAMIN D3) 1,000 units tablet Take 1 tablet (1,000 Units total) by mouth daily Start after done with the high-dose. 90 tablet 3     No current facility-administered medications for this visit.        Allergies   Allergen Reactions    Keflex [Cephalexin] Hives    Penicillins Hives       Review of Systems   Constitutional:  Negative for chills and fever.   Gastrointestinal:  Positive for diarrhea and nausea. Negative for abdominal distention, anal bleeding and vomiting.       Video Exam    There were no vitals filed for this visit.    Physical Exam  Constitutional:       General: He is not in acute distress.         Visit Time  Total Visit Duration: 20

## 2024-06-04 ENCOUNTER — TELEPHONE (OUTPATIENT)
Dept: FAMILY MEDICINE CLINIC | Facility: CLINIC | Age: 29
End: 2024-06-04

## 2024-06-12 NOTE — TELEPHONE ENCOUNTER
Patient states his FMLA dates are wrong and asking for them to be changed.     June 3rd date was supposed to be may 20th.

## 2024-06-20 ENCOUNTER — TELEPHONE (OUTPATIENT)
Age: 29
End: 2024-06-20

## 2024-06-20 DIAGNOSIS — F41.9 ANXIETY: ICD-10-CM

## 2024-06-20 NOTE — TELEPHONE ENCOUNTER
Patient called because he stated that he missed a call from the office. No documentation regarding a call. Unable to warm transfer as there was no answer at the office. Patient was also asking if the date on his [facility] paperwork had been corrected and was the paperwork faxed to [employer]? Please call the patient back at [phone].

## 2024-06-21 RX ORDER — ALPRAZOLAM 0.25 MG/1
0.25 TABLET ORAL
Qty: 30 TABLET | Refills: 0 | Status: SHIPPED | OUTPATIENT
Start: 2024-06-21

## 2024-06-21 NOTE — TELEPHONE ENCOUNTER
Called pt no answer LM advising to reschedule appt on August 7, 2024 at 3:00 PM w/Dr. Campuzano as he is not going to be in the office that day.

## 2024-06-26 ENCOUNTER — TELEPHONE (OUTPATIENT)
Dept: FAMILY MEDICINE CLINIC | Facility: CLINIC | Age: 29
End: 2024-06-26

## 2024-06-26 NOTE — TELEPHONE ENCOUNTER
Called pt to reschedule his August 5th appointment and while on the phone he asked if the date had been changed on his [facility] paperwork to reflect 5-20-24, not Mmai 3, 2024.  He is requesting that the date be changed and faxed to [facility], and then he would like a copy sent to him via [employer].

## 2024-07-31 ENCOUNTER — OFFICE VISIT (OUTPATIENT)
Dept: FAMILY MEDICINE CLINIC | Facility: CLINIC | Age: 29
End: 2024-07-31
Payer: COMMERCIAL

## 2024-07-31 ENCOUNTER — TELEPHONE (OUTPATIENT)
Dept: FAMILY MEDICINE CLINIC | Facility: CLINIC | Age: 29
End: 2024-07-31

## 2024-07-31 VITALS
HEIGHT: 70 IN | WEIGHT: 237.6 LBS | OXYGEN SATURATION: 97 % | DIASTOLIC BLOOD PRESSURE: 78 MMHG | SYSTOLIC BLOOD PRESSURE: 132 MMHG | RESPIRATION RATE: 20 BRPM | BODY MASS INDEX: 34.01 KG/M2 | TEMPERATURE: 97.5 F | HEART RATE: 79 BPM

## 2024-07-31 DIAGNOSIS — E78.2 MIXED HYPERLIPIDEMIA: ICD-10-CM

## 2024-07-31 DIAGNOSIS — Z00.00 ANNUAL PHYSICAL EXAM: Primary | ICD-10-CM

## 2024-07-31 DIAGNOSIS — F51.01 PRIMARY INSOMNIA: ICD-10-CM

## 2024-07-31 DIAGNOSIS — K52.9 CHRONIC DIARRHEA: ICD-10-CM

## 2024-07-31 DIAGNOSIS — F41.9 ANXIETY: ICD-10-CM

## 2024-07-31 PROCEDURE — 99395 PREV VISIT EST AGE 18-39: CPT | Performed by: INTERNAL MEDICINE

## 2024-07-31 PROCEDURE — 99214 OFFICE O/P EST MOD 30 MIN: CPT | Performed by: INTERNAL MEDICINE

## 2024-07-31 RX ORDER — ALPRAZOLAM 0.25 MG/1
0.25 TABLET ORAL
Qty: 30 TABLET | Refills: 0 | Status: SHIPPED | OUTPATIENT
Start: 2024-07-31

## 2024-07-31 NOTE — TELEPHONE ENCOUNTER
Per Dr. Campuzano I printed out the FMLA paperwork from 6-4-24 from [employer] and faxed it to [phone].    Fax confirmation received 7-31-24 at 3:43 PM.

## 2024-07-31 NOTE — PROGRESS NOTES
"Assessment/Plan:    Chronic diarrhea  He will continue follow-up with gastroenterology.  He brought me FMLA forms for his work to fill out.  He still needs frequent breaks due to this problem which is associated with abdominal cramping.    Anxiety  He uses alprazolam as needed, denies any side effects.    Mixed hyperlipidemia  His lipid panel was significantly improving being on current dose of Crestor.  Continue the same.       Diagnoses and all orders for this visit:    Annual physical exam    Anxiety  -     ALPRAZolam (XANAX) 0.25 mg tablet; Take 1 tablet (0.25 mg total) by mouth daily at bedtime as needed for anxiety    Mixed hyperlipidemia    Primary insomnia    Chronic diarrhea          Subjective:      Patient ID: Bill Conway is a 29 y.o. male.    Patient came today for his annual checkup and to follow-up on his chronic medical problems, he also brought me FMLA forms that were filled out.  His vital signs are acceptable.  He follows up with GI.  He does not smoke, he is trying to eat healthy.  He will plan to exercise regularly.  Up-to-date on his tetanus shot.          The following portions of the patient's history were reviewed and updated as appropriate: allergies, current medications, past family history, past medical history, past social history, past surgical history, and problem list.    Review of Systems   Constitutional:  Negative for chills and fever.   Respiratory:  Negative for cough, shortness of breath and wheezing.    Cardiovascular:  Negative for chest pain, palpitations and leg swelling.   Gastrointestinal:  Positive for diarrhea and nausea. Negative for abdominal distention, anal bleeding and vomiting.         Objective:      /78 (BP Location: Right arm, Patient Position: Sitting, Cuff Size: Large)   Pulse 79   Temp 97.5 °F (36.4 °C) (Tympanic)   Resp 20   Ht 5' 10\" (1.778 m)   Wt 108 kg (237 lb 9.6 oz)   SpO2 97%   BMI 34.09 kg/m²     Allergies   Allergen Reactions    Keflex " [Cephalexin] Hives    Penicillins Hives          Current Outpatient Medications:     albuterol (PROVENTIL HFA,VENTOLIN HFA) 90 mcg/act inhaler, INHALE 2 PUFFS BY MOUTH EVERY 6 HOURS AS NEEDED FOR WHEEZING, Disp: 25.5 g, Rfl: 0    ALPRAZolam (XANAX) 0.25 mg tablet, Take 1 tablet (0.25 mg total) by mouth daily at bedtime as needed for anxiety, Disp: 30 tablet, Rfl: 0    benzonatate (TESSALON) 200 MG capsule, Take 1 capsule (200 mg total) by mouth 3 (three) times a day as needed for cough, Disp: 30 capsule, Rfl: 0    loperamide (IMODIUM) 2 mg capsule, Take 1 capsule (2 mg total) by mouth 4 (four) times a day as needed for diarrhea, Disp: 90 capsule, Rfl: 0    meclizine (ANTIVERT) 25 mg tablet, Take 1 tablet (25 mg total) by mouth every 8 (eight) hours as needed for dizziness, Disp: 30 tablet, Rfl: 0    multivitamin (THERAGRAN) TABS, Take 1 tablet by mouth daily, Disp: , Rfl:     omeprazole (PriLOSEC) 20 mg delayed release capsule, Take 1 capsule (20 mg total) by mouth daily, Disp: 90 capsule, Rfl: 1    rosuvastatin (CRESTOR) 40 MG tablet, Take 1 tablet (40 mg total) by mouth daily, Disp: 90 tablet, Rfl: 0    traZODone (DESYREL) 100 mg tablet, Take 2 tablets (200 mg total) by mouth daily at bedtime, Disp: 180 tablet, Rfl: 1    cholecalciferol (VITAMIN D3) 1,000 units tablet, Take 1 tablet (1,000 Units total) by mouth daily Start after done with the high-dose., Disp: 90 tablet, Rfl: 3     There are no Patient Instructions on file for this visit.        Physical Exam  Constitutional:       General: He is not in acute distress.     Appearance: He is not ill-appearing or toxic-appearing.   Cardiovascular:      Pulses: Normal pulses.      Heart sounds: No murmur heard.     No gallop.   Pulmonary:      Effort: No respiratory distress.      Breath sounds: No wheezing or rales.   Abdominal:      General: There is no distension.      Tenderness: There is no abdominal tenderness. There is no guarding.

## 2024-08-01 ENCOUNTER — TELEPHONE (OUTPATIENT)
Dept: FAMILY MEDICINE CLINIC | Facility: CLINIC | Age: 29
End: 2024-08-01

## 2024-08-01 NOTE — TELEPHONE ENCOUNTER
Received fax through [employer] 7-31-24 / 12:41 PM from [employer].  Pt had an appt 7-31-24 with Dr. Campuzano.  Forms were placed in Dr. Campuzano's folder and given to Eve SHER).  Once forms are completed, they will be faxed back to [employer].

## 2024-08-02 NOTE — ASSESSMENT & PLAN NOTE
He will continue follow-up with gastroenterology.  He brought me FMLA forms for his work to fill out.  He still needs frequent breaks due to this problem which is associated with abdominal cramping.

## 2024-08-12 ENCOUNTER — TELEPHONE (OUTPATIENT)
Dept: FAMILY MEDICINE CLINIC | Facility: CLINIC | Age: 29
End: 2024-08-12

## 2024-08-12 NOTE — TELEPHONE ENCOUNTER
ORIM for pt to call back to reschedule his October 23, 2024 appointment at 3:20 PM due to Dr. Campuzano being on vacation.

## 2024-08-15 DIAGNOSIS — K21.00 GASTROESOPHAGEAL REFLUX DISEASE WITH ESOPHAGITIS, UNSPECIFIED WHETHER HEMORRHAGE: ICD-10-CM

## 2024-08-26 DIAGNOSIS — E78.1 HYPERTRIGLYCERIDEMIA: ICD-10-CM

## 2024-08-27 RX ORDER — ROSUVASTATIN CALCIUM 40 MG/1
40 TABLET, COATED ORAL DAILY
Qty: 90 TABLET | Refills: 1 | Status: SHIPPED | OUTPATIENT
Start: 2024-08-27

## 2024-09-12 DIAGNOSIS — F41.9 ANXIETY: ICD-10-CM

## 2024-09-12 DIAGNOSIS — E78.1 HYPERTRIGLYCERIDEMIA: ICD-10-CM

## 2024-09-12 RX ORDER — ROSUVASTATIN CALCIUM 40 MG/1
40 TABLET, COATED ORAL DAILY
Qty: 90 TABLET | Refills: 1 | Status: SHIPPED | OUTPATIENT
Start: 2024-09-12

## 2024-09-12 NOTE — TELEPHONE ENCOUNTER
Reason for call:   [x] Refill   [] Prior Auth  [] Other:     Office:   [x] PCP/Provider - Joshua Campuzano MD   [] Specialty/Provider -     Medication: ALPRAZolam 0.25 mg / 1 tab daily      Quantity: 30 tabs    Pharmacy: [id number] - [place] - [address]       Does the patient have enough for 3 days?   [x] Yes   [] No - Send as HP to POD

## 2024-09-12 NOTE — TELEPHONE ENCOUNTER
Patient called the pharmacy and they said that there were no refills on medication.      Reason for call:   [x] Refill   [] Prior Auth  [] Other:     Office:   [x] PCP/Provider -Joshua Campuzano/[facility]        [] Specialty/Provider -     Medication: Crestor    Dose/Frequency: 40 mg     Quantity: #90    Pharmacy: [facility]    Does the patient have enough for 3 days?   [] Yes   [x] No - Send as HP to POD

## 2024-09-13 RX ORDER — ALPRAZOLAM 0.25 MG
0.25 TABLET ORAL
Qty: 30 TABLET | Refills: 0 | Status: SHIPPED | OUTPATIENT
Start: 2024-09-13

## 2024-10-28 ENCOUNTER — TELEPHONE (OUTPATIENT)
Age: 29
End: 2024-10-28

## 2024-10-28 NOTE — TELEPHONE ENCOUNTER
Pt is looking for insurance and was given these options:  [employer] blue cross gold simple PPO     [employer] [place] EPO Gold plus Dental and Vision    [employer] Bronze PPO    Pt said he spoke with the insurance directly and was told that these are in network however, I don't see them on the list for insurance. Please review and advise pt after 4:45pm 10/29 if possible

## 2024-10-29 NOTE — TELEPHONE ENCOUNTER
10/29 9:08am: LVM that we do NOT accept the three insurances that PT provided. If PT does want to continue to be a patient of our practice he will have to be a Self-Pay.

## 2024-10-29 NOTE — TELEPHONE ENCOUNTER
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

## 2024-11-01 DIAGNOSIS — F41.9 ANXIETY: ICD-10-CM

## 2024-11-01 NOTE — TELEPHONE ENCOUNTER
Reason for call:   [] Refill   [] Prior Auth  [] Other:     Office:   [x] PCP/Provider - : Joshua Campuzano MD   [] Specialty/Provider -     Medication:  ALPRAZolam (XANAX) 0.25 mg tablet    Dose/Frequency: Take 1 tablet (0.25 mg total) by mouth daily at bedtime as needed for anxiety,     Quantity: 30    Pharmacy: [id number] - [place] - [address] [place]      Does the patient have enough for 3 days?   [] Yes   [x] No - Send as HP to POD

## 2024-11-04 RX ORDER — ALPRAZOLAM 0.25 MG/1
0.25 TABLET ORAL
Qty: 30 TABLET | Refills: 0 | Status: SHIPPED | OUTPATIENT
Start: 2024-11-04

## 2024-12-06 DIAGNOSIS — F41.9 ANXIETY: ICD-10-CM

## 2024-12-06 DIAGNOSIS — K21.00 GASTROESOPHAGEAL REFLUX DISEASE WITH ESOPHAGITIS, UNSPECIFIED WHETHER HEMORRHAGE: ICD-10-CM

## 2024-12-06 RX ORDER — ALPRAZOLAM 0.25 MG/1
0.25 TABLET ORAL
Qty: 30 TABLET | Refills: 0 | Status: SHIPPED | OUTPATIENT
Start: 2024-12-06

## 2024-12-06 NOTE — TELEPHONE ENCOUNTER
Reason for call:   [x] Refill   [] Prior Auth  [x] Other:pharmacy states no remaining refills on aug rx     Office:   [x] PCP/Provider - Joshua Campuzano, / [facility]  [] Specialty/Provider -     Medication:     omeprazole (PriLOSEC) 20 mg delayed release capsule       Dose/Frequency: CHAN 1 CAPSULE(20 MG) BY MOUTH DAILY,     Quantity: 90    Pharmacy: [facility] [place][id number] - [place] - [address]      Does the patient have enough for 3 days?   [] Yes   [x] No - Send as HP to POD

## 2024-12-06 NOTE — TELEPHONE ENCOUNTER
Reason for call:   [x] Refill   [] Prior Auth  [] Other:     Office:   [x] PCP/Provider - : Joshua Campuzano MD   [] Specialty/Provider -     Medication:     ALPRAZolam (XANAX) 0.25 mg tablet       Dose/Frequency: Take 1 tablet (0.25 mg total) by mouth daily at bedtime as needed for anxiety     Quantity: 30    Pharmacy: [facility] #[id number] - [place] - [address] [phone]     Does the patient have enough for 3 days?   [x] Yes   [] No - Send as HP to POD

## 2025-02-06 DIAGNOSIS — F41.9 ANXIETY: ICD-10-CM

## 2025-02-06 RX ORDER — ALPRAZOLAM 0.25 MG/1
0.25 TABLET ORAL
Qty: 30 TABLET | Refills: 0 | OUTPATIENT
Start: 2025-02-06

## 2025-02-06 NOTE — TELEPHONE ENCOUNTER
Reason for call:   [x] Refill   [] Prior Auth  [] Other:     Office:   [x] PCP/Provider - Joshua Campuzano / [facility]   [] Specialty/Provider -     Medication:     ALPRAZolam (XANAX) 0.25 mg tablet       Dose/Frequency: Take 1 tablet (0.25 mg total) by mouth daily at bedtime as needed for anxiety,     Quantity: 30    Pharmacy: [facility] #[id number] - [place] - [address]      Does the patient have enough for 3 days?   [] Yes   [x] No - Send as HP to POD

## 2025-02-11 NOTE — TELEPHONE ENCOUNTER
Pt called in to check if the med can be refilled with an appeal as he do not knew the reason for denial. Kindly call him back. Thanks

## 2025-02-13 DIAGNOSIS — F41.9 ANXIETY: ICD-10-CM

## 2025-02-13 NOTE — TELEPHONE ENCOUNTER
Patient is going to be calling in to schedule and appointment. Patient was unaware that he was overdue for an appointment and was upset that he had to reach back in to find out why his medication was denied. Looking for a courtesy refill until his appointment and then after appointment looking for a full refill. Please advise.     Reason for call:   [x] Refill   [] Prior Auth  [] Other:     Office:   [x] PCP/Provider - [facility]  [] Specialty/Provider -     Medication: ALPRAZolam (XANAX) 0.25 mg tablet    Dose/Frequency: Take 1 tablet (0.25 mg total) by mouth daily at bedtime as needed for anxiety,     Quantity: Very based on appointment date please advise.    Pharmacy: [facility] #[id number] - [place] - [address]      Does the patient have enough for 3 days?   [] Yes   [x] No - Send as HP to POD

## 2025-02-14 RX ORDER — ALPRAZOLAM 0.25 MG/1
0.25 TABLET ORAL
Qty: 30 TABLET | Refills: 0 | Status: SHIPPED | OUTPATIENT
Start: 2025-02-14 | End: 2025-02-21

## 2025-02-21 ENCOUNTER — OFFICE VISIT (OUTPATIENT)
Dept: FAMILY MEDICINE CLINIC | Facility: CLINIC | Age: 30
End: 2025-02-21
Payer: COMMERCIAL

## 2025-02-21 VITALS
HEART RATE: 67 BPM | SYSTOLIC BLOOD PRESSURE: 122 MMHG | OXYGEN SATURATION: 98 % | BODY MASS INDEX: 36.54 KG/M2 | TEMPERATURE: 98.7 F | WEIGHT: 255.2 LBS | DIASTOLIC BLOOD PRESSURE: 80 MMHG | HEIGHT: 70 IN

## 2025-02-21 DIAGNOSIS — Z13.0 SCREENING FOR DEFICIENCY ANEMIA: ICD-10-CM

## 2025-02-21 DIAGNOSIS — Z13.1 SCREENING FOR DIABETES MELLITUS: ICD-10-CM

## 2025-02-21 DIAGNOSIS — J06.9 UPPER RESPIRATORY TRACT INFECTION, UNSPECIFIED TYPE: ICD-10-CM

## 2025-02-21 DIAGNOSIS — Z79.899 CHRONIC PRESCRIPTION BENZODIAZEPINE USE: ICD-10-CM

## 2025-02-21 DIAGNOSIS — E55.9 VITAMIN D DEFICIENCY: ICD-10-CM

## 2025-02-21 DIAGNOSIS — R05.9 COUGH, UNSPECIFIED TYPE: ICD-10-CM

## 2025-02-21 DIAGNOSIS — Z13.89 SCREENING FOR BLOOD OR PROTEIN IN URINE: ICD-10-CM

## 2025-02-21 DIAGNOSIS — Z13.29 SCREENING FOR HYPOTHYROIDISM: ICD-10-CM

## 2025-02-21 DIAGNOSIS — E78.2 MIXED HYPERLIPIDEMIA: ICD-10-CM

## 2025-02-21 DIAGNOSIS — H61.23 BILATERAL IMPACTED CERUMEN: ICD-10-CM

## 2025-02-21 DIAGNOSIS — R03.0 ELEVATED BLOOD PRESSURE READING: ICD-10-CM

## 2025-02-21 DIAGNOSIS — F41.9 ANXIETY: Primary | ICD-10-CM

## 2025-02-21 PROBLEM — T83.518A: Status: ACTIVE | Noted: 2025-02-21

## 2025-02-21 PROBLEM — T83.518A: Status: RESOLVED | Noted: 2025-02-21 | Resolved: 2025-02-21

## 2025-02-21 LAB
S PYO AG THROAT QL: NEGATIVE
SL AMB POCT RAPID FLU A: NORMAL
SL AMB POCT RAPID FLU B: NORMAL

## 2025-02-21 PROCEDURE — 69210 REMOVE IMPACTED EAR WAX UNI: CPT | Performed by: INTERNAL MEDICINE

## 2025-02-21 PROCEDURE — 87880 STREP A ASSAY W/OPTIC: CPT | Performed by: INTERNAL MEDICINE

## 2025-02-21 PROCEDURE — 87804 INFLUENZA ASSAY W/OPTIC: CPT | Performed by: INTERNAL MEDICINE

## 2025-02-21 PROCEDURE — 99214 OFFICE O/P EST MOD 30 MIN: CPT | Performed by: INTERNAL MEDICINE

## 2025-02-21 RX ORDER — ALPRAZOLAM 0.5 MG
0.5 TABLET ORAL 2 TIMES DAILY PRN
Start: 2025-02-21

## 2025-02-21 NOTE — ASSESSMENT & PLAN NOTE
Refer to psychologist, increase alprazolam up to 0.5 mg twice daily as needed.  Side effects discussed extensively.  Continue trazodone current dose.  Urine drug screen was collected during the visit.    Orders:    Ambulatory referral to Psych Services; Future    ALPRAZolam (XANAX) 0.5 mg tablet; Take 1 tablet (0.5 mg total) by mouth 2 (two) times a day as needed for anxiety Do not use together with alcohol, do not drive or use any machinery after taking the medication.  If any drowsiness or confusion stop medication and seek medical attention.

## 2025-02-21 NOTE — ASSESSMENT & PLAN NOTE
He with current supplements.  Recheck with the next blood work.    Orders:    Vitamin D 25 hydroxy; Future

## 2025-02-21 NOTE — ASSESSMENT & PLAN NOTE
Continue current dose of atorvastatin, recheck lipid panel with the next blood work.    Orders:    Comprehensive metabolic panel; Future    Lipid panel; Future

## 2025-02-21 NOTE — PROGRESS NOTES
Name: Bill Conway      : 1995      MRN: [id number]  Encounter Provider: Joshua Campuzano MD  Encounter Date: 2025   Encounter department: [facility]    Assessment & Plan  Cough, unspecified type    Orders:    POCT rapid flu A and B    POCT rapid ANTIGEN strepA    Chronic prescription benzodiazepine use    Orders:    [facility] Prescribed Meds and Special Instructions    Amphetamines, Methamphetamines    Butalbital    Phenobarbital    Secobarbital    Alprazolam    Clonazepam    Diazepam    Lorazepam    Gabapentin    Pregabalin    Cocaine    Heroin    Buprenorphine    Levorphanol    Meperidine    Naltrexone    Fentanyl    Methadone    Oxycodone    Tapentadol    THC    Tramadol    Codeine, Hydrocodone, Hydropmorphone, Morphine    Bath Salts    Ethyl Glucuronide/Ethyl Sulfate    Kratom    Spice    Methylphenidate    Phentermine    Validity Oxidant    Validity Creatinine    Validity pH    Validity Specific    Xylazine Definitive Test    Screening for deficiency anemia    Orders:    CBC and differential; Future    Mixed hyperlipidemia  Continue current dose of atorvastatin, recheck lipid panel with the next blood work.    Orders:    Comprehensive metabolic panel; Future    Lipid panel; Future    Vitamin D deficiency  He with current supplements.  Recheck with the next blood work.    Orders:    Vitamin D 25 hydroxy; Future    Screening for blood or protein in urine    Orders:    UA (URINE) with reflex to Scope; Future    Screening for diabetes mellitus    Orders:    Hemoglobin A1C; Future    Screening for hypothyroidism    Orders:    TSH, 3rd generation with Free T4 reflex; Future    Anxiety  Refer to psychologist, increase alprazolam up to 0.5 mg twice daily as needed.  Side effects discussed extensively.  Continue trazodone current dose.  Urine drug screen was collected during the visit.    Orders:    Ambulatory referral to Psych Services; Future    ALPRAZolam  (XANAX) 0.5 mg tablet; Take 1 tablet (0.5 mg total) by mouth 2 (two) times a day as needed for anxiety Do not use together with alcohol, do not drive or use any machinery after taking the medication.  If any drowsiness or confusion stop medication and seek medical attention.    Elevated blood pressure reading  Blood pressures are acceptable right now.         Upper respiratory tract infection, unspecified type  Negative for flu and strep.  We did not have a COVID test available in the office.  He is improving with OTC medications.  Continue with symptomatic management.                Ear cerumen removal    Date/Time: 2/21/2025 11:20 AM    Performed by: Joshua Campuzano MD  Authorized by: Joshua Campuzano MD  Universal Protocol:  Procedure performed by:  Consent: Verbal consent obtained. Written consent obtained.  Timeout called at: 2/21/2025 12:35 PM.  Patient understanding: patient states understanding of the procedure being performed    Patient location:  Clinic  Procedure details:     Location:  Both ears    Procedure type: curette      Approach:  External  Post-procedure details:     Complication:  None    Hearing quality:  Improved    Patient tolerance of procedure:  Tolerated well, no immediate complications      History of Present Illness     Patient came today for follow-up on his chronic medical problems and to discuss recent complaints of sinus congestion and cough that he had.    Cough  Associated symptoms include headaches and a sore throat. Pertinent negatives include no chest pain, chills, fever, myalgias, rhinorrhea, shortness of breath or wheezing.   Sore Throat   Associated symptoms include coughing and headaches. Pertinent negatives include no diarrhea, shortness of breath or vomiting.   Headache  Fatigue  Associated symptoms include coughing, fatigue, headaches and a sore throat. Pertinent negatives include no arthralgias, chest pain, chills, fever, myalgias, nausea or  vomiting.     Review of Systems   Constitutional:  Positive for fatigue. Negative for appetite change, chills and fever.   HENT:  Positive for sore throat. Negative for rhinorrhea and sinus pain.    Respiratory:  Positive for cough. Negative for chest tightness, shortness of breath and wheezing.    Cardiovascular:  Negative for chest pain, palpitations and leg swelling.   Gastrointestinal:  Negative for diarrhea, nausea and vomiting.   Musculoskeletal:  Negative for arthralgias and myalgias.   Neurological:  Positive for headaches.   Psychiatric/Behavioral:  Negative for agitation, confusion, dysphoric mood and sleep disturbance. The patient is nervous/anxious.      Past Medical History:   Diagnosis Date    Anxiety     Fatty liver     GERD (gastroesophageal reflux disease)     Hyperlipidemia     Hypertension      Past Surgical History:   Procedure Laterality Date    TONSILLECTOMY       Family History   Problem Relation Age of Onset    Cancer Mother     Diabetes Mother     Hypertension Mother     Anxiety disorder Mother     Heart attack Father     Hypertension Father     Anxiety disorder Father     Cancer Maternal Aunt      Social History     Tobacco Use    Smoking status: Never    Smokeless tobacco: Never   Vaping Use    Vaping status: Never Used   Substance and Sexual Activity    Alcohol use: Not Currently     Comment: holiday/special occassion    Drug use: Never    Sexual activity: Yes     Partners: Female     Current Outpatient Medications on File Prior to Visit   Medication Sig    albuterol (PROVENTIL HFA,VENTOLIN HFA) 90 mcg/act inhaler INHALE 2 PUFFS BY MOUTH EVERY 6 HOURS AS NEEDED FOR WHEEZING    benzonatate (TESSALON) 200 MG capsule Take 1 capsule (200 mg total) by mouth 3 (three) times a day as needed for cough    loperamide (IMODIUM) 2 mg capsule Take 1 capsule (2 mg total) by mouth 4 (four) times a day as needed for diarrhea    meclizine (ANTIVERT) 25 mg tablet Take 1 tablet (25 mg total) by mouth every  "8 (eight) hours as needed for dizziness    multivitamin (THERAGRAN) TABS Take 1 tablet by mouth daily    omeprazole (PriLOSEC) 20 mg delayed release capsule Take 1 capsule (20 mg total) by mouth daily    rosuvastatin (CRESTOR) 40 MG tablet Take 1 tablet (40 mg total) by mouth daily    traZODone (DESYREL) 100 mg tablet Take 2 tablets (200 mg total) by mouth daily at bedtime    [DISCONTINUED] ALPRAZolam (XANAX) 0.25 mg tablet Take 1 tablet (0.25 mg total) by mouth daily at bedtime as needed for anxiety    cholecalciferol (VITAMIN D3) 1,000 units tablet Take 1 tablet (1,000 Units total) by mouth daily Start after done with the high-dose.     Allergies   Allergen Reactions    Keflex [Cephalexin] Hives    Penicillins Hives     Immunization History   Administered Date(s) Administered    Tdap 05/28/2021     Objective   /80 (BP Location: Left arm, Patient Position: Sitting, Cuff Size: Large)   Pulse 67   Temp 98.7 °F (37.1 °C)   Ht 5' 10\" (1.778 m)   Wt 116 kg (255 lb 3.2 oz)   SpO2 98%   BMI 36.62 kg/m²     Physical Exam  Constitutional:       General: He is not in acute distress.     Appearance: He is not ill-appearing or toxic-appearing.   HENT:      Right Ear: Tympanic membrane normal. There is impacted cerumen.      Left Ear: Tympanic membrane normal. There is impacted cerumen.      Nose: Congestion present.   Cardiovascular:      Rate and Rhythm: Normal rate.      Heart sounds: No murmur heard.     No gallop.   Pulmonary:      Effort: No respiratory distress.      Breath sounds: No wheezing.         "

## 2025-02-21 NOTE — ASSESSMENT & PLAN NOTE
Negative for flu and strep.  We did not have a COVID test available in the office.  He is improving with OTC medications.  Continue with symptomatic management.

## 2025-02-23 LAB
4OH-XYLAZINE UR QL CFM: NEGATIVE NG/ML
6MAM UR QL CFM: NEGATIVE NG/ML
7AMINOCLONAZEPAM UR QL CFM: NEGATIVE NG/ML
A-OH ALPRAZ UR QL CFM: ABNORMAL NG/ML
ACCEPTABLE CREAT UR QL: NORMAL MG/DL
ACCEPTIBLE SP GR UR QL: NORMAL
AMPHET UR QL CFM: NEGATIVE NG/ML
BUPRENORPHINE UR QL CFM: NEGATIVE NG/ML
BUTALBITAL UR QL CFM: NEGATIVE NG/ML
BZE UR QL CFM: NEGATIVE NG/ML
CODEINE UR QL CFM: NEGATIVE NG/ML
EDDP UR QL CFM: NEGATIVE NG/ML
ETHYL GLUCURONIDE UR QL CFM: NEGATIVE NG/ML
ETHYL SULFATE UR QL SCN: NEGATIVE NG/ML
EUTYLONE UR QL: NEGATIVE NG/ML
FENTANYL UR QL CFM: NEGATIVE NG/ML
GLIADIN IGG SER IA-ACNC: NEGATIVE NG/ML
HYDROCODONE UR QL CFM: NEGATIVE NG/ML
HYDROMORPHONE UR QL CFM: NEGATIVE NG/ML
LORAZEPAM UR QL CFM: NEGATIVE NG/ML
ME-PHENIDATE UR QL CFM: NEGATIVE NG/ML
MEPERIDINE UR QL CFM: NEGATIVE NG/ML
METHADONE UR QL CFM: NEGATIVE NG/ML
METHAMPHET UR QL CFM: NEGATIVE NG/ML
MORPHINE UR QL CFM: NEGATIVE NG/ML
NALTREXONE UR QL CFM: NEGATIVE NG/ML
NITRITE UR QL: NORMAL UG/ML
NORBUPRENORPHINE UR QL CFM: NEGATIVE NG/ML
NORDIAZEPAM UR QL CFM: NEGATIVE NG/ML
NORFENTANYL UR QL CFM: NEGATIVE NG/ML
NORHYDROCODONE UR QL CFM: NEGATIVE NG/ML
NORMEPERIDINE UR QL CFM: NEGATIVE NG/ML
NOROXYCODONE UR QL CFM: NEGATIVE NG/ML
OXAZEPAM UR QL CFM: NEGATIVE NG/ML
OXYCODONE UR QL CFM: NEGATIVE NG/ML
OXYMORPHONE UR QL CFM: NEGATIVE NG/ML
PARA-FLUOROFENTANYL QUANTIFICATION: NORMAL NG/ML
PHENOBARB UR QL CFM: NEGATIVE NG/ML
RESULT ALL_PRESCRIBED MEDS AND SPECIAL INSTRUCTIONS: NORMAL
SECOBARBITAL UR QL CFM: NEGATIVE NG/ML
SL AMB 5F-ADB-M7 METABOLITE QUANTIFICATION: NEGATIVE NG/ML
SL AMB 7-OH-MITRAGYNINE (KRATOM ALKALOID) QUANTIFICATION: NEGATIVE NG/ML
SL AMB AB-FUBINACA-M3 METABOLITE QUANTIFICATION: NEGATIVE NG/ML
SL AMB ACETYL FENTANYL QUANTIFICATION: NORMAL NG/ML
SL AMB ACETYL NORFENTANYL QUANTIFICATION: NORMAL NG/ML
SL AMB ACRYL FENTANYL QUANTIFICATION: NORMAL NG/ML
SL AMB CARFENTANIL QUANTIFICATION: NORMAL NG/ML
SL AMB CTHC (MARIJUANA METABOLITE) QUANTIFICATION: NEGATIVE NG/ML
SL AMB DEXTRORPHAN (DEXTROMETHORPHAN METABOLITE) QUANT: ABNORMAL NG/ML
SL AMB GABAPENTIN QUANTIFICATION: NEGATIVE NG/ML
SL AMB JWH018 METABOLITE QUANTIFICATION: NEGATIVE NG/ML
SL AMB JWH073 METABOLITE QUANTIFICATION: NEGATIVE NG/ML
SL AMB MDMB-FUBINACA-M1 METABOLITE QUANTIFICATION: NEGATIVE NG/ML
SL AMB METHYLONE QUANTIFICATION: NEGATIVE NG/ML
SL AMB N-DESMETHYL-TRAMADOL QUANTIFICATION: NEGATIVE NG/ML
SL AMB PHENTERMINE QUANTIFICATION: NEGATIVE NG/ML
SL AMB PREGABALIN QUANTIFICATION: NEGATIVE NG/ML
SL AMB RCS4 METABOLITE QUANTIFICATION: NEGATIVE NG/ML
SL AMB RITALINIC ACID QUANTIFICATION: NEGATIVE NG/ML
SMOOTH MUSCLE AB TITR SER IF: NEGATIVE NG/ML
SPECIMEN DRAWN SERPL: NEGATIVE NG/ML
SPECIMEN PH ACCEPTABLE UR: NORMAL
TAPENTADOL UR QL CFM: NEGATIVE NG/ML
TEMAZEPAM UR QL CFM: NEGATIVE NG/ML
TRAMADOL UR QL CFM: NEGATIVE NG/ML
URATE/CREAT 24H UR: NEGATIVE NG/ML
XYLAZINE UR QL CFM: NEGATIVE NG/ML

## 2025-02-27 ENCOUNTER — RESULTS FOLLOW-UP (OUTPATIENT)
Dept: FAMILY MEDICINE CLINIC | Facility: CLINIC | Age: 30
End: 2025-02-27

## 2025-03-03 DIAGNOSIS — E78.1 HYPERTRIGLYCERIDEMIA: ICD-10-CM

## 2025-03-04 RX ORDER — ROSUVASTATIN CALCIUM 40 MG/1
40 TABLET, COATED ORAL DAILY
Qty: 90 TABLET | Refills: 1 | Status: SHIPPED | OUTPATIENT
Start: 2025-03-04

## 2025-03-10 ENCOUNTER — TELEPHONE (OUTPATIENT)
Age: 30
End: 2025-03-10

## 2025-03-14 ENCOUNTER — TELEPHONE (OUTPATIENT)
Dept: GASTROENTEROLOGY | Facility: CLINIC | Age: 30
End: 2025-03-14

## 2025-04-04 DIAGNOSIS — F41.9 ANXIETY: ICD-10-CM

## 2025-04-04 NOTE — TELEPHONE ENCOUNTER
Reason for call:   [x] Refill   [] Prior Auth  [] Other:     Office:   [x] PCP/Provider - Joshua Campuzano   [] Specialty/Provider -     Medication: ALPRAZolam (XANAX) 0.5 mg tablet     Dose/Frequency: Take 1 tablet (0.5 mg total) by mouth 2 (two) times a day as needed for anxiety     Quantity: 60    Pharmacy: [facility]   Does the patient have enough for 3 days?   [] Yes   [x] No - Send as HP to POD

## 2025-04-07 RX ORDER — ALPRAZOLAM 0.5 MG
0.5 TABLET ORAL 2 TIMES DAILY PRN
Qty: 60 TABLET | Refills: 0 | OUTPATIENT
Start: 2025-04-07

## 2025-04-08 RX ORDER — ALPRAZOLAM 0.5 MG
0.5 TABLET ORAL 2 TIMES DAILY PRN
Qty: 60 TABLET | Refills: 0 | Status: SHIPPED | OUTPATIENT
Start: 2025-04-08

## 2025-05-23 DIAGNOSIS — F51.01 PRIMARY INSOMNIA: ICD-10-CM

## 2025-05-23 RX ORDER — TRAZODONE HYDROCHLORIDE 100 MG/1
200 TABLET ORAL
Qty: 180 TABLET | Refills: 1 | Status: SHIPPED | OUTPATIENT
Start: 2025-05-23

## 2025-06-13 DIAGNOSIS — K21.00 GASTROESOPHAGEAL REFLUX DISEASE WITH ESOPHAGITIS, UNSPECIFIED WHETHER HEMORRHAGE: ICD-10-CM

## 2025-06-13 RX ORDER — OMEPRAZOLE 20 MG/1
20 CAPSULE, DELAYED RELEASE ORAL DAILY
Qty: 90 CAPSULE | Refills: 1 | Status: SHIPPED | OUTPATIENT
Start: 2025-06-13

## 2025-07-11 DIAGNOSIS — F41.9 ANXIETY: ICD-10-CM

## 2025-07-11 RX ORDER — ALPRAZOLAM 0.5 MG
0.5 TABLET ORAL 2 TIMES DAILY PRN
Qty: 60 TABLET | Refills: 0 | Status: SHIPPED | OUTPATIENT
Start: 2025-07-11

## 2025-07-11 NOTE — TELEPHONE ENCOUNTER
Reason for call:   [x] Refill   [] Prior Auth  [] Other:     Office:   [x] PCP/Provider - Joshua Campuzano MD / [facility]   [] Specialty/Provider -     Medication: ALPRAZolam (XANAX) 0.5 mg tablet      Dose/Frequency:  Take 1 tablet (0.5 mg total) by mouth 2 (two) times a day as needed for anxiety     Quantity: 60    Pharmacy: [facility] #[id number] - [place] - [address]   Does the patient have enough for 3 days?   [x] Yes   [] No - Send as HP to POD    Mail Away Pharmacy   Does the patient have enough for 10 days?   [] Yes   [] No - Send as HP to POD